# Patient Record
Sex: FEMALE | Race: WHITE | Employment: PART TIME | URBAN - METROPOLITAN AREA
[De-identification: names, ages, dates, MRNs, and addresses within clinical notes are randomized per-mention and may not be internally consistent; named-entity substitution may affect disease eponyms.]

---

## 2017-10-16 ENCOUNTER — OFFICE VISIT (OUTPATIENT)
Dept: INTERNAL MEDICINE CLINIC | Age: 32
End: 2017-10-16

## 2017-10-16 VITALS
DIASTOLIC BLOOD PRESSURE: 67 MMHG | HEIGHT: 67 IN | WEIGHT: 137 LBS | TEMPERATURE: 97.7 F | RESPIRATION RATE: 12 BRPM | SYSTOLIC BLOOD PRESSURE: 108 MMHG | BODY MASS INDEX: 21.5 KG/M2 | HEART RATE: 73 BPM

## 2017-10-16 DIAGNOSIS — B36.0 TINEA VERSICOLOR: ICD-10-CM

## 2017-10-16 DIAGNOSIS — Z00.00 WELL WOMAN EXAM (NO GYNECOLOGICAL EXAM): Primary | ICD-10-CM

## 2017-10-16 DIAGNOSIS — Z13.0 SCREENING, IRON DEFICIENCY ANEMIA: ICD-10-CM

## 2017-10-16 DIAGNOSIS — J30.2 ACUTE SEASONAL ALLERGIC RHINITIS DUE TO OTHER ALLERGEN: ICD-10-CM

## 2017-10-16 DIAGNOSIS — R53.83 FATIGUE, UNSPECIFIED TYPE: ICD-10-CM

## 2017-10-16 DIAGNOSIS — K64.9 HEMORRHOIDS, UNSPECIFIED HEMORRHOID TYPE: ICD-10-CM

## 2017-10-16 RX ORDER — MINERAL OIL
ENEMA (ML) RECTAL
COMMUNITY
End: 2018-02-06

## 2017-10-16 RX ORDER — HYDROCORTISONE ACETATE 25 MG/1
25 SUPPOSITORY RECTAL 2 TIMES DAILY
Qty: 14 SUPPOSITORY | Refills: 2
Start: 2017-10-16 | End: 2017-10-16 | Stop reason: SDUPTHER

## 2017-10-16 RX ORDER — KETOCONAZOLE 20 MG/G
CREAM TOPICAL 2 TIMES DAILY
Qty: 30 G | Refills: 1 | Status: SHIPPED | OUTPATIENT
Start: 2017-10-16 | End: 2017-11-06

## 2017-10-16 NOTE — PROGRESS NOTES
Presents for a PE. She will make an appt with Dr Yamileth Hanna for a pap. She has white spots on her body.

## 2017-10-16 NOTE — MR AVS SNAPSHOT
Visit Information Date & Time Provider Department Dept. Phone Encounter #  
 10/16/2017  2:45 PM Darlene Guo MD [facility] [address] [id number] Upcoming Health Maintenance Date Due  
 PAP AKA CERVICAL CYTOLOGY 3/1/2017 DTaP/Tdap/Td series (3 - Td) 4/16/2025 Allergies as of 10/16/2017  Review Complete On: 10/16/2017 By: Darlene Guo MD  
  
 Severity Noted Reaction Type Reactions Ceftin [Cefuroxime Axetil]  11/30/2010    Hives Gardasil [H Papillomavirus Vac,qval (Pf)]  09/07/2012    Other (comments)  
 syncope Current Immunizations  Reviewed on 10/16/2017 Name Date Hep A Vaccine (Adult) 1/30/2014 Human Papillomavirus 1/1/2006 Influenza Vaccine 8/19/2017, 9/16/2014, 8/24/2013 TDAP Vaccine 9/7/2012 Tdap 4/16/2015 Reviewed by Darlene Guo MD on 10/16/2017 at  3:29 PM  
 Reviewed by Darlene Guo MD on 10/16/2017 at  3:29 PM  
You Were Diagnosed With   
  
 Codes Comments Well woman exam (no gynecological exam)    -  Primary ICD-10-CM: Z00.00 ICD-9-CM: V70.0 Tinea versicolor     ICD-10-CM: B36.0 ICD-9-CM: 111.0 Acute seasonal allergic rhinitis due to other allergen     ICD-10-CM: J30.2 ICD-9-CM: 477.8 Fatigue, unspecified type     ICD-10-CM: R53.83 ICD-9-CM: 780.79 Screening, iron deficiency anemia     ICD-10-CM: Z13.0 ICD-9-CM: V78.0 Hemorrhoids, unspecified hemorrhoid type     ICD-10-CM: K64.9 ICD-9-CM: 337. 6 Vitals BP Pulse Temp Resp Height(growth percentile) Weight(growth percentile) 108/67 (BP 1 Location: Left arm, BP Patient Position: Sitting) 73 97.7 °F (36.5 °C) 12 5' 7\" (1.702 m) 137 lb (62.1 kg) Breastfeeding? BMI OB Status Smoking Status Yes 21.46 kg/m2 Having regular periods Never Smoker Vitals History BMI and BSA Data Body Mass Index Body Surface Area  
 21.46 kg/m 2 1.71 m 2 Preferred Pharmacy Pharmacy Name Phone [facility] [address], [facility] [address]  [facility] [phone] Your Updated Medication List  
  
   
This list is accurate as of: 10/16/17  3:47 PM.  Always use your most recent med list.  
  
  
  
  
 fexofenadine 180 mg tablet Commonly known as:  Mervat Gomezrenny Take  by mouth. hydrocortisone-pramoxine rectal foam  
Commonly known as:  PROCTOFOAM HC Insert 1 Applicator into rectum two (2) times a day.  
  
 ketoconazole 2 % topical cream  
Commonly known as:  NIZORAL Apply  to affected area two (2) times a day for 21 days. TAZORAC 0.05 % topical cream  
Generic drug:  tazorotene Apply  to affected area nightly. use thin film Prescriptions Sent to Pharmacy Refills  
 ketoconazole (NIZORAL) 2 % topical cream 1 Sig: Apply  to affected area two (2) times a day for 21 days. Class: Normal  
 Pharmacy: [facility] [address], [place] - [address] AT [facility] of [address]\"" Ph #: [phone] Route: Topical  
 hydrocortisone-pramoxine (PROCTOFOAM HC) rectal foam 0 Sig: Insert 1 Applicator into rectum two (2) times a day. Class: Normal  
 Pharmacy: [facility] [address], [place] - [address] AT [facility] of [address] (\[facility]\"" Ph #: [phone] Route: Rectal  
  
We Performed the Following CBC W/O DIFF [65391 CPT(R)] FERRITIN [69997 CPT(R)] IRON PROFILE [id number] CPT(R)] LIPID PANEL [58084 CPT(R)] METABOLIC PANEL, COMPREHENSIVE [48902 CPT(R)] TSH 3RD GENERATION [05458 CPT(R)] VITAMIN D, 25 HYDROXY [id number] CPT(R)] Introducing [facility] & HEALTH SERVICES! Dear Kirit Ewing: Thank you for requesting a [employer] account. Our records indicate that you already have an active [employer] account. You can access your account anytime at [email or web address] Did you know that you can access your hospital and ER discharge instructions at any time in ForceManager? You can also review all of your test results from your hospital stay or ER visit. Additional Information If you have questions, please visit the Frequently Asked Questions section of the ForceManager website at [email or web address] Remember, ForceManager is NOT to be used for urgent needs. For medical emergencies, dial 911. Now available from your iPhone and Android! Please provide this summary of care documentation to your next provider. Your primary care clinician is listed as Darlene Guo. If you have any questions after today's visit, please call [phone].

## 2017-10-17 NOTE — PROGRESS NOTES
Tony Emanuel is a 33 y.o. female  Presenting for her annual checkup and follow-up    She is doing well. Working at [employer] on the side. Has 3 yo child    C/o mild hemorrhoid pains at times. Tried otc hydrocortisone prn  Has hypopigmented spots of posterior right leg, mildly of antecubital fossae. Tried clotrimazole w/o relief    Last breast exam: none  Last PAP/pelvic: 2013? Last colonoscopy: none  Last DEXA:   Health Maintenance   Topic Date Due    PAP AKA CERVICAL CYTOLOGY  03/01/2017    DTaP/Tdap/Td series (3 - Td) 04/16/2025    INFLUENZA AGE 9 TO ADULT  Completed       Exercise: moderately active  Diet: generally follows a low fat low cholesterol diet    Vaccinations reviewed  Immunization History   Administered Date(s) Administered    Hep A Vaccine (Adult) 01/30/2014    Human Papillomavirus 01/01/2006    Influenza Vaccine 08/24/2013, 09/16/2014, 08/19/2017    TDAP Vaccine 09/07/2012    Tdap 04/16/2015       Allergies: Ceftin [cefuroxime axetil] and Gardasil [h papillomavirus vac,qval (pf)]  Current Outpatient Prescriptions   Medication Sig    tazorotene (TAZORAC) 0.05 % topical cream Apply  to affected area nightly. use thin film    fexofenadine (ALLEGRA) 180 mg tablet Take  by mouth.  ketoconazole (NIZORAL) 2 % topical cream Apply  to affected area two (2) times a day for 21 days.  hydrocortisone-pramoxine (PROCTOFOAM HC) rectal foam Insert 1 Applicator into rectum two (2) times a day. No current facility-administered medications for this visit. has a past medical history of Acne; Contusion of left knee (11/6/2013); Eczema (11/6/2013); GERD (gastroesophageal reflux disease) (1/27/2015); Migraine (11/6/2013); Right knee meniscal tear; Right wrist fracture (1997, 2000); Scoliosis; and Screening for cervical cancer (2/2010, 3/8/2012). She also has no past medical history of Abuse; Anemia NEC; Arrhythmia; Arthritis; Asthma;  Autoimmune disease ([facility] CAD (coronary artery disease); Calculus of kidney; Chlamydia; Chronic kidney disease; Chronic pain; Complication of anesthesia; Congestive heart failure, unspecified; COPD; Depression; Diabetes ([facility] Genital herpes, unspecified; Gonorrhea; Herpes gestationis; Herpes simplex without mention of complication; Human immunodeficiency virus (HIV) disease; Hypercholesterolemia; Hypertension; Infertility, female; Liver disease; Phlebitis and thrombophlebitis of unspecified site; Pituitary disorder ([facility] Polycystic disease, ovaries; Postpartum depression; Psychotic disorder; PUD (peptic ulcer disease); Rhesus isoimmunization unspecified as to episode of care in pregnancy; Seizures ([facility] Sickle-cell disease, unspecified; Stroke ([facility] Syphilis; Systemic lupus erythematosus ([facility] Thromboembolus ([facility] Thyroid disease; Trauma; Unspecified breast disorder; or Unspecified epilepsy without mention of intractable epilepsy. History reviewed. No pertinent surgical history.    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 [employer]     Social History Main Topics    Smoking status: Never Smoker    Smokeless tobacco: Never Used    Alcohol use 0.0 oz/week     1 - 2 Glasses of wine per week    Drug use: No    Sexual activity: Not Currently     Partners: Male     Birth control/ protection: None     Other Topics Concern    Not on file     Social History Narrative     Family History   Problem Relation Age of Onset    Hypertension Mother     Hypertension Father     High Cholesterol Father     Asthma Sister     Cancer Maternal Grandmother      colon    Diabetes Paternal Grandfather      type 2    Heart Attack Paternal Grandfather      fatal       Review of Systems - History obtained from the patient  General ROS: negative for - night sweats, weight gain or weight loss  Cardiovascular ROS: no chest pain, dyspnea on exertion, edema  GYN ROS: normal menses, no abnormal bleeding, pelvic pain or discharge, no breast pain or new or enlarging lumps on self exam.    Physical exam  Blood pressure 108/67, pulse 73, temperature 97.7 °F (36.5 °C), resp. rate 12, height 5' 7\" (1.702 m), weight 137 lb (62.1 kg), currently breastfeeding. Wt Readings from Last 3 Encounters:   10/16/17 137 lb (62.1 kg)   11/30/16 140 lb 9.6 oz (63.8 kg)   08/11/15 165 lb 6.4 oz (75 kg)     she appears well, alert and oriented x 3, pleasant and cooperative. Vitals as noted. No rashes or significant lesions. Neck supple and free of adenopathy, or masses. No thyromegaly or carotid bruits. Cranial nerves normal. Lungs are clear to auscultation. Heart sounds are normal with no murmurs, clicks, gallops or rubs. Abdomen is soft, non- tender, with no masses or organomegaly. Extremities, peripheral pulses and reflexes are normal.  .   Skin hypopigmented patches (2-3 cm on legs, arms  PELVIC EXAM: examination not indicated      Diagnoses and all orders for this visit:    1. Well woman exam (no gynecological exam)  -     LIPID PANEL  -     METABOLIC PANEL, COMPREHENSIVE  -     CBC W/O DIFF    2. Tinea versicolor  - mild. Try cream.  Contact me if not improving and can consider oral therapy  -     ketoconazole (NIZORAL) 2 % topical cream; Apply  to affected area two (2) times a day for 21 days. 3. Acute seasonal allergic rhinitis due to other allergen - trial of nasal corticosteroid OTC    4. Fatigue, unspecified type - mild. She is working, mother of 3 yo. Check labs  -     TSH 3RD GENERATION  -     IRON PROFILE  -     FERRITIN  -     VITAMIN D, 25 HYDROXY    5. Screening, iron deficiency anemia    6. Hemorrhoids, unspecified hemorrhoid type  -     Try hydrocortisone-pramoxine (PROCTOFOAM HC) rectal foam; Insert 1 Applicator into rectum two (2) times a day.         The patient is asked to make an attempt to improve diet and exercise patterns    Return for yearly wellness visits

## 2018-02-05 ENCOUNTER — TELEPHONE (OUTPATIENT)
Dept: OBGYN CLINIC | Age: 33
End: 2018-02-05

## 2018-02-05 NOTE — TELEPHONE ENCOUNTER
Patient advise of MD recommendations and was placed on the schedule to be seen on 2/13/18 at 8:40am.      Earlier appointment offer which did not suit patient. Patient advised to try unisom and vit b 6  3-4 times a day, sips of fluids, small frequent meals and to try brady. Patient verbalized understanding.

## 2018-02-05 NOTE — TELEPHONE ENCOUNTER
Patient is coming in for first OB visit on 2/21/18 but patient is struggling with nausea from pregnancy. Patient has a history of hyperemesis with her former pregnancy. Please advise. Please forward to [facility] out of office this PM)    [facility].

## 2018-02-06 ENCOUNTER — OFFICE VISIT (OUTPATIENT)
Dept: OBGYN CLINIC | Age: 33
End: 2018-02-06

## 2018-02-06 VITALS — WEIGHT: 134.8 LBS | BODY MASS INDEX: 21.11 KG/M2 | DIASTOLIC BLOOD PRESSURE: 78 MMHG | SYSTOLIC BLOOD PRESSURE: 118 MMHG

## 2018-02-06 DIAGNOSIS — R11.2 NAUSEA AND VOMITING, INTRACTABILITY OF VOMITING NOT SPECIFIED, UNSPECIFIED VOMITING TYPE: Primary | ICD-10-CM

## 2018-02-06 RX ORDER — DOXYLAMINE SUCCINATE AND PYRIDOXINE HYDROCHLORIDE, DELAYED RELEASE TABLETS 10 MG/10 MG 10; 10 MG/1; MG/1
1 TABLET, DELAYED RELEASE ORAL
Qty: 100 TAB | Refills: 0 | Status: SHIPPED | OUTPATIENT
Start: 2018-02-06 | End: 2018-02-21 | Stop reason: SDUPTHER

## 2018-02-06 RX ORDER — DOXYLAMINE SUCCINATE AND PYRIDOXINE HYDROCHLORIDE, DELAYED RELEASE TABLETS 10 MG/10 MG 10; 10 MG/1; MG/1
2 TABLET, DELAYED RELEASE ORAL
Qty: 120 TAB | Refills: 0 | Status: SHIPPED | OUTPATIENT
Start: 2018-02-06 | End: 2018-03-08

## 2018-02-06 RX ORDER — ONDANSETRON 4 MG/1
4 TABLET, ORALLY DISINTEGRATING ORAL
Qty: 30 TAB | Refills: 1 | Status: SHIPPED | OUTPATIENT
Start: 2018-02-06 | End: 2018-03-29

## 2018-02-06 NOTE — MR AVS SNAPSHOT
[address] [facility] [address] [place] [place] [address] 
[phone] Patient: Luis Pat MRN: [id number] DAVIE:[birth year] Visit Information Date & Time Provider Department Dept. Phone Encounter #  
 2/6/2018  1:40 PM MD Brodie Tejada [phone] [id number] Your Appointments 2/13/2018  8:40 AM  
ESTABLISHED PATIENT with MD Brodie Tejada ([address]) Appt Note: problem visit NV  lmp 12/24/17  
 [address] [facility] [address] [place] [address]  
[phone]  
  
   
 [address] [address] [place] [address]  
  
    
 2/21/2018  1:15 PM  
ULTRASOUND with Sagrario Pineda Brodie Lutz ([address]) Appt Note: NOB-lmp 12/24/17 +upt u/s + TP  
 [address] [address]  
[phone]  
  
   
 [address] [address]  
  
    
 2/21/2018  1:50 PM  
ESTABLISHED PATIENT with MD Brodie Tejada ([address]) Appt Note: NOB-lmp 12/24/17 +upt u/s + TP; NOB-lmp 12/24/17 +upt u/s + TP  
 [address] [address]  
[phone] Upcoming Health Maintenance Date Due  
 PAP AKA CERVICAL CYTOLOGY 3/1/2017 Allergies as of 2/6/2018  Review Complete On: 10/16/2017 By: Tiffanie Morrissey MD  
  
 Severity Noted Reaction Type Reactions Ceftin [Cefuroxime Axetil]  11/30/2010    Hives Gardasil [H Papillomavirus Vac,qval (Pf)]  09/07/2012    Other (comments)  
 syncope Current Immunizations  Reviewed on 10/16/2017 Name Date Hep A Vaccine (Adult) 1/30/2014 Human Papillomavirus 1/1/2006 Influenza Vaccine 8/19/2017, 9/16/2014, 8/24/2013 TDAP Vaccine 9/7/2012 Tdap 4/16/2015 Not reviewed this visit Vitals BP Weight(growth percentile) LMP Breastfeeding? BMI OB Status 118/78 134 lb 12.8 oz (61.1 kg) 12/24/2017 (Exact Date) No 21.11 kg/m2 Pregnant Smoking Status Never Smoker BMI and BSA Data Body Mass Index Body Surface Area  
 21.11 kg/m 2 1.7 m 2 Preferred Pharmacy Pharmacy Name Phone [facility] [address]  [facility] [phone] Your Updated Medication List  
  
   
This list is accurate as of: 2/6/18  1:57 PM.  Always use your most recent med list.  
  
  
  
  
 fexofenadine 180 mg tablet Commonly known as:  Kristel Riding Take  by mouth. hydrocortisone-pramoxine rectal foam  
Commonly known as:  PROCTOFOAM HC Insert 1 Applicator into rectum two (2) times a day. TAZORAC 0.05 % topical cream  
Generic drug:  tazorotene Apply  to affected area nightly. use thin film Patient Instructions Nausea and Vomiting: Care Instructions Your Care Instructions When you are nauseated, you may feel weak and sweaty and notice a lot of saliva in your mouth. Nausea often leads to vomiting. Most of the time you do not need to worry about nausea and vomiting, but they can be signs of other illnesses. Two common causes of nausea and vomiting are stomach flu and food poisoning. Nausea and vomiting from viral stomach flu will usually start to improve within 24 hours. Nausea and vomiting from food poisoning may last from 12 to 48 hours. The doctor has checked you carefully, but problems can develop later. If you notice any problems or new symptoms, get medical treatment right away. Follow-up care is a key part of your treatment and safety. Be sure to make and go to all appointments, and call your doctor if you are having problems. It's also a good idea to know your test results and keep a list of the medicines you take. How can you care for yourself at home?  
· To prevent dehydration, drink plenty of fluids, enough so that your urine is light yellow or clear like water. Choose water and other caffeine-free clear liquids until you feel better. If you have kidney, heart, or liver disease and have to limit fluids, talk with your doctor before you increase the amount of fluids you drink. · Rest in bed until you feel better. · When you are able to eat, try clear soups, mild foods, and liquids until all symptoms are gone for 12 to 48 hours. Other good choices include dry toast, crackers, cooked cereal, and gelatin dessert, such as Jell-O. When should you call for help? Call 911 anytime you think you may need emergency care. For example, call if: 
? · You passed out (lost consciousness). ?Call your doctor now or seek immediate medical care if: 
? · You have symptoms of dehydration, such as: ¨ Dry eyes and a dry mouth. ¨ Passing only a little dark urine. ¨ Feeling thirstier than usual.  
? · You have new or worsening belly pain. ? · You have a new or higher fever. ? · You vomit blood or what looks like coffee grounds. ? Watch closely for changes in your health, and be sure to contact your doctor if: 
? · You have ongoing nausea and vomiting. ? · Your vomiting is getting worse. ? · Your vomiting lasts longer than 2 days. ? · You are not getting better as expected. Where can you learn more? Go to http://remi-kannan.info/. Enter 25 [id number] in the search box to learn more about \"Nausea and Vomiting: Care Instructions. \" Current as of: March 20, 2017 Content Version: 11.4 © 5582-4274 [employer]. Care instructions adapted under license by [employer] (which disclaims liability or warranty for this information). If you have questions about a medical condition or this instruction, always ask your healthcare professional. [place] [address] any warranty or liability for your use of this information. Introducing [facility] & HEALTH SERVICES! Dear Yazmin Serrano: Thank you for requesting a [employer] account. Our records indicate that you already have an active [employer] account. You can access your account anytime at [email or web address] Did you know that you can access your hospital and ER discharge instructions at any time in Socii? You can also review all of your test results from your hospital stay or ER visit. Additional Information If you have questions, please visit the Frequently Asked Questions section of the Soci[employer] website at [email or web address] Remember, Socii is NOT to be used for urgent needs. For medical emergencies, dial 911. Now available from your iPhone and Android! Please provide this summary of care documentation to your next provider. Your primary care clinician is listed as Alessio Luevano. If you have any questions after today's visit, please call [phone].

## 2018-02-06 NOTE — PATIENT INSTRUCTIONS
Nausea and Vomiting: Care Instructions  Your Care Instructions    When you are nauseated, you may feel weak and sweaty and notice a lot of saliva in your mouth. Nausea often leads to vomiting. Most of the time you do not need to worry about nausea and vomiting, but they can be signs of other illnesses. Two common causes of nausea and vomiting are stomach flu and food poisoning. Nausea and vomiting from viral stomach flu will usually start to improve within 24 hours. Nausea and vomiting from food poisoning may last from 12 to 48 hours. The doctor has checked you carefully, but problems can develop later. If you notice any problems or new symptoms, get medical treatment right away. Follow-up care is a key part of your treatment and safety. Be sure to make and go to all appointments, and call your doctor if you are having problems. It's also a good idea to know your test results and keep a list of the medicines you take. How can you care for yourself at home? · To prevent dehydration, drink plenty of fluids, enough so that your urine is light yellow or clear like water. Choose water and other caffeine-free clear liquids until you feel better. If you have kidney, heart, or liver disease and have to limit fluids, talk with your doctor before you increase the amount of fluids you drink. · Rest in bed until you feel better. · When you are able to eat, try clear soups, mild foods, and liquids until all symptoms are gone for 12 to 48 hours. Other good choices include dry toast, crackers, cooked cereal, and gelatin dessert, such as Jell-O. When should you call for help? Call 911 anytime you think you may need emergency care. For example, call if:  ? · You passed out (lost consciousness). ?Call your doctor now or seek immediate medical care if:  ? · You have symptoms of dehydration, such as:  ¨ Dry eyes and a dry mouth. ¨ Passing only a little dark urine.   ¨ Feeling thirstier than usual.   ? · You have new or worsening belly pain. ? · You have a new or higher fever. ? · You vomit blood or what looks like coffee grounds. ? Watch closely for changes in your health, and be sure to contact your doctor if:  ? · You have ongoing nausea and vomiting. ? · Your vomiting is getting worse. ? · Your vomiting lasts longer than 2 days. ? · You are not getting better as expected. Where can you learn more? Go to http://remi-kannan.info/. Enter 25 532451 in the search box to learn more about \"Nausea and Vomiting: Care Instructions. \"  Current as of: March 20, 2017  Content Version: 11.4  © 4290-4901 [employer]. Care instructions adapted under license by [employer] (which disclaims liability or warranty for this information). If you have questions about a medical condition or this instruction, always ask your healthcare professional. [address] any warranty or liability for your use of this information.

## 2018-02-06 NOTE — PROGRESS NOTES
[facility]  [email or web address]  [phone]    Salo Lozada MD, FACOG       OB/GYN Problem visit    Chief Complaint:   Chief Complaint   Patient presents with    Irregular Menses       History of Present Illness: This is a new problem being evaluated by this provider. The patient is a 33 y.o.  female who reports having recent positive pregnancy test. Scheduled for EOB 18. C/o nausea and vomiting. She reports the symptoms are is moderately worse. Aggravating factors include foods, fluids. Alleviating factors include none. Tried b6, doxylamine. She does not have other concerns. LMP: Patient's last menstrual period was 2017 (exact date). [address] [place] [facility] [address]:  Past Medical History:   Diagnosis Date    Acne     Contusion of left knee 2013    Eczema 2013    GERD (gastroesophageal reflux disease) 2015    Migraine 2013    Right knee meniscal tear     resolved w physical therapy. BBall injury    Right wrist fracture , 2000    x2    Scoliosis     mild    Screening for cervical cancer 2010, 3/8/2012    normal. Dr. Vidal Aguayo     No past surgical history on file. Family History   Problem Relation Age of Onset    Hypertension Mother     Hypertension Father     High Cholesterol Father     Asthma Sister     Cancer Maternal Grandmother      colon    Diabetes Paternal Grandfather      type 2    Heart Attack Paternal Grandfather      fatal     Social History   Substance Use Topics    Smoking status: Never Smoker    Smokeless tobacco: Never Used    Alcohol use 0.0 oz/week     1 - 2 Glasses of wine per week     Allergies   Allergen Reactions    Ceftin [Cefuroxime Axetil] Hives    Gardasil [H Papillomavirus Vac,Qval (Pf)] Other (comments)     syncope     Current Outpatient Prescriptions   Medication Sig    ondansetron (ZOFRAN ODT) 4 mg disintegrating tablet Take 1 Tab by mouth every eight (8) hours as needed for Nausea.     doxylamine-pyridoxine, vit B6, (DICLEGIS) 10-10 mg TbEC DR tablet Take 1 Tab by mouth nightly. 2 tabs po qhs, add one in am after 3d prn, add one in pm after 6d prn. Max 4/day     No current facility-administered medications for this visit. Review of Systems:  History obtained from the patient  Constitutional: negative for fevers, chills and weight loss  ENT ROS: negative for - hearing change, oral lesions or visual changes  Respiratory: negative for cough, wheezing or dyspnea on exertion  Cardiovascular: negative for chest pain, irregular heart beats, exertional chest pressure/discomfort  Gastrointestinal: negative for dysphagia, nausea and vomiting  Genito-Urinary ROS:  see HPI  Inteument/breast: negative for rash, breast lump and nipple discharge  Musculoskeletal:negative for stiff joints, neck pain and muscle weakness  Endocrine ROS: negative for - breast changes, galactorrhea or temperature intolerance  Hematological and Lymphatic ROS: negative for - blood clots, bruising or swollen lymph nodes    Physical Exam:  Visit Vitals    /78    Wt 134 lb 12.8 oz (61.1 kg)    Breastfeeding No    BMI 21.11 kg/m2       GENERAL: alert, well appearing, and in no distress  HEAD: normocephalic, atraumatic. NEURO: alert, oriented, normal speech    Assessment:  Encounter Diagnoses   Name Primary?  Nausea and vomiting, intractability of vomiting not specified, unspecified vomiting type Yes       Plan:  The patient is advised that she should contact the office if she does not note improvement or if symptoms recur  Recommend follow up with PCP for non-gynecologic complaints and chronic medical problems. She should contact our office with any questions or concerns  She could keep her routine annual exam appointment. N/v h/o  Disc options for n/v  Start with diclegis, add zofran prn  Bowel regimen  If NI in 7d.  Consider adding phenergan, pt will contact office  Small freq meals, po hydration  Dehydration precautions  I spent 12 minutes of face to face time counseling and discussing n/v, txt options, med rba  with the patient. More than 50 % of her visit was spent performing counseling. Orders Placed This Encounter    ondansetron (ZOFRAN ODT) 4 mg disintegrating tablet    doxylamine-pyridoxine, vit B6, (DICLEGIS) 10-10 mg TbEC DR tablet       No results found for this visit on 02/06/18.

## 2018-02-12 ENCOUNTER — PATIENT MESSAGE (OUTPATIENT)
Dept: OBGYN CLINIC | Age: 33
End: 2018-02-12

## 2018-02-12 RX ORDER — AZELAIC ACID 0.15 G/G
GEL TOPICAL 2 TIMES DAILY
Qty: 50 G | Refills: 5 | Status: SHIPPED | OUTPATIENT
Start: 2018-02-12 | End: 2018-03-29

## 2018-02-12 RX ORDER — ONDANSETRON 8 MG/1
8 TABLET, ORALLY DISINTEGRATING ORAL
Qty: 30 TAB | Refills: 1 | Status: SHIPPED | OUTPATIENT
Start: 2018-02-12 | End: 2018-03-29 | Stop reason: SDUPTHER

## 2018-02-12 NOTE — TELEPHONE ENCOUNTER
From: Pretty Koo  To: Silvino Roper MD  Sent: 2/12/2018 7:43 AM EST  Subject: Prescription Question    My insurance limited me to 15 of the ondansetron 4 mg ODTs per 30 days. I am trying my best to space this out however I have been extremely nauseated. Are you able to send in a prescription for 8mg ODTs? Because of the change in dose my insurance will cover it. Thank you!

## 2018-02-21 ENCOUNTER — OFFICE VISIT (OUTPATIENT)
Dept: OBGYN CLINIC | Age: 33
End: 2018-02-21

## 2018-02-21 VITALS — SYSTOLIC BLOOD PRESSURE: 104 MMHG | BODY MASS INDEX: 21.77 KG/M2 | DIASTOLIC BLOOD PRESSURE: 62 MMHG | WEIGHT: 139 LBS

## 2018-02-21 DIAGNOSIS — Z34.80 PRENATAL CARE OF MULTIGRAVIDA, ANTEPARTUM: Primary | ICD-10-CM

## 2018-02-21 DIAGNOSIS — R11.2 NAUSEA AND VOMITING, INTRACTABILITY OF VOMITING NOT SPECIFIED, UNSPECIFIED VOMITING TYPE: ICD-10-CM

## 2018-02-21 LAB
ANTIBODY SCREEN, EXTERNAL: NORMAL
CHLAMYDIA, EXTERNAL: NORMAL
HBSAG, EXTERNAL: NORMAL
HCT, EXTERNAL: 39.6
HGB EVAL, EXTERNAL: NORMAL
HGB, EXTERNAL: 13.2
HIV, EXTERNAL: NORMAL
N. GONORRHEA, EXTERNAL: NORMAL
PAP SMEAR, EXTERNAL: NORMAL
PLATELET CNT,   EXTERNAL: 176
RUBELLA, EXTERNAL: NORMAL
T. PALLIDUM, EXTERNAL: NORMAL
TYPE, ABO & RH, EXTERNAL: NORMAL
URINALYSIS, EXTERNAL: NORMAL

## 2018-02-21 NOTE — PROGRESS NOTES
[facility]  [email or web address]  [phone]    Clara Felder MD, [address] [facility]     Chief complaint:  Irregular cycles  Last cycle; Patient's last menstrual period was 2017 (exact date). This is a new concern and an evaluation is planned. Current pregnancy history:  Rangel Guerra is a , 33 y.o. female [facility]   She presents for the evaluation of irregular menses and a positive pregnancy test.    LMP history:  Patient's last menstrual period was 2017 (exact date). .  The date of the beginning of her last menstrual period is certain. Her menses are regular. Her cycles occur about every 4 weeks. A urine pregnancy test was positive about 4 weeks ago. She was not using contraception at the estimated time of conception. Based on her LMP, her EGA is 8 weeks,3 days with and EDC of 18. Ultrasound data:  She had an ultrasound today which revealed a viable meek pregnancy with a gestational age of 11 weeks and 2 days giving an [address] of 10/1/18. Pregnancy symptoms:  She reports nausea and vomiting (improved). She denies urinary frequency, breast tenderness, pelvic pain, vaginal bleeding. Since she found out she is pregnant, she has gained, 5 lbs. She reports her prepregnancy weight as 134 pounds. Relevant past pregnancy history:  She has the following pregnancy history: postpartum hemorrhage   Previous CF testing negative. She does not have a history of  delivery. She does not have a history of a prior  section. Relevant past medical history:(relevant to this pregnancy):   none     Pap smear history:  Last pap smear: 2010  Results: within normal limits    Occupational history  Her occupation is: full time job, pharmacist.    Substance history:   She does not report current tobacco use. She does not report current alcohol use. She does not report current drug use. Exposure history:    There are not indoor cat(s) in the home. The patient was instructed not to change cat litter boxes during pregnancy. She does report close contact with children on a regular basis. She has chicken pox or the vaccine in the past.   Patient does not report issues with domestic violence. Genetic Screening/Teratology Counseling:   (Includes patient, baby's father, or anyone in either family with:)  3.  Patient's age >/= 28 at EDC?--32      FOB age: 29years old. 2.  Thalassemia ([facility], [facility], [address], or  background): MCV<80?--yes and [facility] FOB  3.  Neural tube defect (meningomyelocele, spina bifida, anencephaly)? --no  4. Congenital heart defect?--no  5. Down syndrome?--no  6. Bradley-Sachs ([address] [place] [address])? --no  7. Canavan's Disease?--no  8. Familial Dysautonomia?--no   9. Sickle cell disease or trait ()? --no   Has she been tested for sickle trait: No  10. Hemophilia or other blood disorders?--no  11. Muscular dystrophy?--no  12. Cystic fibrosis? --no  13. [place]'s Chorea?--no  14. Mental retardation/autism (if yes was person tested for Fragile X)?-no  15. Other inherited genetic or chromosomal disorder?- no  16. Maternal metabolic disorder (DM, PKU, etc)? --no  17. Patient or FOB with a child with a birth defect not listed above?--no  17a. Patient or FOB with a birth defect themselves?--no  25. Recurrent pregnancy loss, or stillbirth?--no  19. Any medications since LMP other than prenatal vitamins (include vitamins, supplements, OTC meds, drugs, alcohol)? -- PNV, diclegis, zofran  20. Any other genetic/environmental exposure to discuss?--no. Infection History:  1. Lives with someone with TB or TB exposed?--no  2. Patient or partner has history of genital herpes?--no  3. Rash or viral illness since LMP?--no  4. History of STD (GC, CT, HPV, syphilis, HIV)? --no  5. Have you received a flu vaccine for the most recent flu season? -- yes  6.   Have you or your sexual partner(s) travelled to a [facility] area in the last 6 months? -- no    Past Medical History:   Diagnosis Date    Acne     Contusion of left knee 2013    Eczema 2013    GERD (gastroesophageal reflux disease) 2015    Migraine 2013    Right knee meniscal tear     resolved w physical therapy. BBall injury    Right wrist fracture , 2000    x2    Scoliosis     mild    Screening for cervical cancer 2010, 3/8/2012    normal. Dr. Les Chavez     No past surgical history on file. 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[facility] [employer]     Social History Main Topics    Smoking status: Never Smoker    Smokeless tobacco: Never Used    Alcohol use 0.0 oz/week     1 - 2 Glasses of wine per week    Drug use: No    Sexual activity: Not Currently     Partners: Male     Birth control/ protection: None     Family History   Problem Relation Age of Onset    Hypertension Mother     Hypertension Father     High Cholesterol Father     Asthma Sister     Cancer Maternal Grandmother      colon    Diabetes Paternal Grandfather      type 2    Heart Attack Paternal Grandfather      fatal     OB History    Para Term  AB Living   2 1 1 0 0 1   SAB TAB Ectopic Molar Multiple Live Births   0 0 0  1 1      # Outcome Date GA Lbr Faraz/2nd Weight Sex Delivery Anes PTL Lv   2 Current            1A Term  39w6d   F   N    1B Term 06/24/15 40w5d  7 lb 10.4 oz (3.47 kg) F VAGINAL DELI CSE N [place]        Allergies   Allergen Reactions    Ceftin [Cefuroxime Axetil] Hives    Gardasil [H Papillomavirus Vac,Qval (Pf)] Other (comments)     syncope     Prior to Admission medications    Medication Sig Start Date End Date Taking? Authorizing Provider   ondansetron (ZOFRAN ODT) 8 mg disintegrating tablet Take 1 Tab by mouth every eight (8) hours as needed for Nausea.  18  Yes Silvino Roper MD   azelaic acid (FINACEA) 15 % topical gel Apply  to affected area two (2) times a day. 2/12/18  Yes Mendy Donohue MD   doxylamine-pyridoxine, vit B6, (DICLEGIS) 10-10 mg TbEC DR tablet Take 2 Tabs by mouth nightly for 30 days. May add one in AM and one in the afternoon if needed. Max of 4 per day. 2/6/18 3/8/18 Yes Keerthi Meza MD   ondansetron (ZOFRAN ODT) 4 mg disintegrating tablet Take 1 Tab by mouth every eight (8) hours as needed for Nausea.  2/6/18   Keerthi Meza MD        Review of Systems - History obtained from the patient  Constitutional: negative for weight loss, fever, night sweats  HEENT: negative for hearing loss, earache, congestion, snoring, sorethroat  CV: negative for chest pain, palpitations, edema  Resp: negative for cough, shortness of breath, wheezing  GI: negative for change in bowel habits, abdominal pain, black or bloody stools  : negative for frequency, dysuria, hematuria, vaginal discharge  MSK: negative for back pain, joint pain, muscle pain  Breast: negative for breast lumps, nipple discharge, galactorrhea  Skin :negative for itching, rash, hives  Neuro: negative for dizziness, headache, confusion, weakness  Psych: negative for anxiety, depression, change in mood  Heme/lymph: negative for bleeding, bruising, pallor    Objective:  Visit Vitals    /62    Wt 139 lb (63 kg)    LMP 12/24/2017 (Exact Date)    BMI 21.77 kg/m2       Physical Exam:   Constitutional  · Appearance: well-nourished, well developed, alert, in no acute distress    HENT  · Head  · Face: appears normal  · Eyes: appear normal  · Ears: normal  · Mouth: normal  · Lips: no lesions    Neck  · Inspection/Palpation: normal appearance, no masses or tenderness  · Lymph Nodes: no lymphadenopathy present  · Thyroid: gland size normal, nontender, no nodules or masses present on palpation    Chest  · Respiratory Effort: breathing unlabored  · Auscultation: normal breath sounds    Cardiovascular  · Heart:  · Auscultation: regular rate and rhythm without murmur    Breasts  · Inspection of Breasts: breasts symmetrical, no skin changes, no discharge present, nipple appearance normal, no skin retraction present  · Palpation of Breasts and Axillae: no masses present on palpation, no breast tenderness  · Axillary Lymph Nodes: no lymphadenopathy present    Gastrointestinal  · Abdominal Examination: abdomen non-tender to palpation, normal bowel sounds, no masses present  · Liver and spleen: no hepatomegaly present, spleen not palpable  · Hernias: no hernias identified    Genitourinary  · External Genitalia: normal appearance for age, no discharge present, no tenderness present, no inflammatory lesions present, no masses present, no atrophy present  · Vagina: normal vaginal vault without central or paravaginal defects, no discharge present, no inflammatory lesions present, no masses present  · Bladder: non-tender to palpation  · Urethra: appears normal  · Cervix: normal appearing with discharge or lesions, os closed  · Uterus: enlarged, normal shape, soft  · Adnexa: no adnexal tenderness present, no adnexal masses present  · Perineum: perineum within normal limits, no evidence of trauma, no rashes or skin lesions present  · Anus: anus within normal limits, no hemorrhoids present  · Inguinal Lymph Nodes: no lymphadenopathy present    Skin  · General Inspection: no rash, no lesions identified    Neurologic/Psychiatric  · Mental Status:  · Orientation: grossly oriented to person, place and time  · Mood and Affect: mood normal, affect appropriate    Assessment:   Irregular cycles  Encounter Diagnoses   Name Primary?     Prenatal care of multigravida, antepartum Yes    Nausea and vomiting, intractability of vomiting not specified, unspecified vomiting type      Due date: LMP    Plan:   We discussed options of genetic screening and diagnostic testing including:  CF testing, CVS, amniocentesis first trimester screening/NT, MSAFP, and NIPT (handout given to patient for review and consent)  She is not interested in prenatal genetic testing of her fetus. Plan: 20 wk US  The course of pregnancy discussed including visit schedule, ultrasounds, lab testing, etc.  Pt advised to avoid alcoholic beverages and illicit/recreational drugs use  Recommend taking prenatal vitamins or folic acid daily with DHA/fish oil. The hospital and practice style discussed with coverage system. We discussed nutrition, toxoplasmosis precautions, sexual activity, exercise, need for influenza vaccine, environmental and work hazards, travel advice, screen for domestic violence, need for seat belts. We discussed seafood, unpasteurized dairy products, deli meat, artificial sweeteners, and caffeine intake. We recommend avoiding chemical and toxin exposures when possible. Information on prenatal and breastfeeding classes given. Information on circumcision given  Patient encouraged not to smoke. Discussed current prescription drug use. Given medication list.  Discussed the use of over the counter medications and chemicals. She is advised to contact her MD with any questions. Pt understands risk of hemorrhage during pregnancy and post delivery and would accept blood products if necessary in life-threatening emergencies  We discussed signs and symptoms of abnormal pregnancies and miscarriage. Handouts given to pt. Bowel regimen prn     Physician review of ultrasound performed by technician  Today's ultrasound report and images were reviewed and discussed with the patient. Please see images and imaging report entered by technician in PACS for more detail and progress note and diagnosis entered by MD.    Rashmi Wiley MD    Orders Placed This Encounter    CULTURE, URINE    HEP B SURFACE AG    HIV SCREEN, [address] [place] [address].  W/REFLEX CONFIRM    CBC W/O DIFF    RUBELLA AB, IGG    T PALLIDUM SCREEN W/REFLEX    SMN1 COPY NUMBER ANALYSIS    HEMOGLOBIN FRACTIONATION    TYPE, ABO & RH    ANTIBODY SCREEN    PAP IG, CT-NG, HPV [id number], [id number])     Follow-up Disposition: Not on File

## 2018-02-21 NOTE — MR AVS SNAPSHOT
900 [facility] [address] [place] [address] 
[phone] Patient: Laure Jenkins MRN: [id number] QLE:[id number] Visit Information Date & Time Provider Department Dept. Phone Encounter #  
 2/21/2018  1:50 PM Jung Bates MD Brodie Lutz [phone] [id number] Upcoming Health Maintenance Date Due  
 PAP AKA CERVICAL CYTOLOGY 3/1/2017 Allergies as of 2/21/2018  Review Complete On: 2/21/2018 By: Elsy Guzman LPN Severity Noted Reaction Type Reactions Ceftin [Cefuroxime Axetil]  11/30/2010    Hives Gardasil [H Papillomavirus Vac,qval (Pf)]  09/07/2012    Other (comments)  
 syncope Current Immunizations  Reviewed on 10/16/2017 Name Date Hep A Vaccine (Adult) 1/30/2014 Human Papillomavirus 1/1/2006 Influenza Vaccine 8/19/2017, 9/16/2014, 8/24/2013 TDAP Vaccine 9/7/2012 Tdap 4/16/2015 Not reviewed this visit You Were Diagnosed With   
  
 Codes Comments Prenatal care of multigravida, antepartum    -  Primary ICD-10-CM: Z34.80 ICD-9-CM: V22.1 Vitals BP Weight(growth percentile) LMP BMI OB Status Smoking Status 104/62 139 lb (63 kg) 12/24/2017 (Exact Date) 21.77 kg/m2 Pregnant Never Smoker BMI and BSA Data Body Mass Index Body Surface Area 21.77 kg/m 2 1.73 m 2 Preferred Pharmacy Pharmacy Name Phone [facility] 1 [address] AT 2 [facility] [phone] Your Updated Medication List  
  
   
This list is accurate as of 2/21/18  1:55 PM.  Always use your most recent med list.  
  
  
  
  
 azelaic acid 15 % topical gel Commonly known as:  Klip Apply  to affected area two (2) times a day. doxylamine-pyridoxine (vit B6) 10-10 mg Tbec DR tablet Commonly known as:  William Belcher Take 2 Tabs by mouth nightly for 30 days.  May add one in AM and one in the afternoon if needed. Max of 4 per day. * ondansetron 4 mg disintegrating tablet Commonly known as:  ZOFRAN ODT Take 1 Tab by mouth every eight (8) hours as needed for Nausea. * ondansetron 8 mg disintegrating tablet Commonly known as:  ZOFRAN ODT Take 1 Tab by mouth every eight (8) hours as needed for Nausea. * Notice: This list has 2 medication(s) that are the same as other medications prescribed for you. Read the directions carefully, and ask your doctor or other care provider to review them with you. We Performed the Following ANTIBODY SCREEN [id number] CPT(R)] BLOOD TYPE, (ABO+RH) [99368 CPT(R)] CBC W/O DIFF [85447 CPT(R)] CULTURE, URINE [id number] CPT(R)] HEP B SURFACE AG D9635372 CPT(R)] HIV 1/2 AG/AB, 4TH GENERATION,W RFLX CONFIRM E109122 CPT(R)] PAP IG, CT-NG, HPV 16&18,45(058076, [id number]) [[id number] Custom] RUBELLA AB, IGG O2745321 CPT(R)] SMN1 COPY NUMBER ANALYSIS [[id number] Custom] T PALLIDUM SCREEN W/REFLEX [[id number] Custom] Patient Instructions Weeks 6 to 10 of Your Pregnancy: Care Instructions Your Care Instructions Congratulations on your pregnancy. This is an exciting and important time for you. During the first 6 to 10 weeks of your pregnancy, your body goes through many changes. Your baby grows very fast, even though you cannot feel it yet. You may start to notice that you feel different, both in your body and your emotions. Because each woman's pregnancy is unique, there is no right way to feel. You may feel the healthiest you have ever been, or you may feel tired or sick to your stomach (\"morning sickness\"). These early weeks are a time to make healthy choices and to eat the best foods for you and your baby. This care sheet will give you some ideas. This is also a good time to think about birth defects testing. These are tests done during pregnancy to look for possible problems with the baby. First trimester tests for birth defects can be done between 8 and 17 weeks of pregnancy, depending on the test. Talk with your doctor about what kinds of tests are available. Follow-up care is a key part of your treatment and safety. Be sure to make and go to all appointments, and call your doctor if you are having problems. It's also a good idea to know your test results and keep a list of the medicines you take. How can you care for yourself at home? Eat well · Eat at least 3 meals and 2 healthy snacks every day. Eat fresh, whole foods, including: ¨ 7 or more servings of bread, tortillas, cereal, rice, pasta, or oatmeal. 
¨ 3 or more servings of vegetables, especially leafy green vegetables. ¨ 2 or more servings of fruits. ¨ 3 or more servings of milk, yogurt, or cheese. ¨ 2 or more servings of meat, turkey, chicken, fish, eggs, or dried beans. · Drink plenty of fluids, especially water. Avoid sodas and other sweetened drinks. · Choose foods that have important vitamins for your baby, such as calcium, iron, and folate. ¨ Dairy products, tofu, canned fish with bones, almonds, broccoli, dark leafy greens, corn tortillas, and fortified orange juice are good sources of calcium. ¨ Beef, poultry, liver, spinach, lentils, dried beans, fortified cereals, and dried fruits are rich in iron. ¨ Dark leafy greens, broccoli, asparagus, liver, fortified cereals, orange juice, peanuts, and almonds are good sources of folate. · Avoid foods that could harm your baby. ¨ Do not eat raw or undercooked meat, chicken, or fish (such as sushi or raw oysters). ¨ Do not eat raw eggs or foods that contain raw eggs, such as Caesar dressing. ¨ Do not eat soft cheeses and unpasteurized dairy foods, such as Brie, feta, or blue cheese. ¨ Do not eat fish that contains a lot of mercury, such as shark, swordfish, tilefish, or charlie mackerel. Do not eat more than 6 ounces of tuna each week. ¨ Do not eat raw sprouts, especially alfalfa sprouts. ¨ Cut down on caffeine, such as coffee, tea, and cola. Protect yourself and your baby · Do not touch francisco litter or cat feces. They can cause an infection that could harm your baby. · High body temperature can be harmful to your baby. So if you want to use a sauna or hot tub, be sure to talk to your doctor about how to use it safely. [place] with morning sickness · Sip small amounts of water, juices, or shakes. Try drinking between meals, not with meals. · Eat 5 or 6 small meals a day. Try dry toast or crackers when you first get up, and eat breakfast a little later. · Avoid spicy, greasy, and fatty foods. · When you feel sick, open your windows or go for a short walk to get fresh air. · Try nausea wristbands. These help some women. · Tell your doctor if you think your prenatal vitamins make you sick. Where can you learn more? Go to http://remi-kannan.info/. Enter G112 in the search box to learn more about \"Weeks 6 to 10 of Your Pregnancy: Care Instructions. \" Current as of: March 16, 2017 Content Version: 11.4 © 5310-5593 [employer]. Care instructions adapted under license by [employer] (which disclaims liability or warranty for this information). If you have questions about a medical condition or this instruction, always ask your healthcare professional. [place] [address] any warranty or liability for your use of this information. Introducing [facility] & HEALTH SERVICES! Dear Gladis Amador: Thank you for requesting a [employer] account. Our records indicate that you already have an active Share[employer] account. You can access your account anytime at [email or web address] Did you know that you can access your hospital and ER discharge instructions at any time in Sharecare? You can also review all of your test results from your hospital stay or ER visit. Additional Information If you have questions, please visit the Frequently Asked Questions section of the I[employer]yshart website at [email or web address] Remember, Go[employer]ared is NOT to be used for urgent needs. For medical emergencies, dial 911. Now available from your iPhone and Android! Please provide this summary of care documentation to your next provider. Your primary care clinician is listed as Mary Alvarez. If you have any questions after today's visit, please call [phone].

## 2018-02-21 NOTE — PATIENT INSTRUCTIONS
Weeks 6 to 10 of Your Pregnancy: Care Instructions  Your Care Instructions    Congratulations on your pregnancy. This is an exciting and important time for you. During the first 6 to 10 weeks of your pregnancy, your body goes through many changes. Your baby grows very fast, even though you cannot feel it yet. You may start to notice that you feel different, both in your body and your emotions. Because each woman's pregnancy is unique, there is no right way to feel. You may feel the healthiest you have ever been, or you may feel tired or sick to your stomach (\"morning sickness\"). These early weeks are a time to make healthy choices and to eat the best foods for you and your baby. This care sheet will give you some ideas. This is also a good time to think about birth defects testing. These are tests done during pregnancy to look for possible problems with the baby. First trimester tests for birth defects can be done between 8 and 17 weeks of pregnancy, depending on the test. Talk with your doctor about what kinds of tests are available. Follow-up care is a key part of your treatment and safety. Be sure to make and go to all appointments, and call your doctor if you are having problems. It's also a good idea to know your test results and keep a list of the medicines you take. How can you care for yourself at home? Eat well  · Eat at least 3 meals and 2 healthy snacks every day. Eat fresh, whole foods, including:  ¨ 7 or more servings of bread, tortillas, cereal, rice, pasta, or oatmeal.  ¨ 3 or more servings of vegetables, especially leafy green vegetables. ¨ 2 or more servings of fruits. ¨ 3 or more servings of milk, yogurt, or cheese. ¨ 2 or more servings of meat, turkey, chicken, fish, eggs, or dried beans. · Drink plenty of fluids, especially water. Avoid sodas and other sweetened drinks. · Choose foods that have important vitamins for your baby, such as calcium, iron, and folate.   ¨ Dairy products, tofu, canned fish with bones, almonds, broccoli, dark leafy greens, corn tortillas, and fortified orange juice are good sources of calcium. ¨ Beef, poultry, liver, spinach, lentils, dried beans, fortified cereals, and dried fruits are rich in iron. ¨ Dark leafy greens, broccoli, asparagus, liver, fortified cereals, orange juice, peanuts, and almonds are good sources of folate. · Avoid foods that could harm your baby. ¨ Do not eat raw or undercooked meat, chicken, or fish (such as sushi or raw oysters). ¨ Do not eat raw eggs or foods that contain raw eggs, such as Caesar dressing. ¨ Do not eat soft cheeses and unpasteurized dairy foods, such as Brie, feta, or blue cheese. ¨ Do not eat fish that contains a lot of mercury, such as shark, swordfish, tilefish, or charlie mackerel. Do not eat more than 6 ounces of tuna each week. ¨ Do not eat raw sprouts, especially alfalfa sprouts. ¨ Cut down on caffeine, such as coffee, tea, and cola. Protect yourself and your baby  · Do not touch francisco litter or cat feces. They can cause an infection that could harm your baby. · High body temperature can be harmful to your baby. So if you want to use a sauna or hot tub, be sure to talk to your doctor about how to use it safely. [place] with morning sickness  · Sip small amounts of water, juices, or shakes. Try drinking between meals, not with meals. · Eat 5 or 6 small meals a day. Try dry toast or crackers when you first get up, and eat breakfast a little later. · Avoid spicy, greasy, and fatty foods. · When you feel sick, open your windows or go for a short walk to get fresh air. · Try nausea wristbands. These help some women. · Tell your doctor if you think your prenatal vitamins make you sick. Where can you learn more? Go to http://rosanna.info/. Enter G112 in the search box to learn more about \"Weeks 6 to 10 of Your Pregnancy: Care Instructions. \"  Current as of: March 16, 2017  Content Version: 11.4  © 3198-0424 [employer]. Care instructions adapted under license by [employer] (which disclaims liability or warranty for this information). If you have questions about a medical condition or this instruction, always ask your healthcare professional. [address] any warranty or liability for your use of this information.

## 2018-02-23 LAB — BACTERIA UR CULT: NO GROWTH

## 2018-02-24 LAB
ABO GROUP BLD: NORMAL
BLD GP AB SCN SERPL QL: NEGATIVE
C TRACH RRNA CVX QL NAA+PROBE: NEGATIVE
CYTOLOGIST CVX/VAG CYTO: NORMAL
CYTOLOGY CVX/VAG DOC THIN PREP: NORMAL
CYTOLOGY HISTORY:: NORMAL
DX ICD CODE: NORMAL
ERYTHROCYTE [DISTWIDTH] IN BLOOD BY AUTOMATED COUNT: 13.3 % (ref 12.3–15.4)
HBV SURFACE AG SERPL QL IA: NEGATIVE
HCT VFR BLD AUTO: 39.6 % (ref 34–46.6)
HGB A MFR BLD: 97.9 % (ref 96.4–98.8)
HGB A2 MFR BLD COLUMN CHROM: 2.1 % (ref 1.8–3.2)
HGB BLD-MCNC: 13.2 G/DL (ref 11.1–15.9)
HGB C MFR BLD: 0 %
HGB F MFR BLD: 0 % (ref 0–2)
HGB FRACT BLD-IMP: NORMAL
HGB OTHER MFR BLD HPLC: 0 %
HGB S BLD QL SOLY: NEGATIVE
HGB S MFR BLD: 0 %
HIV 1+2 AB+HIV1 P24 AG SERPL QL IA: NON REACTIVE
HPV I/H RISK 1 DNA CVX QL PROBE+SIG AMP: NEGATIVE
Lab: NORMAL
MCH RBC QN AUTO: 30.3 PG (ref 26.6–33)
MCHC RBC AUTO-ENTMCNC: 33.3 G/DL (ref 31.5–35.7)
MCV RBC AUTO: 91 FL (ref 79–97)
N GONORRHOEA RRNA CVX QL NAA+PROBE: NEGATIVE
OTHER STN SPEC: NORMAL
PATH REPORT.FINAL DX SPEC: NORMAL
PLATELET # BLD AUTO: 176 X10E3/UL (ref 150–379)
RBC # BLD AUTO: 4.36 X10E6/UL (ref 3.77–5.28)
RH BLD: POSITIVE
RUBV IGG SERPL IA-ACNC: 3.82 INDEX
STAT OF ADQ CVX/VAG CYTO-IMP: NORMAL
T PALLIDUM AB SER QL IA: NEGATIVE
WBC # BLD AUTO: 8.3 X10E3/UL (ref 3.4–10.8)

## 2018-02-24 NOTE — PROGRESS NOTES
Normal results, add to prenatal records. We can review in detail with patient at next visit.   Pap wnl update

## 2018-03-05 ENCOUNTER — PATIENT MESSAGE (OUTPATIENT)
Dept: OBGYN CLINIC | Age: 33
End: 2018-03-05

## 2018-03-05 DIAGNOSIS — Z34.80 ENCOUNTER FOR SUPERVISION OF OTHER NORMAL PREGNANCY, UNSPECIFIED TRIMESTER: ICD-10-CM

## 2018-03-05 RX ORDER — PROMETHAZINE HYDROCHLORIDE 25 MG/1
25 TABLET ORAL
Qty: 24 TAB | Refills: 1 | Status: SHIPPED | OUTPATIENT
Start: 2018-03-05 | End: 2018-03-29

## 2018-03-21 ENCOUNTER — ROUTINE PRENATAL (OUTPATIENT)
Dept: OBGYN CLINIC | Age: 33
End: 2018-03-21

## 2018-03-21 VITALS
BODY MASS INDEX: 23.29 KG/M2 | HEIGHT: 67 IN | DIASTOLIC BLOOD PRESSURE: 66 MMHG | SYSTOLIC BLOOD PRESSURE: 110 MMHG | WEIGHT: 148.4 LBS

## 2018-03-21 DIAGNOSIS — Z34.80 ENCOUNTER FOR SUPERVISION OF OTHER NORMAL PREGNANCY, UNSPECIFIED TRIMESTER: Primary | ICD-10-CM

## 2018-03-21 NOTE — MR AVS SNAPSHOT
900 [facility] [address] [facility] 
[phone] Patient: Marisol Ward MRN: [id number] RIB:[id number] Visit Information Date & Time Provider Department Dept. Phone Encounter #  
 3/21/2018  1:10 PM Gamaliel Clement MD [employer] [id number] Upcoming Health Maintenance Date Due  
 PAP AKA CERVICAL CYTOLOGY 2/21/2021 Allergies as of 3/21/2018  Review Complete On: 3/21/2018 By: Tyler Olivera LPN Severity Noted Reaction Type Reactions Ceftin [Cefuroxime Axetil]  11/30/2010    Hives Gardasil [H Papillomavirus Vac,qval (Pf)]  09/07/2012    Other (comments)  
 syncope Current Immunizations  Reviewed on 10/16/2017 Name Date Hep A Vaccine (Adult) 1/30/2014 Human Papillomavirus 1/1/2006 Influenza Vaccine 8/19/2017, 9/16/2014, 8/24/2013 TDAP Vaccine 9/7/2012 Tdap 4/16/2015 Not reviewed this visit Vitals BP Height(growth percentile) Weight(growth percentile) LMP BMI OB Status 110/66 5' 7\" (1.702 m) 148 lb 6.4 oz (67.3 kg) 12/24/2017 (Exact Date) 23.24 kg/m2 Pregnant Smoking Status Never Smoker BMI and BSA Data Body Mass Index Body Surface Area  
 23.24 kg/m 2 1.78 m 2 Preferred Pharmacy Pharmacy Name Phone [facility] [address] [place] [address] [address]  [facility] [place] [phone] Your Updated Medication List  
  
   
This list is accurate as of 3/21/18  1:13 PM.  Always use your most recent med list.  
  
  
  
  
 azelaic acid 15 % topical gel Commonly known as:  [email or web address] Apply  to affected area two (2) times a day. * ondansetron 4 mg disintegrating tablet Commonly known as:  ZOFRAN ODT Take 1 Tab by mouth every eight (8) hours as needed for Nausea. * ondansetron 8 mg disintegrating tablet Commonly known as:  ZOFRAN ODT  
 Take 1 Tab by mouth every eight (8) hours as needed for Nausea. promethazine 25 mg tablet Commonly known as:  PHENERGAN Take 1 Tab by mouth every six (6) hours as needed for Nausea. * Notice: This list has 2 medication(s) that are the same as other medications prescribed for you. Read the directions carefully, and ask your doctor or other care provider to review them with you. Patient Instructions Weeks 10 to 14 of Your Pregnancy: Care Instructions Your Care Instructions By weeks 10 to 15 of your pregnancy, the placenta has formed inside your uterus. It is possible to hear your baby's heartbeat with a special ultrasound device. Your baby's eyes can and do move. The arms and legs can bend. This is a good time to think about testing for birth defects. There are two types of tests: screening and diagnostic. Screening tests show the chance that a baby has a certain birth defect. They can't tell you for sure that your baby has a problem. Diagnostic tests show if a baby has a certain birth defect. It's your choice whether to have these tests. You and your partner can talk to your doctor or midwife about birth defects tests. Follow-up care is a key part of your treatment and safety. Be sure to make and go to all appointments, and call your doctor if you are having problems. It's also a good idea to know your test results and keep a list of the medicines you take. How can you care for yourself at home? Decide about tests · You can have screening tests and diagnostic tests to check for birth defects. The decision to have a test for birth defects is personal. Think about your age, your chance of passing on a family disease, your need to know about any problems, and what you might do after you have the test results. ¨ Triple or quadruple (quad) blood tests. These screening tests can be done between 15 and 20 weeks of pregnancy.  They check the amounts of three or four substances in your blood. The doctor looks at these test results, along with your age and other factors, to find out the chance that your baby may have certain problems. ¨ Amniocentesis. This diagnostic test is used to look for chromosomal problems in the baby's cells. It can be done between 15 and 20 weeks of pregnancy, usually around week 16. 
¨ Nuchal translucency test. This test uses ultrasound to measure the thickness of the area at the back of the baby's neck. An increase in the thickness can be an early sign of Down syndrome. ¨ Chorionic villus sampling (CVS). This is a test that looks for certain genetic problems with your baby. The same genes that are in your baby are in the placenta. A small piece of the placenta is taken out and tested. This test is done when you are 10 to 13 weeks pregnant. Ease discomfort · Slow down and take naps when you feel tired. · If your emotions swing, talk to someone. Crying, anxiety, and concentration problems are common. · If your gums bleed, try a softer toothbrush. If your gums are puffy and bleed a lot, see your dentist. 
· If you feel dizzy: ¨ Get up slowly after sitting or lying down. ¨ Drink plenty of fluids. ¨ Eat small snacks to keep your blood sugar stable. ¨ Put your head between your legs as though you were tying your shoelaces. ¨ Lie down with your legs higher than your head. Use pillows to prop up your feet. · If you have a headache: 
¨ Lie down. ¨ Ask your partner or a good friend for a neck massage. ¨ Try cool cloths over your forehead or across the back of your neck. ¨ Use acetaminophen (Tylenol) for pain relief. Do not use nonsteroidal anti-inflammatory drugs (NSAIDs), such as ibuprofen (Advil, Motrin) or naproxen (Aleve), unless your doctor says it is okay. · If you have a nosebleed, pinch your nose gently, and hold it for a short while.  To prevent nosebleeds, try massaging a small dab of petroleum jelly, such as Vaseline, in your nostrils. · If your nose is stuffed up, try saline (saltwater) nose sprays. Do not use decongestant sprays. Care for your breasts · Wear a bra that gives you good support. · Know that changes in your breasts are normal. 
¨ Your breasts may get larger and more tender. Tenderness usually gets better by 12 weeks. ¨ Your nipples may get darker and larger, and small bumps around your nipples may show more. ¨ The veins in your chest and breasts may show more. · Don't worry about \"toughening'\" your nipples. Breastfeeding will naturally do this. Where can you learn more? Go to http://remi-kannan.info/. Enter G231 in the search box to learn more about \"Weeks 10 to 14 of Your Pregnancy: Care Instructions. \" Current as of: March 16, 2017 Content Version: 11.4 © 5878-0593 [employer]. Care instructions adapted under license by [employer] (which disclaims liability or warranty for this information). If you have questions about a medical condition or this instruction, always ask your healthcare professional. [place] [address] any warranty or liability for your use of this information. Introducing [facility] & HEALTH SERVICES! Dear Grey Henderson: Thank you for requesting a [employer] account. Our records indicate that you already have an active [employer] account. You can access your account anytime at [email or web address] Did you know that you can access your hospital and ER discharge instructions at any time in [employer]? You can also review all of your test results from your hospital stay or ER visit. Additional Information If you have questions, please visit the Frequently Asked Questions section of the [employer] website at [email or web address] Remember, [employer] is NOT to be used for urgent needs. For medical emergencies, dial 911. Now available from your iPhone and Android! Please provide this summary of care documentation to your next provider. Your primary care clinician is listed as Pam Ferrell. If you have any questions after today's visit, please call [phone].

## 2018-03-21 NOTE — PATIENT INSTRUCTIONS

## 2018-03-21 NOTE — PROGRESS NOTES
[facility]  [email or web address]/  [phone]    Gala Ely MD, FACOG     Follow-up OB visit    Chief Complaint   Patient presents with    Routine Prenatal Visit       Vitals:    18 1311   BP: 110/66   Weight: 148 lb 6.4 oz (67.3 kg)   Height: 5' 7\" (1.702 m)       Patient Active Problem List    Diagnosis Date Noted    Encounter for supervision of other normal pregnancy, unspecified trimester 2018    Scoliosis     Normal labor and delivery 2015    GERD (gastroesophageal reflux disease) 2015    Migraine 2013    Eczema 2013    Contusion of left knee 2013    Acne        The patient reports the following concerns: none  Nausea and vomiting greatly improved. See PN flowsheet for exam    33 y.o.  12w3d   Encounter Diagnosis   Name Primary?  Encounter for supervision of other normal pregnancy, unspecified trimester Yes     rf zofran prn  Bowel regimen   [] SAB/bleeding precautions reviewed   [] PTL/PPROM precautions reviewed   [] Labor precautions reviewed   [] Fetal kick counts discussed   [] Labs reviewed with patient   [] [place] precautions reviewed   [] Consent reviewed   [] Handouts given to pt   [] Glucola handout    [] GBS/labor/Magic Hour handout   []    []    []    []    Follow-up Disposition: Not on File    No orders of the defined types were placed in this encounter.       Gala Ely MD

## 2018-03-24 ENCOUNTER — HOSPITAL ENCOUNTER (EMERGENCY)
Age: 33
Discharge: HOME | End: 2018-03-24
Attending: EMERGENCY MEDICINE
Payer: COMMERCIAL

## 2018-03-24 VITALS
TEMPERATURE: 98.3 F | WEIGHT: 142 LBS | HEART RATE: 85 BPM | HEIGHT: 67 IN | BODY MASS INDEX: 22.29 KG/M2 | DIASTOLIC BLOOD PRESSURE: 65 MMHG | SYSTOLIC BLOOD PRESSURE: 118 MMHG | RESPIRATION RATE: 16 BRPM | OXYGEN SATURATION: 100 %

## 2018-03-24 DIAGNOSIS — Z3A.12 12 WEEKS GESTATION OF PREGNANCY: ICD-10-CM

## 2018-03-24 DIAGNOSIS — R11.2 NAUSEA AND VOMITING, INTRACTABILITY OF VOMITING NOT SPECIFIED, UNSPECIFIED VOMITING TYPE: Primary | ICD-10-CM

## 2018-03-24 LAB
ALBUMIN SERPL-MCNC: 3.2 G/DL (ref 3.5–5)
ALBUMIN/GLOB SERPL: 0.8 {RATIO} (ref 1.1–2.2)
ALP SERPL-CCNC: 51 U/L (ref 45–117)
ALT SERPL-CCNC: 21 U/L (ref 12–78)
ANION GAP SERPL CALC-SCNC: 12 MMOL/L (ref 5–15)
APPEARANCE UR: CLEAR
AST SERPL-CCNC: 21 U/L (ref 15–37)
BACTERIA URNS QL MICRO: NEGATIVE /HPF
BASOPHILS # BLD: 0 K/UL (ref 0–0.1)
BASOPHILS NFR BLD: 0 % (ref 0–1)
BILIRUB SERPL-MCNC: 0.2 MG/DL (ref 0.2–1)
BILIRUB UR QL: NEGATIVE
BUN SERPL-MCNC: 6 MG/DL (ref 6–20)
BUN/CREAT SERPL: 10 (ref 12–20)
CALCIUM SERPL-MCNC: 9.3 MG/DL (ref 8.5–10.1)
CHLORIDE SERPL-SCNC: 102 MMOL/L (ref 97–108)
CO2 SERPL-SCNC: 23 MMOL/L (ref 21–32)
COLOR UR: NORMAL
CREAT SERPL-MCNC: 0.6 MG/DL (ref 0.55–1.02)
DIFFERENTIAL METHOD BLD: ABNORMAL
EOSINOPHIL # BLD: 0 K/UL (ref 0–0.4)
EOSINOPHIL NFR BLD: 0 % (ref 0–7)
EPITH CASTS URNS QL MICRO: NORMAL /LPF
ERYTHROCYTE [DISTWIDTH] IN BLOOD BY AUTOMATED COUNT: 13 % (ref 11.5–14.5)
GLOBULIN SER CALC-MCNC: 4.1 G/DL (ref 2–4)
GLUCOSE SERPL-MCNC: 100 MG/DL (ref 65–100)
GLUCOSE UR STRIP.AUTO-MCNC: NEGATIVE MG/DL
HCT VFR BLD AUTO: 38.1 % (ref 35–47)
HGB BLD-MCNC: 13.2 G/DL (ref 11.5–16)
HGB UR QL STRIP: NEGATIVE
HYALINE CASTS URNS QL MICRO: NORMAL /LPF (ref 0–5)
IMM GRANULOCYTES # BLD: 0 K/UL (ref 0–0.04)
IMM GRANULOCYTES NFR BLD AUTO: 0 % (ref 0–0.5)
KETONES UR QL STRIP.AUTO: NEGATIVE MG/DL
LEUKOCYTE ESTERASE UR QL STRIP.AUTO: NEGATIVE
LYMPHOCYTES # BLD: 0.8 K/UL (ref 0.8–3.5)
LYMPHOCYTES NFR BLD: 11 % (ref 12–49)
MCH RBC QN AUTO: 30.8 PG (ref 26–34)
MCHC RBC AUTO-ENTMCNC: 34.6 G/DL (ref 30–36.5)
MCV RBC AUTO: 89 FL (ref 80–99)
MONOCYTES # BLD: 0.5 K/UL (ref 0–1)
MONOCYTES NFR BLD: 7 % (ref 5–13)
NEUTS SEG # BLD: 6.2 K/UL (ref 1.8–8)
NEUTS SEG NFR BLD: 82 % (ref 32–75)
NITRITE UR QL STRIP.AUTO: NEGATIVE
NRBC # BLD: 0 K/UL (ref 0–0.01)
NRBC BLD-RTO: 0 PER 100 WBC
PH UR STRIP: 7.5 [PH] (ref 5–8)
PLATELET # BLD AUTO: 168 K/UL (ref 150–400)
PMV BLD AUTO: 10.2 FL (ref 8.9–12.9)
POTASSIUM SERPL-SCNC: 3.4 MMOL/L (ref 3.5–5.1)
PROT SERPL-MCNC: 7.3 G/DL (ref 6.4–8.2)
PROT UR STRIP-MCNC: NEGATIVE MG/DL
RBC # BLD AUTO: 4.28 M/UL (ref 3.8–5.2)
RBC #/AREA URNS HPF: NORMAL /HPF (ref 0–5)
SODIUM SERPL-SCNC: 137 MMOL/L (ref 136–145)
SP GR UR REFRACTOMETRY: 1.01 (ref 1–1.03)
UA: UC IF INDICATED,UAUC: NORMAL
UROBILINOGEN UR QL STRIP.AUTO: 0.2 EU/DL (ref 0.2–1)
WBC # BLD AUTO: 7.6 K/UL (ref 3.6–11)
WBC URNS QL MICRO: NORMAL /HPF (ref 0–4)

## 2018-03-24 PROCEDURE — 81001 URINALYSIS AUTO W/SCOPE: CPT | Performed by: EMERGENCY MEDICINE

## 2018-03-24 PROCEDURE — 85025 COMPLETE CBC W/AUTO DIFF WBC: CPT | Performed by: EMERGENCY MEDICINE

## 2018-03-24 PROCEDURE — 36415 COLL VENOUS BLD VENIPUNCTURE: CPT | Performed by: EMERGENCY MEDICINE

## 2018-03-24 PROCEDURE — 96374 THER/PROPH/DIAG INJ IV PUSH: CPT

## 2018-03-24 PROCEDURE — 96361 HYDRATE IV INFUSION ADD-ON: CPT

## 2018-03-24 PROCEDURE — 74011250636 HC RX REV CODE- 250/636: Performed by: EMERGENCY MEDICINE

## 2018-03-24 PROCEDURE — 99282 EMERGENCY DEPT VISIT SF MDM: CPT

## 2018-03-24 PROCEDURE — 99283 EMERGENCY DEPT VISIT LOW MDM: CPT

## 2018-03-24 PROCEDURE — 80053 COMPREHEN METABOLIC PANEL: CPT | Performed by: EMERGENCY MEDICINE

## 2018-03-24 RX ORDER — ONDANSETRON 2 MG/ML
4 INJECTION INTRAMUSCULAR; INTRAVENOUS
Status: COMPLETED | OUTPATIENT
Start: 2018-03-24 | End: 2018-03-24

## 2018-03-24 RX ADMIN — ONDANSETRON 4 MG: 2 INJECTION INTRAMUSCULAR; INTRAVENOUS at 20:28

## 2018-03-24 RX ADMIN — SODIUM CHLORIDE 1000 ML: 900 INJECTION, SOLUTION INTRAVENOUS at 20:28

## 2018-03-24 NOTE — ED TRIAGE NOTES
Pt.  is 12 weeks pregnant and has thrown up 9x today, feels weak and noted fever earlier today. Pt. States took zofran 8 mg at 1000 and 1630. Took tylenol at 1000 this morning as well.

## 2018-03-25 NOTE — DISCHARGE INSTRUCTIONS
Nausea and Vomiting: Care Instructions  Your Care Instructions    When you are nauseated, you may feel weak and sweaty and notice a lot of saliva in your mouth. Nausea often leads to vomiting. Most of the time you do not need to worry about nausea and vomiting, but they can be signs of other illnesses. Two common causes of nausea and vomiting are stomach flu and food poisoning. Nausea and vomiting from viral stomach flu will usually start to improve within 24 hours. Nausea and vomiting from food poisoning may last from 12 to 48 hours. The doctor has checked you carefully, but problems can develop later. If you notice any problems or new symptoms, get medical treatment right away. Follow-up care is a key part of your treatment and safety. Be sure to make and go to all appointments, and call your doctor if you are having problems. It's also a good idea to know your test results and keep a list of the medicines you take. How can you care for yourself at home? · To prevent dehydration, drink plenty of fluids, enough so that your urine is light yellow or clear like water. Choose water and other caffeine-free clear liquids until you feel better. If you have kidney, heart, or liver disease and have to limit fluids, talk with your doctor before you increase the amount of fluids you drink. · Rest in bed until you feel better. · When you are able to eat, try clear soups, mild foods, and liquids until all symptoms are gone for 12 to 48 hours. Other good choices include dry toast, crackers, cooked cereal, and gelatin dessert, such as Jell-O. When should you call for help? Call 911 anytime you think you may need emergency care. For example, call if:  ? · You passed out (lost consciousness). ?Call your doctor now or seek immediate medical care if:  ? · You have symptoms of dehydration, such as:  ¨ Dry eyes and a dry mouth. ¨ Passing only a little dark urine.   ¨ Feeling thirstier than usual.   ? · You have new or worsening belly pain. ? · You have a new or higher fever. ? · You vomit blood or what looks like coffee grounds. ? Watch closely for changes in your health, and be sure to contact your doctor if:  ? · You have ongoing nausea and vomiting. ? · Your vomiting is getting worse. ? · Your vomiting lasts longer than 2 days. ? · You are not getting better as expected. Where can you learn more? Go to http://remi-kannan.info/. Enter 25 [id number] in the search box to learn more about \"Nausea and Vomiting: Care Instructions. \"  Current as of: March 20, 2017  Content Version: 11.4  © 0724-2496 [employer]. Care instructions adapted under license by [employer] (which disclaims liability or warranty for this information). If you have questions about a medical condition or this instruction, always ask your healthcare professional. [place] [address] any warranty or liability for your use of this information. Weeks 10 to 14 of Your Pregnancy: Care Instructions  Your Care Instructions    By weeks 10 to 14 of your pregnancy, the placenta has formed inside your uterus. It is possible to hear your baby's heartbeat with a special ultrasound device. Your baby's eyes can and do move. The arms and legs can bend. This is a good time to think about testing for birth defects. There are two types of tests: screening and diagnostic. Screening tests show the chance that a baby has a certain birth defect. They can't tell you for sure that your baby has a problem. Diagnostic tests show if a baby has a certain birth defect. It's your choice whether to have these tests. You and your partner can talk to your doctor or midwife about birth defects tests. Follow-up care is a key part of your treatment and safety. Be sure to make and go to all appointments, and call your doctor if you are having problems.  It's also a good idea to know your test results and keep a list of the medicines you take. How can you care for yourself at home? Decide about tests  · You can have screening tests and diagnostic tests to check for birth defects. The decision to have a test for birth defects is personal. Think about your age, your chance of passing on a family disease, your need to know about any problems, and what you might do after you have the test results. ¨ Triple or quadruple (quad) blood tests. These screening tests can be done between 15 and 20 weeks of pregnancy. They check the amounts of three or four substances in your blood. The doctor looks at these test results, along with your age and other factors, to find out the chance that your baby may have certain problems. ¨ Amniocentesis. This diagnostic test is used to look for chromosomal problems in the baby's cells. It can be done between 15 and 20 weeks of pregnancy, usually around week 16.  ¨ Nuchal translucency test. This test uses ultrasound to measure the thickness of the area at the back of the baby's neck. An increase in the thickness can be an early sign of Down syndrome. ¨ Chorionic villus sampling (CVS). This is a test that looks for certain genetic problems with your baby. The same genes that are in your baby are in the placenta. A small piece of the placenta is taken out and tested. This test is done when you are 10 to 13 weeks pregnant. Ease discomfort  · Slow down and take naps when you feel tired. · If your emotions swing, talk to someone. Crying, anxiety, and concentration problems are common. · If your gums bleed, try a softer toothbrush. If your gums are puffy and bleed a lot, see your dentist.  · If you feel dizzy:  ¨ Get up slowly after sitting or lying down. ¨ Drink plenty of fluids. ¨ Eat small snacks to keep your blood sugar stable. ¨ Put your head between your legs as though you were tying your shoelaces. ¨ Lie down with your legs higher than your head. Use pillows to prop up your feet.   · If you have a headache:  ¨ Lie down. ¨ Ask your partner or a good friend for a neck massage. ¨ Try cool cloths over your forehead or across the back of your neck. ¨ Use acetaminophen (Tylenol) for pain relief. Do not use nonsteroidal anti-inflammatory drugs (NSAIDs), such as ibuprofen (Advil, Motrin) or naproxen (Aleve), unless your doctor says it is okay. · If you have a nosebleed, pinch your nose gently, and hold it for a short while. To prevent nosebleeds, try massaging a small dab of petroleum jelly, such as Vaseline, in your nostrils. · If your nose is stuffed up, try saline (saltwater) nose sprays. Do not use decongestant sprays. Care for your breasts  · Wear a bra that gives you good support. · Know that changes in your breasts are normal.  ¨ Your breasts may get larger and more tender. Tenderness usually gets better by 12 weeks. ¨ Your nipples may get darker and larger, and small bumps around your nipples may show more. ¨ The veins in your chest and breasts may show more. · Don't worry about \"toughening'\" your nipples. Breastfeeding will naturally do this. Where can you learn more? Go to http://remi-kannan.info/. Enter O580 in the search box to learn more about \"Weeks 10 to 14 of Your Pregnancy: Care Instructions. \"  Current as of: March 16, 2017  Content Version: 11.4  © 6968-3866 Healthwise, Incorporated. Care instructions adapted under license by [employer] (which disclaims liability or warranty for this information). If you have questions about a medical condition or this instruction, always ask your healthcare professional. [address] any warranty or liability for your use of this information. We hope that we have addressed all of your medical concerns. The examination and treatment you received in the Emergency Department were for an emergent problem and were not intended as complete care.  It is important that you follow up with your healthcare provider(s) for ongoing care. If your symptoms worsen or do not improve as expected, and you are unable to reach your usual health care provider(s), you should return to the Emergency Department. Today's healthcare is undergoing tremendous change, and patient satisfaction surveys are one of the many tools to assess the quality of medical care. You may receive a survey from the [employer] regarding your experience in the Emergency Department. I hope that your experience has been completely positive, particularly the medical care that I provided. As such, please participate in the survey; anything less than excellent does not meet my expectations or intentions. [facility] and [facility] participate in nationally recognized quality of care measures. If your blood pressure is greater than 120/80, as reported below, we urge that you seek medical care to address the potential of high blood pressure, commonly known as hypertension. Hypertension can be hereditary or can be caused by certain medical conditions, pain, stress, or \"white coat syndrome. \"       Please make an appointment with your health care provider(s) for follow up of your Emergency Department visit. VITALS:   Patient Vitals for the past 8 hrs:   Temp Pulse Resp BP SpO2   03/24/18 1930 98.3 °F (36.8 °C) 100 19 124/71 100 %          Thank you for allowing us to provide you with medical care today. We realize that you have many choices for your emergency care needs. Please choose us in the future for any continued health care needs. [facility], [address] [place] [address] 20.   Office: [phone]            Recent Results (from the past 24 hour(s))   CBC WITH AUTOMATED DIFF    Collection Time: 03/24/18  8:27 PM   Result Value Ref Range    WBC 7.6 3.6 - 11.0 K/uL    RBC 4.28 3.80 - 5.20 M/uL    HGB 13.2 11.5 - 16.0 g/dL    HCT 38.1 35.0 - 47.0 %    MCV 89.0 80.0 - 99.0 FL    MCH 30.8 26.0 - 34.0 PG    MCHC 34.6 30.0 - 36.5 g/dL    RDW 13.0 11.5 - 14.5 %    PLATELET 433 171 - 780 K/uL    MPV 10.2 8.9 - 12.9 FL    NRBC 0.0 0  WBC    ABSOLUTE NRBC 0.00 0.00 - 0.01 K/uL    NEUTROPHILS 82 (H) 32 - 75 %    LYMPHOCYTES 11 (L) 12 - 49 %    MONOCYTES 7 5 - 13 %    EOSINOPHILS 0 0 - 7 %    BASOPHILS 0 0 - 1 %    IMMATURE GRANULOCYTES 0 0.0 - 0.5 %    ABS. NEUTROPHILS 6.2 1.8 - 8.0 K/UL    ABS. LYMPHOCYTES 0.8 0.8 - 3.5 K/UL    ABS. MONOCYTES 0.5 0.0 - 1.0 K/UL    ABS. EOSINOPHILS 0.0 0.0 - 0.4 K/UL    ABS. BASOPHILS 0.0 0.0 - 0.1 K/UL    ABS. IMM. GRANS. 0.0 0.00 - 0.04 K/UL    DF AUTOMATED     METABOLIC PANEL, COMPREHENSIVE    Collection Time: 03/24/18  8:27 PM   Result Value Ref Range    Sodium 137 136 - 145 mmol/L    Potassium 3.4 (L) 3.5 - 5.1 mmol/L    Chloride 102 97 - 108 mmol/L    CO2 23 21 - 32 mmol/L    Anion gap 12 5 - 15 mmol/L    Glucose 100 65 - 100 mg/dL    BUN 6 6 - 20 MG/DL    Creatinine 0.60 0.55 - 1.02 MG/DL    BUN/Creatinine ratio 10 (L) 12 - 20      GFR est AA >60 >60 ml/min/1.73m2    GFR est non-AA >60 >60 ml/min/1.73m2    Calcium 9.3 8.5 - 10.1 MG/DL    Bilirubin, total 0.2 0.2 - 1.0 MG/DL    ALT (SGPT) 21 12 - 78 U/L    AST (SGOT) 21 15 - 37 U/L    Alk.  phosphatase 51 45 - 117 U/L    Protein, total 7.3 6.4 - 8.2 g/dL    Albumin 3.2 (L) 3.5 - 5.0 g/dL    Globulin 4.1 (H) 2.0 - 4.0 g/dL    A-G Ratio 0.8 (L) 1.1 - 2.2     URINALYSIS W/ REFLEX CULTURE    Collection Time: 03/24/18  8:27 PM   Result Value Ref Range    Color YELLOW/STRAW      Appearance CLEAR CLEAR      Specific gravity 1.007 1.003 - 1.030      pH (UA) 7.5 5.0 - 8.0      Protein NEGATIVE  NEG mg/dL    Glucose NEGATIVE  NEG mg/dL    Ketone NEGATIVE  NEG mg/dL    Bilirubin NEGATIVE  NEG      Blood NEGATIVE  NEG      Urobilinogen 0.2 0.2 - 1.0 EU/dL    Nitrites NEGATIVE  NEG      Leukocyte Esterase NEGATIVE  NEG      WBC 0-4 0 - 4 /hpf    RBC 0-5 0 - 5 /hpf Epithelial cells FEW FEW /lpf    Bacteria NEGATIVE  NEG /hpf    UA:UC IF INDICATED CULTURE NOT INDICATED BY UA RESULT CNI      Hyaline cast 0-2 0 - 5 /lpf       No results found.

## 2018-03-26 ENCOUNTER — TELEPHONE (OUTPATIENT)
Dept: OBGYN CLINIC | Age: 33
End: 2018-03-26

## 2018-03-26 NOTE — TELEPHONE ENCOUNTER
Patient left VM on NAHOMI Triage line stating that she was seen in the [facility] ER over the weekend for nausea and vomiting in pregnancy. Patient states that she was advised by the ER to have contact her obstetrician and let them know she was seen in the ER and if she needed a follow up visit in our office. Patient's next FOB appt is scheduled for 04/18/18    Please advise.

## 2018-03-29 ENCOUNTER — ROUTINE PRENATAL (OUTPATIENT)
Dept: OBGYN CLINIC | Age: 33
End: 2018-03-29

## 2018-03-29 VITALS
WEIGHT: 145.2 LBS | DIASTOLIC BLOOD PRESSURE: 70 MMHG | HEIGHT: 67 IN | SYSTOLIC BLOOD PRESSURE: 110 MMHG | BODY MASS INDEX: 22.79 KG/M2

## 2018-03-29 DIAGNOSIS — Z34.80 ENCOUNTER FOR SUPERVISION OF OTHER NORMAL PREGNANCY, UNSPECIFIED TRIMESTER: Primary | ICD-10-CM

## 2018-03-29 DIAGNOSIS — O21.9 NAUSEA AND VOMITING IN PREGNANCY: ICD-10-CM

## 2018-03-29 RX ORDER — ONDANSETRON 8 MG/1
8 TABLET, ORALLY DISINTEGRATING ORAL
Qty: 30 TAB | Refills: 2 | Status: SHIPPED | OUTPATIENT
Start: 2018-03-29 | End: 2018-06-27 | Stop reason: SDUPTHER

## 2018-03-29 NOTE — MR AVS SNAPSHOT
[facility] [address] [place] [address] 
[phone] Patient: Simon Mcelroy MRN: [id number] VKP:8/96/2069 Visit Information Date & Time Provider Department Dept. Phone Encounter #  
 3/29/2018 10:30 AM MD Brodie Barcenas [phone] [id number] Your Appointments 5/23/2018  1:00 PM  
ULTRASOUND with ULTRASOUND1GERARDO ([address]) Appt Note: 4wk fob with 20wk u/s   TP; .  
 [address] [place] [address]  
[phone]  
  
   
 [address] [address]  
  
    
  
 4/18/2018  1:20 PM  
OB VISIT with MD Brodie Barcenas ([facility]) Appt Note: 4wk fob   16wk   TP  
 [address] [place] [address]  
[phone]  
  
   
 [address] [address] [facility]  
  
    
 5/23/2018  1:30 PM  
OB VISIT with MD Brodie Barcenas ([facility]) Appt Note: . [address] [place] [address]  
[phone] 6/27/2018  1:00 PM  
OB VISIT with MD Brodie Barcenas ([address]) Appt Note: 4wk fob 24wk   TP  
 [address] [place] [address]  
[phone] Upcoming Health Maintenance Date Due  
 PAP AKA CERVICAL CYTOLOGY 2/21/2021 Allergies as of 3/29/2018  Review Complete On: 3/29/2018 By: Reji Mcallister LPN Severity Noted Reaction Type Reactions Bactrim [Sulfamethoprim]  03/24/2018    Hives Ceftin [Cefuroxime Axetil]  11/30/2010    Hives Gardasil [H Papillomavirus Vac,qval (Pf)]  09/07/2012    Other (comments)  
 syncope Current Immunizations  Reviewed on 10/16/2017 Name Date Hep A Vaccine (Adult) 1/30/2014 Human Papillomavirus 1/1/2006 Influenza Vaccine 8/19/2017, 9/16/2014, 8/24/2013 TDAP Vaccine 9/7/2012 Tdap 4/16/2015 Not reviewed this visit Vitals BP Height(growth percentile) Weight(growth percentile) LMP BMI OB Status 110/70 5' 7\" (1.702 m) 145 lb 3.2 oz (65.9 kg) 12/24/2017 (Exact Date) 22.74 kg/m2 Pregnant Smoking Status Never Smoker Vitals History BMI and BSA Data Body Mass Index Body Surface Area 22.74 kg/m 2 1.77 m 2 Preferred Pharmacy Pharmacy Name Phone [facility] [address]  [facility] [phone] Your Updated Medication List  
  
   
This list is accurate as of 3/29/18 10:49 AM.  Always use your most recent med list.  
  
  
  
  
 azelaic acid 15 % topical gel Commonly known as:  Vinja Apply  to affected area two (2) times a day. * ondansetron 4 mg disintegrating tablet Commonly known as:  ZOFRAN ODT Take 1 Tab by mouth every eight (8) hours as needed for Nausea. * ondansetron 8 mg disintegrating tablet Commonly known as:  ZOFRAN ODT Take 1 Tab by mouth every eight (8) hours as needed for Nausea. promethazine 25 mg tablet Commonly known as:  PHENERGAN Take 1 Tab by mouth every six (6) hours as needed for Nausea. * Notice: This list has 2 medication(s) that are the same as other medications prescribed for you. Read the directions carefully, and ask your doctor or other care provider to review them with you. Patient Instructions Learning About When to Call Your Doctor During Pregnancy (Up to 20 Weeks) Your Care Instructions It's common to have concerns about what might be a problem during pregnancy. Although most pregnant women don't have any serious problems, it's important to know when to call your doctor if you have certain symptoms. These are general suggestions.  Your doctor may give you some more information about when to call. When to call your doctor (up to 20 weeks) Call 911 anytime you think you may need emergency care. For example, call if: 
· You passed out (lost consciousness). Call your doctor now or seek immediate medical care if: 
· You have a fever. · You have vaginal bleeding. · You are dizzy or lightheaded, or you feel like you may faint. · You have symptoms of a urinary tract infection. These may include: 
¨ Pain or burning when you urinate. ¨ A frequent need to urinate without being able to pass much urine. ¨ Pain in the flank, which is just below the rib cage and above the waist on either side of the back. ¨ Blood in your urine. · You have belly pain. · You think you are having contractions. · You have a sudden release of fluid from your vagina. Watch closely for changes in your health, and be sure to contact your doctor if: 
· You have vaginal discharge that smells bad. · You have other concerns about your pregnancy. Follow-up care is a key part of your treatment and safety. Be sure to make and go to all appointments, and call your doctor if you are having problems. It's also a good idea to know your test results and keep a list of the medicines you take. Where can you learn more? Go to http://remi-kannan.info/. Enter J328 in the search box to learn more about \"Learning About When to Call Your Doctor During Pregnancy (Up to 20 Weeks). \" 
Current as of: March 16, 2017 Content Version: 11.4 © 1607-8131 Healthwise, Incorporated. Care instructions adapted under license by [employer] (which disclaims liability or warranty for this information). If you have questions about a medical condition or this instruction, always ask your healthcare professional. [place] [address] any warranty or liability for your use of this information. Introducing [facility]! Dear Karina Shields: Thank you for requesting a [employer] account. Our records indicate that you already have an active [employer] account. You can access your account anytime at [email or web address] Did you know that you can access your hospital and ER discharge instructions at any time in [employer]? You can also review all of your test results from your hospital stay or ER visit. Additional Information If you have questions, please visit the Frequently Asked Questions section of the [employer] website at [email or web address] Remember, [employer] is NOT to be used for urgent needs. For medical emergencies, dial 911. Now available from your iPhone and Android! Please provide this summary of care documentation to your next provider. Your primary care clinician is listed as Sadi Magaña. If you have any questions after today's visit, please call [phone].

## 2018-03-29 NOTE — PROGRESS NOTES
[facility]  [email or web address]  [phone]    Rafael Molina MD, FACOG       OB/GYN: OB Problem visit    Chief Complaint:   Chief Complaint   Patient presents with    Pregnancy Problem       Patient Active Problem List    Diagnosis    Encounter for supervision of other normal pregnancy, unspecified trimester     Declined genetic testing      Scoliosis     mild      Normal labor and delivery    GERD (gastroesophageal reflux disease)    Migraine    Eczema    Contusion of left knee    Acne       History of Present Illness: The patient is a 33 y.o.  female who reports having nausea and vomiting. Seen in ED 3/24/18. Believes may have been virus. Temp at ED was 98.3 Symptoms resolved  evening. Using 1/2 zofran BID to control symptoms. This is a new problem. This is not a routinely scheduled OB appointment. She reports the symptoms are has moderately improved. Aggravating factors include none. Alleviating factors include none. She does not have other concerns. [address] [facility]:  Past Medical History:   Diagnosis Date    Acne     Contusion of left knee 2013    Eczema 2013    GERD (gastroesophageal reflux disease) 2015    Migraine 2013    Right knee meniscal tear     resolved w physical therapy. BBall injury    Right wrist fracture , 2000    x2    Scoliosis     mild    Screening for cervical cancer 2010, 3/8/2012    normal. Dr. Aishwarya Mcintosh     No past surgical history on file.   Family History   Problem Relation Age of Onset    Hypertension Mother     Hypertension Father     High Cholesterol Father     Asthma Sister     Cancer Maternal Grandmother      colon    Diabetes Paternal Grandfather      type 2    Heart Attack Paternal Grandfather      fatal     Social History   Substance Use Topics    Smoking status: Never Smoker    Smokeless tobacco: Never Used    Alcohol use 0.0 oz/week     1 - 2 Glasses of wine per week Allergies   Allergen Reactions    Bactrim [Sulfamethoprim] Hives    Ceftin [Cefuroxime Axetil] Hives    Gardasil [H Papillomavirus Vac,Qval (Pf)] Other (comments)     syncope     Current Outpatient Prescriptions   Medication Sig    ondansetron (ZOFRAN ODT) 8 mg disintegrating tablet Take 1 Tab by mouth every eight (8) hours as needed for Nausea. No current facility-administered medications for this visit. Review of Systems:  History obtained from the patient and written ROS questionnaire  Constitutional: negative for fevers, chills and weight loss  ENT ROS: negative for - hearing change, oral lesions or visual changes  Respiratory: negative for cough, wheezing or dyspnea on exertion  Cardiovascular: negative for chest pain, irregular heart beats, exertional chest pressure/discomfort  Gastrointestinal: negative for dysphagia, nausea and vomiting  Genito-Urinary ROS: no dysuria, trouble voiding, or hematuria, see HPI  Inteument/breast: negative for rash, breast lump and nipple discharge  Musculoskeletal:negative for stiff joints, neck pain and muscle weakness  Endocrine ROS: negative for - breast changes, galactorrhea or temperature intolerance  Hematological and Lymphatic ROS: negative for - blood clots, bruising or swollen lymph nodes    Physical Exam:  Visit Vitals    /70    Ht 5' 7\" (1.702 m)    Wt 145 lb 3.2 oz (65.9 kg)    BMI 22.74 kg/m2       GENERAL: alert, well appearing, and in no distress  HEAD; normocephalic, atraumatic    ABDOMEN: soft, nontender, nondistended, no masses or organomegaly   BACK: normal range of motion, no tenderness, no CVAT   NEURO: alert, oriented, normal speech    See PN flowsheet for additional notes and exam    Assessment:  33 y.o.  13w4d   Encounter Diagnoses   Name Primary?     Encounter for supervision of other normal pregnancy, unspecified trimester Yes    Nausea and vomiting in pregnancy        Plan:  An evaluation of this patient's concern is planned. The patient is advised that she should contact the office if she does not note improvement or if symptoms recur  She should contact our office with any questions or concerns  She could keep her routine OB appointment. RF zofran  N/v prec  Disc option of AFP: pt considering  Orders Placed This Encounter    ondansetron (ZOFRAN ODT) 8 mg disintegrating tablet       No results found for this visit on 03/29/18.     Ten Easley MD

## 2018-03-29 NOTE — PATIENT INSTRUCTIONS
Learning About When to Call Your Doctor During Pregnancy (Up to 20 Weeks)  Your Care Instructions    It's common to have concerns about what might be a problem during pregnancy. Although most pregnant women don't have any serious problems, it's important to know when to call your doctor if you have certain symptoms. These are general suggestions. Your doctor may give you some more information about when to call. When to call your doctor (up to 20 weeks)  Call 911 anytime you think you may need emergency care. For example, call if:  · You passed out (lost consciousness). Call your doctor now or seek immediate medical care if:  · You have a fever. · You have vaginal bleeding. · You are dizzy or lightheaded, or you feel like you may faint. · You have symptoms of a urinary tract infection. These may include:  ¨ Pain or burning when you urinate. ¨ A frequent need to urinate without being able to pass much urine. ¨ Pain in the flank, which is just below the rib cage and above the waist on either side of the back. ¨ Blood in your urine. · You have belly pain. · You think you are having contractions. · You have a sudden release of fluid from your vagina. Watch closely for changes in your health, and be sure to contact your doctor if:  · You have vaginal discharge that smells bad. · You have other concerns about your pregnancy. Follow-up care is a key part of your treatment and safety. Be sure to make and go to all appointments, and call your doctor if you are having problems. It's also a good idea to know your test results and keep a list of the medicines you take. Where can you learn more? Go to http://remi-kannan.info/. Enter H772 in the search box to learn more about \"Learning About When to Call Your Doctor During Pregnancy (Up to 20 Weeks). \"  Current as of: March 16, 2017  Content Version: 11.4  © 2874-2799 Healthwise, Incorporated.  Care instructions adapted under license by [employer] (which disclaims liability or warranty for this information). If you have questions about a medical condition or this instruction, always ask your healthcare professional. [address] any warranty or liability for your use of this information.

## 2018-04-18 ENCOUNTER — ROUTINE PRENATAL (OUTPATIENT)
Dept: OBGYN CLINIC | Age: 33
End: 2018-04-18

## 2018-04-18 VITALS
HEIGHT: 67 IN | DIASTOLIC BLOOD PRESSURE: 72 MMHG | WEIGHT: 149 LBS | BODY MASS INDEX: 23.39 KG/M2 | SYSTOLIC BLOOD PRESSURE: 120 MMHG

## 2018-04-18 DIAGNOSIS — Z34.80 ENCOUNTER FOR SUPERVISION OF OTHER NORMAL PREGNANCY, UNSPECIFIED TRIMESTER: Primary | ICD-10-CM

## 2018-04-18 RX ORDER — LORATADINE 10 MG/1
10 TABLET ORAL
COMMUNITY
End: 2018-07-25

## 2018-04-18 NOTE — PROGRESS NOTES
[facility]  [email or web address]  [phone]    Cecy Gomez MD, FACOG     Follow-up OB visit    Chief Complaint   Patient presents with    Routine Prenatal Visit       Vitals:    18 1555   BP: 120/72   Weight: 149 lb (67.6 kg)   Height: 5' 7\" (1.702 m)       Patient Active Problem List    Diagnosis Date Noted    Encounter for supervision of other normal pregnancy, unspecified trimester 2018    Scoliosis     Normal labor and delivery 2015    GERD (gastroesophageal reflux disease) 2015    Migraine 2013    Eczema 2013    Contusion of left knee 2013    Acne        The patient reports the following concerns: pt states her nausea has improved. See PN flowsheet for exam    33 y.o.  16w3d   Encounter Diagnosis   Name Primary?  Encounter for supervision of other normal pregnancy, unspecified trimester Yes     Disc safer meds in pregnancy: skin, gerd, nausea, allergies  decl TS/.genetic screening     [] SAB/bleeding precautions reviewed   [] PTL/PPROM precautions reviewed   [] Labor precautions reviewed   [] Fetal kick counts discussed   [] Labs reviewed with patient   [] [place] precautions reviewed   [] Consent reviewed   [] Handouts given to pt   [] Glucola handout    [] GBS/labor/Magic Hour handout   []    []    []    []    Follow-up Disposition:  Return in about 4 weeks (around 2018) for Follow up OB visit. No orders of the defined types were placed in this encounter.       eCcy Gomez MD

## 2018-04-18 NOTE — MR AVS SNAPSHOT
[address] [facility] [address] [place] [address] 
[phone] Patient: Phillip Washington MRN: [id number] Visit Information Date & Time Provider Department Dept. Phone Encounter #  
 4/18/2018  3:50 PM MD Brodie Howell [phone] [id number] Your Appointments 5/23/2018  1:00 PM  
ULTRASOUND with ULTRASOUND1GERARDO ([facility]) Appt Note: 4wk fob with 20wk u/s   TP; .  
 [address] [facility] [address] [place] [address]  
[phone]  
  
   
 [address] [address]  
  
    
  
 5/23/2018  1:30 PM  
OB VISIT with MD Brodie Howell ([facility]) Appt Note: . [address] [facility] [address] [place] [address]  
[phone]  
  
   
 [id number] [place] [address]  
  
    
 6/27/2018  1:00 PM  
OB VISIT with MD Brodie Howell ([facility]) Appt Note: 4wk fob 24wk   TP  
 [address] [facility] [address] [place] [address]  
[phone] Upcoming Health Maintenance Date Due  
 PAP AKA CERVICAL CYTOLOGY 2/21/2021 Allergies as of 4/18/2018  Review Complete On: 4/18/2018 By: Florencio Townsend LPN Severity Noted Reaction Type Reactions Bactrim [Sulfamethoprim]  03/24/2018    Hives Ceftin [Cefuroxime Axetil]  11/30/2010    Hives Gardasil [H Papillomavirus Vac,qval (Pf)]  09/07/2012    Other (comments)  
 syncope Current Immunizations  Reviewed on 10/16/2017 Name Date Hep A Vaccine (Adult) 1/30/2014 Human Papillomavirus 1/1/2006 Influenza Vaccine 8/19/2017, 9/16/2014, 8/24/2013 TDAP Vaccine 9/7/2012 Tdap 4/16/2015 Not reviewed this visit Vitals BP Height(growth percentile) Weight(growth percentile) LMP BMI OB Status 120/72 5' 7\" (1.702 m) 149 lb (67.6 kg) 12/24/2017 (Exact Date) 23.34 kg/m2 Pregnant Smoking Status Never Smoker BMI and BSA Data Body Mass Index Body Surface Area  
 23.34 kg/m 2 1.79 m 2 Preferred Pharmacy Pharmacy Name Phone [facility] [address]  [facility] [phone] Your Updated Medication List  
  
   
This list is accurate as of 4/18/18  3:59 PM.  Always use your most recent med list.  
  
  
  
  
 CLARITIN 10 mg tablet Generic drug:  loratadine Take 10 mg by mouth. ondansetron 8 mg disintegrating tablet Commonly known as:  ZOFRAN ODT Take 1 Tab by mouth every eight (8) hours as needed for Nausea. Patient Instructions Weeks 14 to 18 of Your Pregnancy: Care Instructions Your Care Instructions During this time, you may start to \"show,\" so that you look pregnant to people around you. You may also notice some changes in your skin, such as itchy spots on your palms or acne on your face. Your baby is now able to pass urine, and your baby's first stool (meconium) is starting to collect in his or her intestines. Hair is also beginning to grow on your baby's head. At your next visit, between weeks 18 and 20, your doctor may do an ultrasound test. The test allows your doctor to check for certain problems. Your doctor can also tell the sex of your baby. This is a good time to think about whether you want to know whether your baby is a boy or a girl. Talk to your doctor about getting a flu shot to help keep you healthy during your pregnancy. As your pregnancy moves along, it is common to worry or feel anxious. Your body is changing a lot. And you are thinking about giving birth, the health of your baby, and becoming a parent. You can learn to cope with any anxiety and stress you feel. Follow-up care is a key part of your treatment and safety. Be sure to make and go to all appointments, and call your doctor if you are having problems. It's also a good idea to know your test results and keep a list of the medicines you take. How can you care for yourself at home? ?Reduce stress ? · Ask for help with cooking and housekeeping. ? · Figure out who or what causes your stress. Avoid these people or situations as much as possible. ? · Relax every day. Taking 10- to 15-minute breaks can make a big difference. Take a walk, listen to music, or take a warm bath. ? · Learn relaxation techniques at prenatal or yoga class. Or buy a relaxation tape. ? · List your fears about having a baby and becoming a parent. Share the list with someone you trust. Decide which worries are really small, and try to let them go. Exercise ? · If you did not exercise much before pregnancy, start slowly. Walking is best. Lory Capes yourself, and do a little more every day. ? · Brisk walking, easy jogging, low-impact aerobics, water aerobics, and yoga are good choices. Some sports, such as scuba diving, horseback riding, downhill skiing, gymnastics, and water skiing, are not a good idea. ? · Try to do at least 2½ hours a week of moderate exercise, such as a fast walk. One way to do this is to be active 30 minutes a day, at least 5 days a week. It's fine to be active in blocks of 10 minutes or more throughout your day and week. ? · Wear loose clothing. And wear shoes and a bra that provide good support. ? · Warm up and cool down to start and finish your exercise. ? · If you want to use weights, be sure to use light weights. They reduce stress on your joints. ?Stay at the best weight for you ? · Experts recommend that you gain about 1 pound a month during the first 3 months of your pregnancy.   
? · Experts recommend that you gain about 1 pound a week during your last 6 months of pregnancy, for a total weight gain of 25 to 35 pounds. ? · If you are underweight, you will need to gain more weight (about 28 to 40 pounds). ? · If you are overweight, you may not need to gain as much weight (about 15 to 25 pounds). ? · If you are gaining weight too fast, use common sense. Exercise every day, and limit sweets, fast foods, and fats. Choose lean meats, fruits, and vegetables. ? · If you are having twins or more, your doctor may refer you to a dietitian. Where can you learn more? Go to http://remi-kannan.info/. Enter U097 in the search box to learn more about \"Weeks 14 to 18 of Your Pregnancy: Care Instructions. \" Current as of: March 16, 2017 Content Version: 11.4 © 0030-1678 ezCater. Care instructions adapted under license by OpenRoute (which disclaims liability or warranty for this information). If you have questions about a medical condition or this instruction, always ask your healthcare professional. [address] any warranty or liability for your use of this information. Introducing [facility] & HEALTH SERVICES! Dear Elizabet Altamirano: Thank you for requesting a Figgu account. Our records indicate that you already have an active Figgu account. You can access your account anytime at [email or web address] Did you know that you can access your hospital and ER discharge instructions at any time in Figgu? You can also review all of your test results from your hospital stay or ER visit. Additional Information If you have questions, please visit the Frequently Asked Questions section of the Figgu website at [email or web address] Remember, Figgu is NOT to be used for urgent needs. For medical emergencies, dial 911. Now available from your iPhone and Android! Please provide this summary of care documentation to your next provider. Your primary care clinician is listed as Torres Escalera. If you have any questions after today's visit, please call [phone].

## 2018-04-18 NOTE — PATIENT INSTRUCTIONS
Weeks 14 to 18 of Your Pregnancy: Care Instructions  Your Care Instructions    During this time, you may start to \"show,\" so that you look pregnant to people around you. You may also notice some changes in your skin, such as itchy spots on your palms or acne on your face. Your baby is now able to pass urine, and your baby's first stool (meconium) is starting to collect in his or her intestines. Hair is also beginning to grow on your baby's head. At your next visit, between weeks 18 and 20, your doctor may do an ultrasound test. The test allows your doctor to check for certain problems. Your doctor can also tell the sex of your baby. This is a good time to think about whether you want to know whether your baby is a boy or a girl. Talk to your doctor about getting a flu shot to help keep you healthy during your pregnancy. As your pregnancy moves along, it is common to worry or feel anxious. Your body is changing a lot. And you are thinking about giving birth, the health of your baby, and becoming a parent. You can learn to cope with any anxiety and stress you feel. Follow-up care is a key part of your treatment and safety. Be sure to make and go to all appointments, and call your doctor if you are having problems. It's also a good idea to know your test results and keep a list of the medicines you take. How can you care for yourself at home? ?Reduce stress  ? · Ask for help with cooking and housekeeping. ? · Figure out who or what causes your stress. Avoid these people or situations as much as possible. ? · Relax every day. Taking 10- to 15-minute breaks can make a big difference. Take a walk, listen to music, or take a warm bath. ? · Learn relaxation techniques at prenatal or yoga class. Or buy a relaxation tape. ? · List your fears about having a baby and becoming a parent. Share the list with someone you trust. Decide which worries are really small, and try to let them go. Exercise  ?  · If you did not exercise much before pregnancy, start slowly. Walking is best. Tomeka Calico yourself, and do a little more every day. ? · Brisk walking, easy jogging, low-impact aerobics, water aerobics, and yoga are good choices. Some sports, such as scuba diving, horseback riding, downhill skiing, gymnastics, and water skiing, are not a good idea. ? · Try to do at least 2½ hours a week of moderate exercise, such as a fast walk. One way to do this is to be active 30 minutes a day, at least 5 days a week. It's fine to be active in blocks of 10 minutes or more throughout your day and week. ? · Wear loose clothing. And wear shoes and a bra that provide good support. ? · Warm up and cool down to start and finish your exercise. ? · If you want to use weights, be sure to use light weights. They reduce stress on your joints. ?Stay at the best weight for you  ? · Experts recommend that you gain about 1 pound a month during the first 3 months of your pregnancy. ? · Experts recommend that you gain about 1 pound a week during your last 6 months of pregnancy, for a total weight gain of 25 to 35 pounds. ? · If you are underweight, you will need to gain more weight (about 28 to 40 pounds). ? · If you are overweight, you may not need to gain as much weight (about 15 to 25 pounds). ? · If you are gaining weight too fast, use common sense. Exercise every day, and limit sweets, fast foods, and fats. Choose lean meats, fruits, and vegetables. ? · If you are having twins or more, your doctor may refer you to a dietitian. Where can you learn more? Go to http://remi-kannan.info/. Enter S973 in the search box to learn more about \"Weeks 14 to 18 of Your Pregnancy: Care Instructions. \"  Current as of: March 16, 2017  Content Version: 11.4  © 1184-9991 SoZo Global. Care instructions adapted under license by [employer] (which disclaims liability or warranty for this information).  If you have questions about a medical condition or this instruction, always ask your healthcare professional. [place] [address] any warranty or liability for your use of this information.

## 2018-04-25 ENCOUNTER — TELEPHONE (OUTPATIENT)
Dept: OBGYN CLINIC | Age: 33
End: 2018-04-25

## 2018-04-25 NOTE — TELEPHONE ENCOUNTER
[facility] calling stating [facility]. She was given Zithromax and flonase. 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 Patient wants to know if she can use rhinocort. Please advise.

## 2018-04-25 NOTE — TELEPHONE ENCOUNTER
Patient calling stating that she believes she has a sinus infection. She has dark green colored nasal drainage and coughing. She denies fevers, but has been having problems with sinuses for over a month. Patient denies bleeding, denies ROM, denies contractions. Patient encouraged to see her PCP since her sx could be consistent with a sinus infection. Patient verbalized understanding.

## 2018-04-25 NOTE — TELEPHONE ENCOUNTER
Message left at 8:40am      33year old patient  17w3d pregnant patient last in the office on 18. Patient left message regarding having problems with nausea and vomiting. Patient states she thinks it may be related to sinus infection . She is coughing up dark mucous.         This nurse attempted to reach the patient, to get more details and had to leave a message to call the office back

## 2018-05-23 ENCOUNTER — TELEPHONE (OUTPATIENT)
Dept: OBGYN CLINIC | Age: 33
End: 2018-05-23

## 2018-05-23 ENCOUNTER — ROUTINE PRENATAL (OUTPATIENT)
Dept: OBGYN CLINIC | Age: 33
End: 2018-05-23

## 2018-05-23 VITALS — SYSTOLIC BLOOD PRESSURE: 118 MMHG | WEIGHT: 158.2 LBS | DIASTOLIC BLOOD PRESSURE: 70 MMHG | BODY MASS INDEX: 24.78 KG/M2

## 2018-05-23 DIAGNOSIS — O44.02 PLACENTA PREVIA ANTEPARTUM IN SECOND TRIMESTER: ICD-10-CM

## 2018-05-23 DIAGNOSIS — O44.02 PLACENTA PREVIA IN SECOND TRIMESTER: ICD-10-CM

## 2018-05-23 DIAGNOSIS — Z34.80 ENCOUNTER FOR SUPERVISION OF OTHER NORMAL PREGNANCY, UNSPECIFIED TRIMESTER: Primary | ICD-10-CM

## 2018-05-23 RX ORDER — RANITIDINE HCL 75 MG
75 TABLET ORAL 2 TIMES DAILY
COMMUNITY
End: 2018-09-19

## 2018-05-23 NOTE — MR AVS SNAPSHOT
[address] [place] [address] 
[phone] Patient: Suman Garnett MRN: [id number] Visit Information Date & Time Provider Department Dept. Phone Encounter #  
 5/23/2018  1:30 PM MD Brodie Vizcaino [id number] [id number] Your Appointments 7/25/2018  3:00 PM  
ESTABLISHED PATIENT with MD Brodie Vizcaino ([facility]) Appt Note: 28 wk w/ glucose [address] [place] [address]  
[phone]  
  
   
 [address] [address]  
  
    
  
 6/27/2018  1:00 PM  
OB VISIT with MD Brodie Vizcaino ([facility]) Appt Note: 4wk fob 24wk   TP  
 [address] [facility] [address]  
[facility] 31 [address] Upcoming Health Maintenance Date Due Influenza Age 5 to Adult 8/1/2018 PAP AKA CERVICAL CYTOLOGY 2/21/2021 Allergies as of 5/23/2018  Review Complete On: 4/18/2018 By: Lacy Mcdowell MD  
  
 Severity Noted Reaction Type Reactions Bactrim [Sulfamethoprim]  03/24/2018    Hives Ceftin [Cefuroxime Axetil]  11/30/2010    Hives Gardasil [H Papillomavirus Vac,qval (Pf)]  09/07/2012    Other (comments)  
 syncope Current Immunizations  Reviewed on 10/16/2017 Name Date Hep A Vaccine (Adult) 1/30/2014 Human Papillomavirus 1/1/2006 Influenza Vaccine 8/19/2017, 9/16/2014, 8/24/2013 TDAP Vaccine 9/7/2012 Tdap 4/16/2015 Not reviewed this visit Vitals Weight(growth percentile) LMP BMI OB Status Smoking Status 158 lb 3.2 oz (71.8 kg) 12/24/2017 (Exact Date) 24.78 kg/m2 Pregnant Never Smoker BMI and BSA Data Body Mass Index Body Surface Area 24.78 kg/m 2 1.84 m 2 Preferred Pharmacy Pharmacy Name Phone [facility] [address]  [facility] [phone] Your Updated Medication List  
  
   
This list is accurate as of 5/23/18  1:54 PM.  Always use your most recent med list.  
  
  
  
  
 CLARITIN 10 mg tablet Generic drug:  loratadine Take 10 mg by mouth. ondansetron 8 mg disintegrating tablet Commonly known as:  ZOFRAN ODT Take 1 Tab by mouth every eight (8) hours as needed for Nausea. Patient Instructions Weeks 22 to 26 of Your Pregnancy: Care Instructions Your Care Instructions As you enter your 7th month of pregnancy at week 26, your baby's lungs are growing stronger and getting ready to breathe. You may notice that your baby responds to the sound of your or your partner's voice. You may also notice that your baby does less turning and twisting and more squirming or jerking. Jerking often means that your baby has the hiccups. Hiccups are perfectly normal and are only temporary. You may want to think about attending a childbirth preparation class. This is also a good time to start thinking about whether you want to have pain medicine during labor. Most pregnant women are tested for gestational diabetes between weeks 25 and 28. Gestational diabetes occurs when your blood sugar level gets too high when you're pregnant. The test is important, because you can have gestational diabetes and not know it. But the condition can cause problems for your baby. Follow-up care is a key part of your treatment and safety. Be sure to make and go to all appointments, and call your doctor if you are having problems. It's also a good idea to know your test results and keep a list of the medicines you take. How can you care for yourself at home? Ease discomfort from your baby's kicking · Change your position. Sometimes this will cause your baby to change position too. · Take a deep breath while you raise your arm over your head. Then breathe out while you drop your arm. Do Kegel exercises to prevent urine from leaking · You can do Kegel exercises while you stand or sit. ¨ Squeeze the same muscles you would use to stop your urine. Your belly and thighs should not move. ¨ Hold the squeeze for 3 seconds, and then relax for 3 seconds. ¨ Start with 3 seconds. Then add 1 second each week until you are able to squeeze for 10 seconds. ¨ Repeat the exercise 10 to 15 times for each session. Do three or more sessions each day. Ease or reduce swelling in your feet, ankles, hands, and fingers · If your fingers are puffy, take off your rings. · Do not eat high-salt foods, such as potato chips. · Prop up your feet on a stool or couch as much as possible. Sleep with pillows under your feet. · Do not stand for long periods of time or wear tight shoes. · Wear support stockings. Where can you learn more? Go to http://remi-kannan.info/. Enter G264 in the search box to learn more about \"Weeks 22 to 26 of Your Pregnancy: Care Instructions. \" Current as of: March 16, 2017 Content Version: 11.4 © 9618-6051 WyzeTalk. Care instructions adapted under license by High-Tech Bridge (which disclaims liability or warranty for this information). If you have questions about a medical condition or this instruction, always ask your healthcare professional. [place] [address] any warranty or liability for your use of this information. Introducing [facility] & HEALTH SERVICES! Dear Massimo Tiwari: Thank you for requesting a Memoright account. Our records indicate that you already have an active Memoright account. You can access your account anytime at [email or web address] Did you know that you can access your hospital and ER discharge instructions at any time in Memoright?   You can also review all of your test results from your hospital stay or ER visit. Additional Information If you have questions, please visit the Frequently Asked Questions section of the The Invisible Armor website at [email or web address] Remember, The Invisible Armor is NOT to be used for urgent needs. For medical emergencies, dial 911. Now available from your iPhone and Android! Please provide this summary of care documentation to your next provider. Your primary care clinician is listed as Brandon Arthur. If you have any questions after today's visit, please call [phone].

## 2018-05-23 NOTE — PROGRESS NOTES
[facility]  [email or web address]  [phone]    Sybil Rose MD, FACOG       [facility]   FOLLOW UP OB NOTE WITH ULTRASOUND    Chief Complaint   Patient presents with    Routine Prenatal Visit     Vitals:    18 1353   BP: 118/70   Weight: 158 lb 3.2 oz (71.8 kg)       The patient reports the following concerns: none  See PN flowsheet for exam    33 y.o.  21w3d   Encounter Diagnoses   Name Primary?  Encounter for supervision of other normal pregnancy, unspecified trimester Yes    Placenta previa in second trimester     Placenta previa antepartum in second trimester      Bleeding prec  Disc risks of previa in preg including bleeding triggers  Travel prec, at your own risk in 4-5 weeks, may look at [facility] instead of [address] [place] [address] in case of complication  MFM for fu imaging     I discussed with the patient the limitations of ultrasound and that imaging can not rule out all birth defects, chromosomal problems, or other problems with the baby. [] SAB/bleeding precautions reviewed   [] PTL/PPROM precautions reviewed   [] Labor precautions reviewed   [] Fetal kick counts discussed   [] Labs reviewed with patient   []     I discussed with the patient the limitations of ultrasound and that imaging can not rule out all birth defects, chromosomal problems, or other problems with the baby. Follow-up Disposition: Not on File    No orders of the defined types were placed in this encounter. Sybil Rose MD        Physician review of ultrasound performed by technician    Today's ultrasound report and images were reviewed and discussed with the patient. Please see images and imaging report entered by technician in PACS for more detail and progress note and diagnosis entered by MD.    Demetris Mansfield MD      FETAL SURVEY  A SINGLE VIABLE IUP AT 21W3D GA BY LMP. FETAL CARDIAC MOTION OBSERVED.   FETAL ANATOMY WELL VISUALIZED AND APPEARS WITHIN NORMAL LIMITS. NO ABNORMALITIES IDENTIFIED ON TODAYS EXAM.  APPROPRIATE GROWTH MEASURED; SIZE = DATES. YOLY AND CERVIX APPEAR WITHIN NORMAL LIMITS. THERE APPEARS TO BE POSTERIOR PLACENTA PREVIA. GENDER: XX, PATIENT DOES NOT KNOW.

## 2018-05-23 NOTE — PATIENT INSTRUCTIONS
Weeks 22 to 26 of Your Pregnancy: Care Instructions  Your Care Instructions    As you enter your 7th month of pregnancy at week 26, your baby's lungs are growing stronger and getting ready to breathe. You may notice that your baby responds to the sound of your or your partner's voice. You may also notice that your baby does less turning and twisting and more squirming or jerking. Jerking often means that your baby has the hiccups. Hiccups are perfectly normal and are only temporary. You may want to think about attending a childbirth preparation class. This is also a good time to start thinking about whether you want to have pain medicine during labor. Most pregnant women are tested for gestational diabetes between weeks 25 and 28. Gestational diabetes occurs when your blood sugar level gets too high when you're pregnant. The test is important, because you can have gestational diabetes and not know it. But the condition can cause problems for your baby. Follow-up care is a key part of your treatment and safety. Be sure to make and go to all appointments, and call your doctor if you are having problems. It's also a good idea to know your test results and keep a list of the medicines you take. How can you care for yourself at home? Ease discomfort from your baby's kicking  · Change your position. Sometimes this will cause your baby to change position too. · Take a deep breath while you raise your arm over your head. Then breathe out while you drop your arm. Do Kegel exercises to prevent urine from leaking  · You can do Kegel exercises while you stand or sit. ¨ Squeeze the same muscles you would use to stop your urine. Your belly and thighs should not move. ¨ Hold the squeeze for 3 seconds, and then relax for 3 seconds. ¨ Start with 3 seconds. Then add 1 second each week until you are able to squeeze for 10 seconds. ¨ Repeat the exercise 10 to 15 times for each session.  Do three or more sessions each day.  Ease or reduce swelling in your feet, ankles, hands, and fingers  · If your fingers are puffy, take off your rings. · Do not eat high-salt foods, such as potato chips. · Prop up your feet on a stool or couch as much as possible. Sleep with pillows under your feet. · Do not stand for long periods of time or wear tight shoes. · Wear support stockings. Where can you learn more? Go to http://remi-kannan.info/. Enter G264 in the search box to learn more about \"Weeks 22 to 26 of Your Pregnancy: Care Instructions. \"  Current as of: March 16, 2017  Content Version: 11.4  © 5096-6279 Healthwise, [employer]. Care instructions adapted under license by [employer] (which disclaims liability or warranty for this information). If you have questions about a medical condition or this instruction, always ask your healthcare professional. [place] [address] any warranty or liability for your use of this information.

## 2018-06-26 ENCOUNTER — TELEPHONE (OUTPATIENT)
Dept: INTERNAL MEDICINE CLINIC | Age: 33
End: 2018-06-26

## 2018-06-26 NOTE — TELEPHONE ENCOUNTER
----- Message from Jane Pelaez sent at 2018  3:39 PM EDT -----  Regarding: /Telephone  Pt will need a referral to see  Specialist     Chapin Navarrete (u) [phone] (P) [phone]    Pt has an appointment on 18 at 9:30 am    Best contact for the pt is [phone]

## 2018-06-27 ENCOUNTER — ROUTINE PRENATAL (OUTPATIENT)
Dept: OBGYN CLINIC | Age: 33
End: 2018-06-27

## 2018-06-27 VITALS
HEIGHT: 67 IN | DIASTOLIC BLOOD PRESSURE: 82 MMHG | BODY MASS INDEX: 26.06 KG/M2 | WEIGHT: 166 LBS | SYSTOLIC BLOOD PRESSURE: 120 MMHG

## 2018-06-27 DIAGNOSIS — G43.809 OTHER MIGRAINE WITHOUT STATUS MIGRAINOSUS, NOT INTRACTABLE: ICD-10-CM

## 2018-06-27 DIAGNOSIS — Z34.80 ENCOUNTER FOR SUPERVISION OF OTHER NORMAL PREGNANCY, UNSPECIFIED TRIMESTER: Primary | ICD-10-CM

## 2018-06-27 RX ORDER — BUTALBITAL, ACETAMINOPHEN AND CAFFEINE 50; 325; 40 MG/1; MG/1; MG/1
1 TABLET ORAL
Qty: 30 TAB | Refills: 0 | Status: SHIPPED | OUTPATIENT
Start: 2018-06-27 | End: 2020-01-13

## 2018-06-27 RX ORDER — BENZOCAINE .13; .15; .5; 2 G/100G; G/100G; G/100G; G/100G
GEL ORAL
COMMUNITY
End: 2018-07-25

## 2018-06-27 RX ORDER — ONDANSETRON 8 MG/1
8 TABLET, ORALLY DISINTEGRATING ORAL
Qty: 30 TAB | Refills: 2 | Status: SHIPPED | OUTPATIENT
Start: 2018-06-27 | End: 2018-07-25

## 2018-06-27 NOTE — PATIENT INSTRUCTIONS
Weeks 26 to 30 of Your Pregnancy: Care Instructions  Your Care Instructions    You are now in your last trimester of pregnancy. Your baby is growing rapidly. And you'll probably feel your baby moving around more often. Your doctor may ask you to count your baby's kicks. Your back may ache as your body gets used to your baby's size and length. If you haven't already had the Tdap shot during this pregnancy, talk to your doctor about getting it. It will help protect your  against pertussis infection. During this time, it's important to take care of yourself and pay attention to what your body needs. If you feel sexual, explore ways to be close with your partner that match your comfort and desire. Use the tips provided in this care sheet to find ways to be sexual in your own way. Follow-up care is a key part of your treatment and safety. Be sure to make and go to all appointments, and call your doctor if you are having problems. It's also a good idea to know your test results and keep a list of the medicines you take. How can you care for yourself at home? Take it easy at work  · Take frequent breaks. If possible, stop working when you are tired, and rest during your lunch hour. · Take bathroom breaks every 2 hours. · Change positions often. If you sit for long periods, stand up and walk around. · When you stand for a long time, keep one foot on a low stool with your knee bent. After standing a lot, sit with your feet up. · Avoid fumes, chemicals, and tobacco smoke. Be sexual in your own way  · Having sex during pregnancy is okay, unless your doctor tells you not to. · You may be very interested in sex, or you may have no interest at all. · Your growing belly can make it hard to find a good position during intercourse. [place] and explore. · You may get cramps in your uterus when your partner touches your breasts.   · A back rub may relieve the backache or cramps that sometimes follow orgasm. Learn about  labor  · Watch for signs of  labor. You may be going into labor if:  ¨ You have menstrual-like cramps, with or without nausea. ¨ You have about 4 or more contractions in 20 minutes, or about 8 or more within 1 hour, even after you have had a glass of water and are resting. ¨ You have a low, dull backache that does not go away when you change your position. ¨ You have pain or pressure in your pelvis that comes and goes in a pattern. ¨ You have intestinal cramping or flu-like symptoms, with or without diarrhea. ¨ You notice an increase or change in your vaginal discharge. Discharge may be heavy, mucus-like, watery, or streaked with blood. ¨ Your water breaks. · If you think you have  labor:  ¨ Drink 2 or 3 glasses of water or juice. Not drinking enough fluids can cause contractions. ¨ Stop what you are doing, and empty your bladder. Then lie down on your left side for at least 1 hour. ¨ While lying on your side, find your breast bone. Put your fingers in the soft spot just below it. Move your fingers down toward your belly button to find the top of your uterus. Check to see if it is tight. ¨ Contractions can be weak or strong. Record your contractions for an hour. Time a contraction from the start of one contraction to the start of the next one. ¨ Single or several strong contractions without a pattern are called Elpidio-Rios contractions. They are practice contractions but not the start of labor. They often stop if you change what you are doing. ¨ Call your doctor if you have regular contractions. Where can you learn more? Go to http://remi-kannan.info/. Enter P876 in the search box to learn more about \"Weeks 26 to 30 of Your Pregnancy: Care Instructions. \"  Current as of: 2017  Content Version: 11.4  © 2789-0599 [employer].  Care instructions adapted under license by [employer] (which disclaims liability or warranty for this information). If you have questions about a medical condition or this instruction, always ask your healthcare professional. [place] [address] any warranty or liability for your use of this information.

## 2018-06-27 NOTE — MR AVS SNAPSHOT
[address] [facility] [address] [place] [address] 
[phone] Patient: Daryl Mariscal MRN: [id number] WYQ:[birth year] Visit Information Date & Time Provider Department Dept. Phone Encounter #  
 6/27/2018  1:00 PM Joselyn Kussmaul, MD [place] [phone] [id number]  
  
 7/25/2018  3:00 PM  
OB VISIT with Joselyn Kussmaul, MD [place] [facility] Appt Note: 28wk w/ glucola, TP  
 [id number] [facility] [address] [place] [address] [phone]  
[facility] [address] Upcoming Health Maintenance Date Due Influenza Age 5 to Adult 8/1/2018 PAP AKA CERVICAL CYTOLOGY 2/21/2021 Allergies as of 6/27/2018  Review Complete On: 6/27/2018 By: Terrance Kenny LPN Severity Noted Reaction Type Reactions Bactrim [Sulfamethoprim]  03/24/2018    Hives Ceftin [Cefuroxime Axetil]  11/30/2010    Hives Gardasil [H Papillomavirus Vac,qval (Pf)]  09/07/2012    Other (comments)  
 syncope Current Immunizations  Reviewed on 10/16/2017 Name Date Hep A Vaccine (Adult) 1/30/2014 Human Papillomavirus 1/1/2006 Influenza Vaccine 8/19/2017, 9/16/2014, 8/24/2013 TDAP Vaccine 9/7/2012 Tdap 4/16/2015 Not reviewed this visit Vitals BP Height(growth percentile) Weight(growth percentile) LMP BMI OB Status 120/82 5' 7\" (1.702 m) 166 lb (75.3 kg) 12/24/2017 (Exact Date) 26 kg/m2 Pregnant Smoking Status Never Smoker BMI and BSA Data Body Mass Index Body Surface Area  
 26 kg/m 2 1.89 m 2 Preferred Pharmacy Pharmacy Name Phone [facility] [address]  [facility] [phone] Your Updated Medication List  
  
   
This list is accurate as of 6/27/18  1:02 PM.  Always use your most recent med list.  
  
  
  
  
 CLARITIN 10 mg tablet Generic drug:  loratadine Take 10 mg by mouth. ondansetron 8 mg disintegrating tablet Commonly known as:  ZOFRAN ODT Take 1 Tab by mouth every eight (8) hours as needed for Nausea. RHINOCORT ALLERGY 32 mcg/actuation nasal spray Generic drug:  budesonide ZANTAC 75 75 mg tablet Generic drug:  raNITIdine Take 75 mg by mouth two (2) times a day. To-Do List   
 2018 9:30 AM  
  Appointment with ULTRASOUND 3 [facility] at [facility] ([phone]) Patient Instructions Weeks 26 to 30 of Your Pregnancy: Care Instructions Your Care Instructions You are now in your last trimester of pregnancy. Your baby is growing rapidly. And you'll probably feel your baby moving around more often. Your doctor may ask you to count your baby's kicks. Your back may ache as your body gets used to your baby's size and length. If you haven't already had the Tdap shot during this pregnancy, talk to your doctor about getting it. It will help protect your  against pertussis infection. During this time, it's important to take care of yourself and pay attention to what your body needs. If you feel sexual, explore ways to be close with your partner that match your comfort and desire. Use the tips provided in this care sheet to find ways to be sexual in your own way. Follow-up care is a key part of your treatment and safety. Be sure to make and go to all appointments, and call your doctor if you are having problems. It's also a good idea to know your test results and keep a list of the medicines you take. How can you care for yourself at home? Take it easy at work · Take frequent breaks. If possible, stop working when you are tired, and rest during your lunch hour. · Take bathroom breaks every 2 hours. · Change positions often. If you sit for long periods, stand up and walk around. · When you stand for a long time, keep one foot on a low stool with your knee bent. After standing a lot, sit with your feet up. · Avoid fumes, chemicals, and tobacco smoke. Be sexual in your own way · Having sex during pregnancy is okay, unless your doctor tells you not to. · You may be very interested in sex, or you may have no interest at all. · Your growing belly can make it hard to find a good position during intercourse. [place] and explore. · You may get cramps in your uterus when your partner touches your breasts. · A back rub may relieve the backache or cramps that sometimes follow orgasm. Learn about  labor · Watch for signs of  labor. You may be going into labor if: 
¨ You have menstrual-like cramps, with or without nausea. ¨ You have about 4 or more contractions in 20 minutes, or about 8 or more within 1 hour, even after you have had a glass of water and are resting. ¨ You have a low, dull backache that does not go away when you change your position. ¨ You have pain or pressure in your pelvis that comes and goes in a pattern. ¨ You have intestinal cramping or flu-like symptoms, with or without diarrhea. ¨ You notice an increase or change in your vaginal discharge. Discharge may be heavy, mucus-like, watery, or streaked with blood. ¨ Your water breaks. · If you think you have  labor: ¨ Drink 2 or 3 glasses of water or juice. Not drinking enough fluids can cause contractions. ¨ Stop what you are doing, and empty your bladder. Then lie down on your left side for at least 1 hour. ¨ While lying on your side, find your breast bone. Put your fingers in the soft spot just below it. Move your fingers down toward your belly button to find the top of your uterus. Check to see if it is tight. ¨ Contractions can be weak or strong. Record your contractions for an hour. Time a contraction from the start of one contraction to the start of the next one. ¨ Single or several strong contractions without a pattern are called Elpidio-Rios contractions. They are practice contractions but not the start of labor. They often stop if you change what you are doing. ¨ Call your doctor if you have regular contractions. Where can you learn more? Go to http://remi-kannan.info/. Enter Y051 in the search box to learn more about \"Weeks 26 to 30 of Your Pregnancy: Care Instructions. \" Current as of: March 16, 2017 Content Version: 11.4 © 4220-2342 [employer]. Care instructions adapted under license by [employer] (which disclaims liability or warranty for this information). If you have questions about a medical condition or this instruction, always ask your healthcare professional. [address] any warranty or liability for your use of this information. Introducing \[facility] [place] [facility]\"" & HEALTH SERVICES! Dear Loraine Cedeno: Thank you for requesting a [employer] account. Our records indicate that you already have an active [employer] account. You can access your account anytime at [email or web address] Did you know that you can access your hospital and ER discharge instructions at any time in [employer]? You can also review all of your test results from your hospital stay or ER visit. Additional Information If you have questions, please visit the Frequently Asked Questions section of the [employer] website at [email or web address] Remember, [employer] is NOT to be used for urgent needs. For medical emergencies, dial 911. Now available from your iPhone and Android! Please provide this summary of care documentation to your next provider. Your primary care clinician is listed as Torres Escalera. If you have any questions after today's visit, please call [phone].

## 2018-06-27 NOTE — PROGRESS NOTES
[facility]  [email or web address]  [phone]    Damir Win MD, FACOG     Follow-up OB visit    Chief Complaint   Patient presents with    Routine Prenatal Visit       Vitals:    18 1300   BP: 120/82   Weight: 166 lb (75.3 kg)   Height: 5' 7\" (1.702 m)       Patient Active Problem List    Diagnosis Date Noted    Encounter for supervision of other normal pregnancy, unspecified trimester 2018    Scoliosis     Normal labor and delivery 2015    GERD (gastroesophageal reflux disease) 2015    Migraine 2013    Eczema 2013    Contusion of left knee 2013    Acne        The patient reports the following concerns: Migrains on and off X 2 weeks. Pt states Zofran is the only things that helps. See PN flowsheet for exam    29 y.o.  26w3d   Encounter Diagnoses   Name Primary?     Encounter for supervision of other normal pregnancy, unspecified trimester Yes    Other migraine without status migrainosus, not intractable      MFM fu to check previa  Bleeding prec  Fioricet/zofran prn: notify MD if NI in HA   [] SAB/bleeding precautions reviewed   [] PTL/PPROM precautions reviewed   [] Labor precautions reviewed   [] Fetal kick counts discussed   [] Labs reviewed with patient   [] Dareen Croak precautions reviewed   [] Consent reviewed   [] Handouts given to pt   [] Glucola handout    [] GBS/labor/Magic Hour handout   []    []    []    []    Follow-up Disposition: Not on File    Orders Placed This Encounter    ondansetron (ZOFRAN ODT) 8 mg disintegrating tablet    butalbital-acetaminophen-caffeine (FIORICET, ESGIC) -40 mg per tablet       Damir Win MD

## 2018-07-02 NOTE — TELEPHONE ENCOUNTER
Referral has been obtained and faxed to Dr TOLEDO [facility] office at [phone]. Auth # [id number]  6 visits  7/2/18-1/2/19.

## 2018-07-02 NOTE — TELEPHONE ENCOUNTER
Z34.80 (ICD-10-CM) - Encounter for supervision of other normal pregnancy, unspecified trimester    O44.02 (ICD-10-CM) - Placenta previa in second trimester    O44.02 (ICD-10-CM) - Placenta previa antepartum in second trimester  REFERRAL TO PERINATOLOGY [[id number]]  Please evaluate patient for placenta previa.

## 2018-07-06 LAB
GTT 120 MIN, EXTERNAL: 124
GTT 180 MIN, EXTERNAL: 78
GTT 60 MIN, EXTERNAL: 131
GTT, 1 HR, GLUCOLA, EXTERNAL: 136
GTT, FASTING, EXTERNAL: NORMAL

## 2018-07-09 ENCOUNTER — HOSPITAL ENCOUNTER (OUTPATIENT)
Dept: PERINATAL CARE | Age: 33
Discharge: HOME | End: 2018-07-09
Attending: OBSTETRICS & GYNECOLOGY
Payer: COMMERCIAL

## 2018-07-09 ENCOUNTER — LAB ONLY (OUTPATIENT)
Dept: OBGYN CLINIC | Age: 33
End: 2018-07-09

## 2018-07-09 DIAGNOSIS — Z34.80 ENCOUNTER FOR SUPERVISION OF OTHER NORMAL PREGNANCY, UNSPECIFIED TRIMESTER: Primary | ICD-10-CM

## 2018-07-09 PROCEDURE — 76817 TRANSVAGINAL US OBSTETRIC: CPT | Performed by: OBSTETRICS & GYNECOLOGY

## 2018-07-09 PROCEDURE — 76805 OB US >/= 14 WKS SNGL FETUS: CPT | Performed by: OBSTETRICS & GYNECOLOGY

## 2018-07-10 LAB — GLUCOSE 1H P 50 G GLC PO SERPL-MCNC: 136 MG/DL (ref 65–139)

## 2018-07-11 ENCOUNTER — TELEPHONE (OUTPATIENT)
Dept: OBGYN CLINIC | Age: 33
End: 2018-07-11

## 2018-07-11 DIAGNOSIS — Z34.80 ENCOUNTER FOR SUPERVISION OF OTHER NORMAL PREGNANCY, UNSPECIFIED TRIMESTER: ICD-10-CM

## 2018-07-11 NOTE — PROGRESS NOTES
LLP < 2cm  rec fu and us check growth and PLACENTA at 34 wks, can be done at [facility], schedule prn, if has no MFM fu.

## 2018-07-11 NOTE — TELEPHONE ENCOUNTER
Patient called, left ABBE on [facility] Triage line stating that she received her 1 hr gtt results but has questions about her results. 10:42 am - attempted to contact patient back, ABBE full, could not leave message.

## 2018-07-11 NOTE — PROGRESS NOTES
PNL/PL updated. Pt notified of results and MD recommendations. Pt verbalized understanding. Pt placed on the schedule for Friday 7/13/18 at 8:30am for 3 hr gtt.

## 2018-07-13 ENCOUNTER — LAB ONLY (OUTPATIENT)
Dept: OBGYN CLINIC | Age: 33
End: 2018-07-13

## 2018-07-13 DIAGNOSIS — O99.810 ABNORMAL GLUCOSE TOLERANCE TEST (GTT) DURING PREGNANCY, ANTEPARTUM: ICD-10-CM

## 2018-07-13 DIAGNOSIS — Z34.80 ENCOUNTER FOR SUPERVISION OF OTHER NORMAL PREGNANCY, UNSPECIFIED TRIMESTER: Primary | ICD-10-CM

## 2018-07-13 LAB
HCT, EXTERNAL: 32.5
HGB, EXTERNAL: 10.5
PLATELET CNT,   EXTERNAL: 167

## 2018-07-14 LAB
ERYTHROCYTE [DISTWIDTH] IN BLOOD BY AUTOMATED COUNT: 13.1 % (ref 12.3–15.4)
GLUCOSE 1H P 100 G GLC PO SERPL-MCNC: 131 MG/DL (ref 65–179)
GLUCOSE 2H P 100 G GLC PO SERPL-MCNC: 124 MG/DL (ref 65–154)
GLUCOSE 3H P 100 G GLC PO SERPL-MCNC: 78 MG/DL (ref 65–139)
GLUCOSE P FAST SERPL-MCNC: 80 MG/DL (ref 65–94)
HCT VFR BLD AUTO: 32.5 % (ref 34–46.6)
HGB BLD-MCNC: 10.5 G/DL (ref 11.1–15.9)
MCH RBC QN AUTO: 28.3 PG (ref 26.6–33)
MCHC RBC AUTO-ENTMCNC: 32.3 G/DL (ref 31.5–35.7)
MCV RBC AUTO: 88 FL (ref 79–97)
NOTE:, 102047: NORMAL
PLATELET # BLD AUTO: 167 X10E3/UL (ref 150–379)
RBC # BLD AUTO: 3.71 X10E6/UL (ref 3.77–5.28)
WBC # BLD AUTO: 7 X10E3/UL (ref 3.4–10.8)

## 2018-07-16 NOTE — PROGRESS NOTES
Mild anemia rec increase  iron rich diet or add iron supplement qd.   3hr ok rec healthy balanced diet. Update PL and PNL.

## 2018-07-17 DIAGNOSIS — Z34.80 ENCOUNTER FOR SUPERVISION OF OTHER NORMAL PREGNANCY, UNSPECIFIED TRIMESTER: ICD-10-CM

## 2018-07-17 NOTE — PROGRESS NOTES
Recorded 3 hr gtt and h/h results, added to prob list and sent pt a [employer] message with results.

## 2018-07-25 ENCOUNTER — ROUTINE PRENATAL (OUTPATIENT)
Dept: OBGYN CLINIC | Age: 33
End: 2018-07-25

## 2018-07-25 VITALS
BODY MASS INDEX: 26.84 KG/M2 | HEIGHT: 67 IN | SYSTOLIC BLOOD PRESSURE: 112 MMHG | DIASTOLIC BLOOD PRESSURE: 72 MMHG | WEIGHT: 171 LBS

## 2018-07-25 DIAGNOSIS — Z34.80 ENCOUNTER FOR SUPERVISION OF OTHER NORMAL PREGNANCY, UNSPECIFIED TRIMESTER: Primary | ICD-10-CM

## 2018-07-25 DIAGNOSIS — Z23 ENCOUNTER FOR IMMUNIZATION: ICD-10-CM

## 2018-07-25 DIAGNOSIS — O44.43 LOW LYING PLACENTA NOS OR WITHOUT HEMORRHAGE, THIRD TRIMESTER: ICD-10-CM

## 2018-07-25 RX ORDER — HYDROCORTISONE ACETATE 25 MG/1
25 SUPPOSITORY RECTAL EVERY 12 HOURS
Qty: 28 SUPPOSITORY | Refills: 0 | Status: SHIPPED | OUTPATIENT
Start: 2018-07-25 | End: 2018-08-08

## 2018-07-25 NOTE — MR AVS SNAPSHOT
[address] [facility] [address] [place] [address] 
[phone] Patient: Jazmin Gonzalez MRN: [id number]:[id number] Visit Information Date & Time Provider Department Dept. Phone Encounter #  
 7/25/2018  3:00 PM Andrzej Rodriguez MD Brodie Lutz [phone] [id number] Upcoming Health Maintenance Date Due  
 OB 3RD TRIMESTER TDAP 7/1/2018 Influenza Age 5 to Adult 8/1/2018 PAP AKA CERVICAL CYTOLOGY 2/21/2021 Allergies as of 7/25/2018  Review Complete On: 7/25/2018 By: Yamilet Henao LPN Severity Noted Reaction Type Reactions Bactrim [Sulfamethoprim]  03/24/2018    Hives Ceftin [Cefuroxime Axetil]  11/30/2010    Hives Gardasil [H Papillomavirus Vac,qval (Pf)]  09/07/2012    Other (comments)  
 syncope Current Immunizations  Reviewed on 10/16/2017 Name Date Hep A Vaccine (Adult) 1/30/2014 Human Papillomavirus 1/1/2006 Influenza Vaccine 8/19/2017, 9/16/2014, 8/24/2013 TDAP Vaccine 9/7/2012 Tdap 4/16/2015 Not reviewed this visit Vitals BP Height(growth percentile) Weight(growth percentile) LMP BMI OB Status 112/72 5' 7\" (1.702 m) 171 lb (77.6 kg) 12/24/2017 (Exact Date) 26.78 kg/m2 Pregnant Smoking Status Never Smoker BMI and BSA Data Body Mass Index Body Surface Area  
 26.78 kg/m 2 1.92 m 2 Preferred Pharmacy Pharmacy Name Phone [facility] [address] AT [address] [facility] [phone] Your Updated Medication List  
  
   
This list is accurate as of 7/25/18  3:19 PM.  Always use your most recent med list.  
  
  
  
  
 butalbital-acetaminophen-caffeine -40 mg per tablet Commonly known as:  Francisca Pimentel Take 1 Tab by mouth every four (4) hours as needed for Pain. CLARITIN 10 mg tablet Generic drug:  loratadine Take 10 mg by mouth. ondansetron 8 mg disintegrating tablet Commonly known as:  ZOFRAN ODT Take 1 Tab by mouth every eight (8) hours as needed for Nausea. RHINOCORT ALLERGY 32 mcg/actuation nasal spray Generic drug:  budesonide SLOW RELEASE IRON PO Take  by mouth. ZANTAC 75 75 mg tablet Generic drug:  raNITIdine Take 75 mg by mouth two (2) times a day. Patient Instructions Weeks 30 to 32 of Your Pregnancy: Care Instructions Your Care Instructions You have made it to the final months of your pregnancy. By now, your baby is really starting to look like a baby, with hair and plump skin. As you enter the final weeks of pregnancy, the reality of having a baby may start to set in. This is the time to settle on a name, get your household in order, set up a safe nursery, and find quality  if needed. Doing these things in advance will allow you to focus on caring for and enjoying your new baby. You may also want to have a tour of your hospital's labor and delivery unit to get a better idea of what to expect while you are in the hospital. 
During these last months, it is very important to take good care of yourself and pay attention to what your body needs. If your doctor says it is okay for you to work, don't push yourself too hard. Use the tips provided in this care sheet to ease heartburn and care for varicose veins. If you haven't already had the Tdap shot during this pregnancy, talk to your doctor about getting it. It will help protect your  against pertussis infection. Follow-up care is a key part of your treatment and safety. Be sure to make and go to all appointments, and call your doctor if you are having problems. It's also a good idea to know your test results and keep a list of the medicines you take. How can you care for yourself at home? Pay attention to your baby's movements · You should feel your baby move several times every day. · Your baby now turns less, and kicks and jabs more. · Your baby sleeps 20 to 45 minutes at a time and is more active at certain times of day. · If your doctor wants you to count your baby's kicks: 
¨ Empty your bladder, and lie on your side or relax in a comfortable chair. ¨ Write down your start time. ¨ Pay attention only to your baby's movements. Count any movement except hiccups. ¨ After you have counted 10 movements, write down your stop time. ¨ Write down how many minutes it took for your baby to move 10 times. ¨ If an hour goes by and you have not recorded 10 movements, have something to eat or drink and then count for another hour. If you do not record 10 movements in either hour, call your doctor. Ease heartburn · Eat small, frequent meals. · Do not eat chocolate, peppermint, or very spicy foods. Avoid drinks with caffeine, such as coffee, tea, and sodas. · Avoid bending over or lying down after meals. · Talk a short walk after you eat. · If heartburn is a problem at night, do not eat for 2 hours before bedtime. · Take antacids like Mylanta, Maalox, Rolaids, or Tums. Do not take antacids that have sodium bicarbonate. Care for varicose veins · Varicose veins are blood vessels that stretch out with the extra blood during pregnancy. Your legs may ache or throb. Most varicose veins will go away after the birth. · Avoid standing for long periods of time. Sit with your legs crossed at the ankles, not the knees. · Sit with your feet propped up. · Avoid tight clothing or stockings. Wear support hose. · Exercise regularly. Try walking for at least 30 minutes a day. Where can you learn more? Go to http://remi-kannan.info/. Enter A946 in the search box to learn more about \"Weeks 30 to 32 of Your Pregnancy: Care Instructions. \" Current as of: November 21, 2017 Content Version: 11.7 © 6191-5727 Healthwise, Incorporated. Care instructions adapted under license by [employer] (which disclaims liability or warranty for this information). If you have questions about a medical condition or this instruction, always ask your healthcare professional. [address] any warranty or liability for your use of this information. Introducing \[facility]\"" & HEALTH SERVICES! Dear Carlos Alberto Reyes: Thank you for requesting a [employer] account. Our records indicate that you already have an active [employer] account. You can access your account anytime at [email or web address] Did you know that you can access your hospital and ER discharge instructions at any time in [employer]? You can also review all of your test results from your hospital stay or ER visit. Additional Information If you have questions, please visit the Frequently Asked Questions section of the [employer] website at [email or web address] Remember, [employer] is NOT to be used for urgent needs. For medical emergencies, dial 911. Now available from your iPhone and Android! Please provide this summary of care documentation to your next provider. Your primary care clinician is listed as Tati Garay. If you have any questions after today's visit, please call [phone].

## 2018-07-25 NOTE — PROGRESS NOTES
_ [address] [facility]  [email or web address]  [phone]    Rosalio Aguilar MD, FACOG     Follow-up OB visit    Chief Complaint   Patient presents with    Routine Prenatal Visit       Vitals:    18 1516   BP: 112/72   Weight: 171 lb (77.6 kg)   Height: 5' 7\" (1.702 m)       Patient Active Problem List    Diagnosis Date Noted    Low lying placenta nos or without hemorrhage, third trimester 2018    Encounter for supervision of other normal pregnancy, unspecified trimester 2018    Scoliosis     Normal labor and delivery 2015    GERD (gastroesophageal reflux disease) 2015    Migraine 2013    Eczema 2013    Contusion of left knee 2013    Acne         The patient reports the following concerns: heartburn without relief from Zantac 75mg BID. Pt would like to discuss the option of nexium. See PN flowsheet for exam    29 y.o.  30w3d   Encounter Diagnoses   Name Primary?     Encounter for supervision of other normal pregnancy, unspecified trimester Yes    Encounter for immunization     Low lying placenta nos or without hemorrhage, third trimester    reviewed normal 3 hr and enc healthy balanced diet  Fu us, check plac   Bowel regimen   Disc meds for hemorrhoid if NI     [] SAB/bleeding precautions reviewed   [x] PTL/PPROM precautions reviewed   [] Labor precautions reviewed   [] Fetal kick counts discussed   [] Labs reviewed with patient   [] [place] precautions reviewed   [] Consent reviewed   [] Handouts given to pt   [] Glucola handout    [] GBS/labor/Magic Hour handout   []    [] We reviewed CDC recommendations for Tdap for patient and close contacts and RBA of receiving in pregnancy, advised obtaining in third trimester   [] Reviewed healthy nutrition in pregnancy and good exercise practices   [] We disc safer medications in pregnancy and referred patient to [facility] resources   [] We reviewed CDC recommendations for flu vaccine and RBA of receiving in pregnancy   []    []    []         Follow-up Disposition: Not on File    Orders Placed This Encounter    TETANUS, DIPHTHERIA TOXOIDS AND ACELLULAR PERTUSSIS VACCINE (TDAP), IN INDIVIDS. >=7, IM    CT IMMUNIZ ADMIN,1 SINGLE/COMB VAC/TOXOID    hydrocortisone (ANUCORT-HC) 25 mg Morenita Tinoco MD

## 2018-07-25 NOTE — PATIENT INSTRUCTIONS
Weeks 30 to 32 of Your Pregnancy: Care Instructions  Your Care Instructions    You have made it to the final months of your pregnancy. By now, your baby is really starting to look like a baby, with hair and plump skin. As you enter the final weeks of pregnancy, the reality of having a baby may start to set in. This is the time to settle on a name, get your household in order, set up a safe nursery, and find quality  if needed. Doing these things in advance will allow you to focus on caring for and enjoying your new baby. You may also want to have a tour of your hospital's labor and delivery unit to get a better idea of what to expect while you are in the hospital.  During these last months, it is very important to take good care of yourself and pay attention to what your body needs. If your doctor says it is okay for you to work, don't push yourself too hard. Use the tips provided in this care sheet to ease heartburn and care for varicose veins. If you haven't already had the Tdap shot during this pregnancy, talk to your doctor about getting it. It will help protect your  against pertussis infection. Follow-up care is a key part of your treatment and safety. Be sure to make and go to all appointments, and call your doctor if you are having problems. It's also a good idea to know your test results and keep a list of the medicines you take. How can you care for yourself at home? Pay attention to your baby's movements  · You should feel your baby move several times every day. · Your baby now turns less, and kicks and jabs more. · Your baby sleeps 20 to 45 minutes at a time and is more active at certain times of day. · If your doctor wants you to count your baby's kicks:  ¨ Empty your bladder, and lie on your side or relax in a comfortable chair. ¨ Write down your start time. ¨ Pay attention only to your baby's movements. Count any movement except hiccups.   ¨ After you have counted 10 movements, write down your stop time. ¨ Write down how many minutes it took for your baby to move 10 times. ¨ If an hour goes by and you have not recorded 10 movements, have something to eat or drink and then count for another hour. If you do not record 10 movements in either hour, call your doctor. Ease heartburn  · Eat small, frequent meals. · Do not eat chocolate, peppermint, or very spicy foods. Avoid drinks with caffeine, such as coffee, tea, and sodas. · Avoid bending over or lying down after meals. · Talk a short walk after you eat. · If heartburn is a problem at night, do not eat for 2 hours before bedtime. · Take antacids like Mylanta, Maalox, Rolaids, or Tums. Do not take antacids that have sodium bicarbonate. Care for varicose veins  · Varicose veins are blood vessels that stretch out with the extra blood during pregnancy. Your legs may ache or throb. Most varicose veins will go away after the birth. · Avoid standing for long periods of time. Sit with your legs crossed at the ankles, not the knees. · Sit with your feet propped up. · Avoid tight clothing or stockings. Wear support hose. · Exercise regularly. Try walking for at least 30 minutes a day. Where can you learn more? Go to http://remi-kannan.info/. Enter L571 in the search box to learn more about \"Weeks 30 to 32 of Your Pregnancy: Care Instructions. \"  Current as of: November 21, 2017  Content Version: 11.7  © 8988-6083 [employer]. Care instructions adapted under license by [employer] (which disclaims liability or warranty for this information). If you have questions about a medical condition or this instruction, always ask your healthcare professional. [place] [address] any warranty or liability for your use of this information.

## 2018-07-25 NOTE — PROGRESS NOTES
Patient received the Tdap vaccine in the right deltoid without complications, per MD order. Consent signed.

## 2018-07-26 ENCOUNTER — TELEPHONE (OUTPATIENT)
Dept: OBGYN CLINIC | Age: 33
End: 2018-07-26

## 2018-07-26 ENCOUNTER — HOSPITAL ENCOUNTER (OUTPATIENT)
Age: 33
Setting detail: OBSERVATION
Discharge: HOME | End: 2018-07-26
Attending: OBSTETRICS & GYNECOLOGY | Admitting: OBSTETRICS & GYNECOLOGY
Payer: COMMERCIAL

## 2018-07-26 VITALS
HEART RATE: 92 BPM | WEIGHT: 171 LBS | BODY MASS INDEX: 26.84 KG/M2 | HEIGHT: 67 IN | DIASTOLIC BLOOD PRESSURE: 74 MMHG | SYSTOLIC BLOOD PRESSURE: 136 MMHG | OXYGEN SATURATION: 85 %

## 2018-07-26 PROBLEM — Z34.90 PREGNANCY: Status: ACTIVE | Noted: 2018-07-26

## 2018-07-26 LAB
ABO + RH BLD: NORMAL
FETAL BLOOD VOL PATIENT KLEIH BETKE: NORMAL ML

## 2018-07-26 PROCEDURE — 74011250637 HC RX REV CODE- 250/637: Performed by: OBSTETRICS & GYNECOLOGY

## 2018-07-26 PROCEDURE — 85460 HEMOGLOBIN FETAL: CPT | Performed by: OBSTETRICS & GYNECOLOGY

## 2018-07-26 PROCEDURE — 99218 HC RM OBSERVATION: CPT

## 2018-07-26 PROCEDURE — 86901 BLOOD TYPING SEROLOGIC RH(D): CPT | Performed by: OBSTETRICS & GYNECOLOGY

## 2018-07-26 PROCEDURE — 36415 COLL VENOUS BLD VENIPUNCTURE: CPT | Performed by: OBSTETRICS & GYNECOLOGY

## 2018-07-26 PROCEDURE — 12032 INTMD RPR S/A/T/EXT 2.6-7.5: CPT

## 2018-07-26 PROCEDURE — 99285 EMERGENCY DEPT VISIT HI MDM: CPT

## 2018-07-26 PROCEDURE — 59025 FETAL NON-STRESS TEST: CPT

## 2018-07-26 PROCEDURE — 86900 BLOOD TYPING SEROLOGIC ABO: CPT | Performed by: OBSTETRICS & GYNECOLOGY

## 2018-07-26 RX ORDER — DIPHENHYDRAMINE HCL 25 MG
50 CAPSULE ORAL
Status: DISCONTINUED | OUTPATIENT
Start: 2018-07-26 | End: 2018-07-26 | Stop reason: HOSPADM

## 2018-07-26 RX ORDER — ACETAMINOPHEN 325 MG/1
325 TABLET ORAL
Status: DISCONTINUED | OUTPATIENT
Start: 2018-07-26 | End: 2018-07-26

## 2018-07-26 RX ORDER — ACETAMINOPHEN 325 MG/1
650 TABLET ORAL
Status: DISCONTINUED | OUTPATIENT
Start: 2018-07-26 | End: 2018-07-26 | Stop reason: HOSPADM

## 2018-07-26 RX ORDER — CALCIUM CARBONATE 200(500)MG
200 TABLET,CHEWABLE ORAL
Status: DISCONTINUED | OUTPATIENT
Start: 2018-07-26 | End: 2018-07-26 | Stop reason: HOSPADM

## 2018-07-26 RX ADMIN — ACETAMINOPHEN 325 MG: 325 TABLET ORAL at 13:02

## 2018-07-26 NOTE — TELEPHONE ENCOUNTER
Call received at 9:04am      29year old  30w4d pregnant patient last seen in the office yesterday. Patient calling to say that she fell down the steps this am. Patient states she does not think she fell on her abdomen. Patient states she fell on her knee and ankle and has cut on her knee. Patient denies vaginal bleeding, ROM, contractions and reports positive fetal movement. Patient advised of need to go to labor and delivery for 4 hours of monitoring as per  Dr. Gamez Given. Patient verbalized understanding and this nurse called report to Labor and delivery.

## 2018-07-26 NOTE — IP AVS SNAPSHOT
[address] 
[phone] Patient: Daryle Co MRN: [id number] LCE:5[birth year]98[birth year]8 About your hospitalization You were admitted on:  N/A You last received care in the:  [facility] 2 LABOR & DELIVERY You were discharged on:  July 26, 2018 Why you were hospitalized Your primary diagnosis was:  Not on File Follow-up Information None Your Scheduled Appointments Wednesday August 08, 2018 11:00 AM EDT  
OB VISIT with Frances Kang MD  
[employer] [facility] ([address] [place] [address]) [address] [place] [address]  
[phone] Tuesday August 21, 2018  9:45 AM EDT ULTRASOUND with Evans Dotson [employer] ([address] [place] [address]) [address] [place] [address]  
[phone] Tuesday August 21, 2018 10:30 AM EDT  
OB VISIT with Frances Kang MD  
[employer] ([address] [place] [address]) [address] [place] [address]  
[phone] Tuesday September 04, 2018  9:50 AM EDT  
OB VISIT with Frances Kang MD  
[employer] ([address] [place] [address]) [address] [place] [address]  
[phone] Discharge Orders None A check heidy indicates which time of day the medication should be taken. My Medications ASK your doctor about these medications Instructions Each Dose to Equal  
 Morning Noon Evening Bedtime  
 butalbital-acetaminophen-caffeine -40 mg per tablet Commonly known as:  Earlariane Mariscalos Your last dose was: Your next dose is: Take 1 Tab by mouth every four (4) hours as needed for Pain. 1 Tab  
    
   
   
   
  
 hydrocortisone 25 mg Supp Commonly known as:  ANUCORT-HC Your last dose was: Your next dose is: Insert 1 Suppository into rectum every twelve (12) hours for 14 days. 25 mg  
    
   
   
   
  
 SLOW RELEASE IRON PO Your last dose was: Your next dose is: Take  by mouth. ZANTAC 75 75 mg tablet Generic drug:  raNITIdine Your last dose was: Your next dose is: Take 75 mg by mouth two (2) times a day. 75 mg Discharge Instructions *Follow up with Dr Schmidt Both as per normal routine, call for guidance/consultation sooner if you experience bleeding, leakage of fluids, severe abdominal pain. *Use handrails when going up and down the steps for added stability. Belly Pain in Pregnancy: Care Instructions Your Care Instructions When you're pregnant, any belly pain can be a worry. You may not want to call your doctor about every pain you have. But you don't want to miss something that is dangerous for you or your baby. Even if it feels familiar, belly pain can mean something new when you're pregnant. It's important to know when to call your doctor. It will also help to know how to care for yourself at home when your pain is not caused by anything harmful. · When belly pain is more severe or constant, see a doctor right away. · If you're sure your belly pain is a sign of labor, call your doctor. · When belly pain is brief, it's usually a normal part of pregnancy. It might be related to changes in the growing uterus. Or it could be the stretching of ligaments called round ligaments. These ligaments help support the uterus. Round ligament pain can be on either side of your belly. It can also be felt in your hips or groin. Follow-up care is a key part of your treatment and safety. Be sure to make and go to all appointments, and call your doctor if you are having problems. It's also a good idea to know your test results and keep a list of the medicines you take. How can you tell if belly pain is a sign of labor? When belly pain is caused by labor, it can feel like mild or menstrual-like cramps in your lower belly. These cramps are probably contractions. They can happen in your second or third trimester. You may also have: · A steady, dull ache in your lower back, pelvis, or thighs. · A feeling of pressure in your pelvis or lower belly. · Changes in your vaginal discharge or a sudden release of fluid from the vagina. If you think you are in labor, call your doctor. How can you care for yourself at home? When belly pain is mild and is not a symptom of labor: · Rest until you feel better. · Take a warm bath. · Think about what you drink and eat: ¨ Drink plenty of fluids. Choose water and other caffeine-free clear liquids until you feel better. ¨ Try eating small, frequent meals. If your stomach is upset, try bland, low-fat foods like plain rice, broiled chicken, toast, and yogurt. · Think about how you move if you are having brief pains from stretching of the round ligaments. ¨ Try gentle stretching. ¨ Move a little more slowly when turning in bed or getting up from a chair, so those ligaments don't stretch quickly. ¨ Lean forward a bit if you think you are going to cough or sneeze. When should you call for help? Call 911 anytime you think you may need emergency care. For example, call if: 
  · You have sudden, severe pain in your belly.  
  · You have severe vaginal bleeding.  
 Call your doctor now or seek immediate medical care if: 
  · You have new or worse belly pain or cramping.  
  · You have any vaginal bleeding.  
  · You have a fever.  
  · You have symptoms of preeclampsia, such as: 
¨ Sudden swelling of your face, hands, or feet. ¨ New vision problems (such as dimness or blurring). ¨ A severe headache.  
  · You think that you may be in labor.  This means that you've had at least 8 contractions within 1 hour or at least 4 contractions within 20 minutes, even after you change your position and drink fluids.  
  · You have symptoms of a urinary tract infection. These may include: 
¨ Pain or burning when you urinate. ¨ A frequent need to urinate without being able to pass much urine. ¨ Pain in the flank, which is just below the rib cage and above the waist on either side of the back. ¨ Blood in your urine.  
 Watch closely for changes in your health, and be sure to contact your doctor if you are worried about your or your baby's health. Where can you learn more? Go to http://remi-kannan.info/. Enter 316 305 [id number] in the search box to learn more about \"Belly Pain in Pregnancy: Care Instructions. \" Current as of: November 21, 2017 Content Version: 11.7 © 3174-1162 [employer]. Care instructions adapted under license by [employer] (which disclaims liability or warranty for this information). If you have questions about a medical condition or this instruction, always ask your healthcare professional. [place] [address] any warranty or liability for your use of this information. Weeks 30 to 32 of Your Pregnancy: Care Instructions Your Care Instructions You have made it to the final months of your pregnancy. By now, your baby is really starting to look like a baby, with hair and plump skin. As you enter the final weeks of pregnancy, the reality of having a baby may start to set in. This is the time to settle on a name, get your household in order, set up a safe nursery, and find quality  if needed. Doing these things in advance will allow you to focus on caring for and enjoying your new baby. You may also want to have a tour of your hospital's labor and delivery unit to get a better idea of what to expect while you are in the hospital. 
During these last months, it is very important to take good care of yourself and pay attention to what your body needs.  If your doctor says it is okay for you to work, don't push yourself too hard. Use the tips provided in this care sheet to ease heartburn and care for varicose veins. If you haven't already had the Tdap shot during this pregnancy, talk to your doctor about getting it. It will help protect your  against pertussis infection. Follow-up care is a key part of your treatment and safety. Be sure to make and go to all appointments, and call your doctor if you are having problems. It's also a good idea to know your test results and keep a list of the medicines you take. How can you care for yourself at home? Pay attention to your baby's movements · You should feel your baby move several times every day. · Your baby now turns less, and kicks and jabs more. · Your baby sleeps 20 to 45 minutes at a time and is more active at certain times of day. · If your doctor wants you to count your baby's kicks: 
¨ Empty your bladder, and lie on your side or relax in a comfortable chair. ¨ Write down your start time. ¨ Pay attention only to your baby's movements. Count any movement except hiccups. ¨ After you have counted 10 movements, write down your stop time. ¨ Write down how many minutes it took for your baby to move 10 times. ¨ If an hour goes by and you have not recorded 10 movements, have something to eat or drink and then count for another hour. If you do not record 10 movements in either hour, call your doctor. Ease heartburn · Eat small, frequent meals. · Do not eat chocolate, peppermint, or very spicy foods. Avoid drinks with caffeine, such as coffee, tea, and sodas. · Avoid bending over or lying down after meals. · Talk a short walk after you eat. · If heartburn is a problem at night, do not eat for 2 hours before bedtime. · Take antacids like Mylanta, Maalox, Rolaids, or Tums. Do not take antacids that have sodium bicarbonate. Care for varicose veins · Varicose veins are blood vessels that stretch out with the extra blood during pregnancy. Your legs may ache or throb. Most varicose veins will go away after the birth. · Avoid standing for long periods of time. Sit with your legs crossed at the ankles, not the knees. · Sit with your feet propped up. · Avoid tight clothing or stockings. Wear support hose. · Exercise regularly. Try walking for at least 30 minutes a day. Where can you learn more? Go to http://remi-kannan.info/. Enter X343 in the search box to learn more about \"Weeks 30 to 32 of Your Pregnancy: Care Instructions. \" Current as of: November 21, 2017 Content Version: 11.7 © 0474-4587 WorldDesk. Care instructions adapted under license by [employer] (which disclaims liability or warranty for this information). If you have questions about a medical condition or this instruction, always ask your healthcare professional. [place] [address] any warranty or liability for your use of this information. Counting Your Baby's Kicks: Care Instructions Your Care Instructions Counting your baby's kicks is one way your doctor can tell that your baby is healthy. Most women-especially in a first pregnancy-feel their baby move for the first time between 16 and 22 weeks. The movement may feel like flutters rather than kicks. Your baby may move more at certain times of the day. When you are active, you may notice less kicking than when you are resting. At your prenatal visits, your doctor will ask whether the baby is active. In your last trimester, your doctor may ask you to count the number of times you feel your baby move. Follow-up care is a key part of your treatment and safety. Be sure to make and go to all appointments, and call your doctor if you are having problems. It's also a good idea to know your test results and keep a list of the medicines you take. How do you count fetal kicks? · A common method of checking your baby's movement is to count the number of kicks or moves you feel in 1 hour. Ten movements (such as kicks, flutters, or rolls) in 1 hour are normal. Some doctors suggest that you count in the morning until you get to 10 movements. Then you can quit for that day and start again the next day. · Pick your baby's most active time of day to count. This may be any time from morning to evening. · If you do not feel 10 movements in an hour, your baby may be sleeping. Wait for the next hour and count again. When should you call for help? Call your doctor now or seek immediate medical care if: 
  · You noticed that your baby has stopped moving or is moving much less than normal.  
 Watch closely for changes in your health, and be sure to contact your doctor if you have any problems. Where can you learn more? Go to http://remi-kannan.info/. Enter B375 in the search box to learn more about \"Counting Your Baby's Kicks: Care Instructions. \" Current as of: 2017 Content Version: 11.7 © 9849-6379 La Ruche qui dit Oui. Care instructions adapted under license by [employer] (which disclaims liability or warranty for this information). If you have questions about a medical condition or this instruction, always ask your healthcare professional. [address] any warranty or liability for your use of this information. Pregnancy Precautions: Care Instructions Your Care Instructions There is no sure way to prevent labor before your due date ( labor) or to prevent most other pregnancy problems. But there are things you can do to increase your chances of a healthy pregnancy. Go to your appointments, follow your doctor's advice, and take good care of yourself. Eat well, and exercise (if your doctor agrees). And make sure to drink plenty of water. Follow-up care is a key part of your treatment and safety. Be sure to make and go to all appointments, and call your doctor if you are having problems. It's also a good idea to know your test results and keep a list of the medicines you take. How can you care for yourself at home? · Make sure you go to your prenatal appointments. At each visit, your doctor will check your blood pressure. Your doctor will also check to see if you have protein in your urine. High blood pressure and protein in urine are signs of preeclampsia. This condition can be dangerous for you and your baby. · Drink plenty of fluids, enough so that your urine is light yellow or clear like water. Dehydration can cause contractions. If you have kidney, heart, or liver disease and have to limit fluids, talk with your doctor before you increase the amount of fluids you drink. · Tell your doctor right away if you notice any symptoms of an infection, such as: ¨ Burning when you urinate. ¨ A foul-smelling discharge from your vagina. ¨ Vaginal itching. ¨ Unexplained fever. ¨ Unusual pain or soreness in your uterus or lower belly. · Eat a balanced diet. Include plenty of foods that are high in calcium and iron. ¨ Foods high in calcium include milk, cheese, yogurt, almonds, and broccoli. ¨ Foods high in iron include red meat, shellfish, poultry, eggs, beans, raisins, whole-grain bread, and leafy green vegetables. · Do not smoke. If you need help quitting, talk to your doctor about stop-smoking programs and medicines. These can increase your chances of quitting for good. · Do not drink alcohol or use illegal drugs. · Follow your doctor's directions about activity. Your doctor will let you know how much, if any, exercise you can do. · Ask your doctor if you can have sex. If you are at risk for early labor, your doctor may ask you to not have sex. · Take care to prevent falls.  During pregnancy, your joints are loose, and your balance is off. Sports such as bicycling, skiing, or in-line skating can increase your risk of falling. And don't ride horses or motorcycles, dive, water ski, scuba dive, or parachute jump while you are pregnant. · Avoid getting very hot. Do not use saunas or hot tubs. Avoid staying out in the sun in hot weather for long periods. Take acetaminophen (Tylenol) to lower a high fever. · Do not take any over-the-counter or herbal medicines or supplements without talking to your doctor or pharmacist first. 
When should you call for help? Call 911 anytime you think you may need emergency care. For example, call if: 
  · You passed out (lost consciousness).  
  · You have severe vaginal bleeding.  
  · You have severe pain in your belly or pelvis.  
  · You have had fluid gushing or leaking from your vagina and you know or think the umbilical cord is bulging into your vagina. If this happens, immediately get down on your knees so your rear end (buttocks) is higher than your head. This will decrease the pressure on the cord until help arrives.  
[facility] your doctor now or seek immediate medical care if: 
  · You have signs of preeclampsia, such as: 
¨ Sudden swelling of your face, hands, or feet. ¨ New vision problems (such as dimness or blurring). ¨ A severe headache.  
  · You have any vaginal bleeding.  
  · You have belly pain or cramping.  
  · You have a fever.  
  · You have had regular contractions (with or without pain) for an hour. This means that you have 8 or more within 1 hour or 4 or more in 20 minutes after you change your position and drink fluids.  
  · You have a sudden release of fluid from your vagina.  
  · You have low back pain or pelvic pressure that does not go away.  
  · You notice that your baby has stopped moving or is moving much less than normal.  
 Watch closely for changes in your health, and be sure to contact your doctor if you have any problems. Where can you learn more? Go to http://remi-kannan.info/. Enter [id number] in the search box to learn more about \"Pregnancy Precautions: Care Instructions. \" Current as of: November 21, 2017 Content Version: 11.7 © 5258-2401 [employer]. Care instructions adapted under license by [employer] (which disclaims liability or warranty for this information). If you have questions about a medical condition or this instruction, always ask your healthcare professional. [address] any warranty or liability for your use of this information. Introducing [facility] HE[facility]! Dear Angle Anaya: Thank you for requesting a  account. Our records indicate that you already have an active  account. You can access your account anytime at [email or web address] Did you know that you can access your hospital and ER discharge instructions at any time in ? You can also review all of your test results from your hospital stay or ER visit. Additional Information If you have questions, please visit the Frequently Asked Questions section of the  website at [email or web address] Remember,  is NOT to be used for urgent needs. For medical emergencies, dial 911. Now available from your iPhone and Android! Introducing Crescencio Mane As a Ari Single patient, I wanted to make you aware of our electronic visit tool called Crescencio Slaughternikodina. Ari Nur 24/7 allows you to connect within minutes with a medical provider 24 hours a day, seven days a week via a mobile device or tablet or logging into a secure website from your computer. You can access Crescencio Mane from anywhere in the [place].  
 
A virtual visit might be right for you when you have a simple condition and feel like you just dont want to get out of bed, or cant get away from work for an appointment, when your regular [facility] provider is not available (evenings, weekends or holidays), or when youre out of town and need minor care. Electronic visits cost only $49 and if the [facility] 24/7 provider determines a prescription is needed to treat your condition, one can be electronically transmitted to a nearby pharmacy*. Please take a moment to enroll today if you have not already done so. The enrollment process is free and takes just a few minutes. To enroll, please download the WiiiWaaa 24/7 clarke to your tablet or phone, or visit [email or web address] to enroll on your computer. And, as an [address] [place] [address] patient with a InviteDEV account, the results of your visits will be scanned into your electronic medical record and your primary care provider will be able to view the scanned results. We urge you to continue to see your regular [facility] provider for your ongoing medical care. And while your primary care provider may not be the one available when you seek a Cutetownnikofin virtual visit, the peace of mind you get from getting a real diagnosis real time can be priceless. For more information on kooaba, view our Frequently Asked Questions (FAQs) at [email or web address]. Sincerely, 
 
Brady Longo MD 
Chief Medical Officer [facility] Josey Gordillo *:  certain medications cannot be prescribed via kooaba Providers Seen During Your Hospitalization Provider Specialty Primary office phone Swapnil Ling MD Obstetrics & Gynecology [phone] Your Primary Care Physician (PCP) Primary Care Physician Office Phone Office Fax Dhara Stanford [phone] You are allergic to the following Allergen Reactions Bactrim (Sulfamethoprim) Hives Ceftin (Cefuroxime Axetil) Hives Gardasil (H Papillomavirus Vac,Qval (Pf)) Other (comments)  
 syncope Recent Documentation Height Weight BMI OB Status Smoking Status 1.702 m 77.6 kg 26.78 kg/m2 Pregnant Never Smoker Emergency Contacts Name Discharge Info Relation Home Work Mobile Katya Collins5 CAREGIVER [3] Spouse [3] [phone] Patient Belongings The following personal items are in your possession at time of discharge: 
                             
 
  
  
 Please provide this summary of care documentation to your next provider. Signatures-by signing, you are acknowledging that this After Visit Summary has been reviewed with you and you have received a copy. Patient Signature:  ____________________________________________________________ Date:  ____________________________________________________________  
  
[facility] Provider Signature:  ____________________________________________________________ Date:  ____________________________________________________________

## 2018-07-26 NOTE — DISCHARGE INSTRUCTIONS
*Follow up with Dr Bao Wright as per normal routine, call for guidance/consultation sooner if you experience bleeding, leakage of fluids, severe abdominal pain. *Use handrails when going up and down the steps for added stability. Belly Pain in Pregnancy: Care Instructions  Your Care Instructions    When you're pregnant, any belly pain can be a worry. You may not want to call your doctor about every pain you have. But you don't want to miss something that is dangerous for you or your baby. Even if it feels familiar, belly pain can mean something new when you're pregnant. It's important to know when to call your doctor. It will also help to know how to care for yourself at home when your pain is not caused by anything harmful. · When belly pain is more severe or constant, see a doctor right away. · If you're sure your belly pain is a sign of labor, call your doctor. · When belly pain is brief, it's usually a normal part of pregnancy. It might be related to changes in the growing uterus. Or it could be the stretching of ligaments called round ligaments. These ligaments help support the uterus. Round ligament pain can be on either side of your belly. It can also be felt in your hips or groin. Follow-up care is a key part of your treatment and safety. Be sure to make and go to all appointments, and call your doctor if you are having problems. It's also a good idea to know your test results and keep a list of the medicines you take. How can you tell if belly pain is a sign of labor? When belly pain is caused by labor, it can feel like mild or menstrual-like cramps in your lower belly. These cramps are probably contractions. They can happen in your second or third trimester. You may also have:  · A steady, dull ache in your lower back, pelvis, or thighs. · A feeling of pressure in your pelvis or lower belly. · Changes in your vaginal discharge or a sudden release of fluid from the vagina.   If you think you are in labor, call your doctor. How can you care for yourself at home? When belly pain is mild and is not a symptom of labor:  · Rest until you feel better. · Take a warm bath. · Think about what you drink and eat:  ¨ Drink plenty of fluids. Choose water and other caffeine-free clear liquids until you feel better. ¨ Try eating small, frequent meals. If your stomach is upset, try bland, low-fat foods like plain rice, broiled chicken, toast, and yogurt. · Think about how you move if you are having brief pains from stretching of the round ligaments. ¨ Try gentle stretching. ¨ Move a little more slowly when turning in bed or getting up from a chair, so those ligaments don't stretch quickly. ¨ Lean forward a bit if you think you are going to cough or sneeze. When should you call for help? Call 911 anytime you think you may need emergency care. For example, call if:    · You have sudden, severe pain in your belly.     · You have severe vaginal bleeding.    Call your doctor now or seek immediate medical care if:    · You have new or worse belly pain or cramping.     · You have any vaginal bleeding.     · You have a fever.     · You have symptoms of preeclampsia, such as:  ¨ Sudden swelling of your face, hands, or feet. ¨ New vision problems (such as dimness or blurring). ¨ A severe headache.     · You think that you may be in labor. This means that you've had at least 8 contractions within 1 hour or at least 4 contractions within 20 minutes, even after you change your position and drink fluids.     · You have symptoms of a urinary tract infection. These may include:  ¨ Pain or burning when you urinate. ¨ A frequent need to urinate without being able to pass much urine. ¨ Pain in the flank, which is just below the rib cage and above the waist on either side of the back.   ¨ Blood in your urine.    Watch closely for changes in your health, and be sure to contact your doctor if you are worried about your or your baby's health. Where can you learn more? Go to http://remi-kannan.info/. Enter 515 215 [id number] in the search box to learn more about \"Belly Pain in Pregnancy: Care Instructions. \"  Current as of: 2017  Content Version: 11.7  © 8241-1794 [employer]. Care instructions adapted under license by [employer] (which disclaims liability or warranty for this information). If you have questions about a medical condition or this instruction, always ask your healthcare professional. [address] any warranty or liability for your use of this information. Weeks 30 to 32 of Your Pregnancy: Care Instructions  Your Care Instructions    You have made it to the final months of your pregnancy. By now, your baby is really starting to look like a baby, with hair and plump skin. As you enter the final weeks of pregnancy, the reality of having a baby may start to set in. This is the time to settle on a name, get your household in order, set up a safe nursery, and find quality  if needed. Doing these things in advance will allow you to focus on caring for and enjoying your new baby. You may also want to have a tour of your hospital's labor and delivery unit to get a better idea of what to expect while you are in the hospital.  During these last months, it is very important to take good care of yourself and pay attention to what your body needs. If your doctor says it is okay for you to work, don't push yourself too hard. Use the tips provided in this care sheet to ease heartburn and care for varicose veins. If you haven't already had the Tdap shot during this pregnancy, talk to your doctor about getting it. It will help protect your  against pertussis infection. Follow-up care is a key part of your treatment and safety. Be sure to make and go to all appointments, and call your doctor if you are having problems.  It's also a good idea to know your test results and keep a list of the medicines you take. How can you care for yourself at home? Pay attention to your baby's movements  · You should feel your baby move several times every day. · Your baby now turns less, and kicks and jabs more. · Your baby sleeps 20 to 45 minutes at a time and is more active at certain times of day. · If your doctor wants you to count your baby's kicks:  ¨ Empty your bladder, and lie on your side or relax in a comfortable chair. ¨ Write down your start time. ¨ Pay attention only to your baby's movements. Count any movement except hiccups. ¨ After you have counted 10 movements, write down your stop time. ¨ Write down how many minutes it took for your baby to move 10 times. ¨ If an hour goes by and you have not recorded 10 movements, have something to eat or drink and then count for another hour. If you do not record 10 movements in either hour, call your doctor. Ease heartburn  · Eat small, frequent meals. · Do not eat chocolate, peppermint, or very spicy foods. Avoid drinks with caffeine, such as coffee, tea, and sodas. · Avoid bending over or lying down after meals. · Talk a short walk after you eat. · If heartburn is a problem at night, do not eat for 2 hours before bedtime. · Take antacids like Mylanta, Maalox, Rolaids, or Tums. Do not take antacids that have sodium bicarbonate. Care for varicose veins  · Varicose veins are blood vessels that stretch out with the extra blood during pregnancy. Your legs may ache or throb. Most varicose veins will go away after the birth. · Avoid standing for long periods of time. Sit with your legs crossed at the ankles, not the knees. · Sit with your feet propped up. · Avoid tight clothing or stockings. Wear support hose. · Exercise regularly. Try walking for at least 30 minutes a day. Where can you learn more? Go to http://remi-kannan.info/.   Enter F980 in the search box to learn more about \"Weeks 30 to 32 of Your Pregnancy: Care Instructions. \"  Current as of: November 21, 2017  Content Version: 11.7  © 4875-5486 [employer]. Care instructions adapted under license by [employer] (which disclaims liability or warranty for this information). If you have questions about a medical condition or this instruction, always ask your healthcare professional. [address] any warranty or liability for your use of this information. Counting Your Baby's Kicks: Care Instructions  Your Care Instructions    Counting your baby's kicks is one way your doctor can tell that your baby is healthy. Most women-especially in a first pregnancy-feel their baby move for the first time between 16 and 22 weeks. The movement may feel like flutters rather than kicks. Your baby may move more at certain times of the day. When you are active, you may notice less kicking than when you are resting. At your prenatal visits, your doctor will ask whether the baby is active. In your last trimester, your doctor may ask you to count the number of times you feel your baby move. Follow-up care is a key part of your treatment and safety. Be sure to make and go to all appointments, and call your doctor if you are having problems. It's also a good idea to know your test results and keep a list of the medicines you take. How do you count fetal kicks? · A common method of checking your baby's movement is to count the number of kicks or moves you feel in 1 hour. Ten movements (such as kicks, flutters, or rolls) in 1 hour are normal. Some doctors suggest that you count in the morning until you get to 10 movements. Then you can quit for that day and start again the next day. · Pick your baby's most active time of day to count. This may be any time from morning to evening. · If you do not feel 10 movements in an hour, your baby may be sleeping. Wait for the next hour and count again.   When should you call for help? Call your doctor now or seek immediate medical care if:    · You noticed that your baby has stopped moving or is moving much less than normal.    Watch closely for changes in your health, and be sure to contact your doctor if you have any problems. Where can you learn more? Go to http://remi-kannan.info/. Enter H504 in the search box to learn more about \"Counting Your Baby's Kicks: Care Instructions. \"  Current as of: 2017  Content Version: 11.7  © 6787-9289 [employer]. Care instructions adapted under license by [employer] (which disclaims liability or warranty for this information). If you have questions about a medical condition or this instruction, always ask your healthcare professional. [address] any warranty or liability for your use of this information. Pregnancy Precautions: Care Instructions  Your Care Instructions    There is no sure way to prevent labor before your due date ( labor) or to prevent most other pregnancy problems. But there are things you can do to increase your chances of a healthy pregnancy. Go to your appointments, follow your doctor's advice, and take good care of yourself. Eat well, and exercise (if your doctor agrees). And make sure to drink plenty of water. Follow-up care is a key part of your treatment and safety. Be sure to make and go to all appointments, and call your doctor if you are having problems. It's also a good idea to know your test results and keep a list of the medicines you take. How can you care for yourself at home? · Make sure you go to your prenatal appointments. At each visit, your doctor will check your blood pressure. Your doctor will also check to see if you have protein in your urine. High blood pressure and protein in urine are signs of preeclampsia. This condition can be dangerous for you and your baby.   · Drink plenty of fluids, enough so that your urine is light yellow or clear like water. Dehydration can cause contractions. If you have kidney, heart, or liver disease and have to limit fluids, talk with your doctor before you increase the amount of fluids you drink. · Tell your doctor right away if you notice any symptoms of an infection, such as:  ¨ Burning when you urinate. ¨ A foul-smelling discharge from your vagina. ¨ Vaginal itching. ¨ Unexplained fever. ¨ Unusual pain or soreness in your uterus or lower belly. · Eat a balanced diet. Include plenty of foods that are high in calcium and iron. ¨ Foods high in calcium include milk, cheese, yogurt, almonds, and broccoli. ¨ Foods high in iron include red meat, shellfish, poultry, eggs, beans, raisins, whole-grain bread, and leafy green vegetables. · Do not smoke. If you need help quitting, talk to your doctor about stop-smoking programs and medicines. These can increase your chances of quitting for good. · Do not drink alcohol or use illegal drugs. · Follow your doctor's directions about activity. Your doctor will let you know how much, if any, exercise you can do. · Ask your doctor if you can have sex. If you are at risk for early labor, your doctor may ask you to not have sex. · Take care to prevent falls. During pregnancy, your joints are loose, and your balance is off. Sports such as bicycling, skiing, or in-line skating can increase your risk of falling. And don't ride horses or motorcycles, dive, water ski, scuba dive, or parachute jump while you are pregnant. · Avoid getting very hot. Do not use saunas or hot tubs. Avoid staying out in the sun in hot weather for long periods. Take acetaminophen (Tylenol) to lower a high fever. · Do not take any over-the-counter or herbal medicines or supplements without talking to your doctor or pharmacist first.  When should you call for help? Call 911 anytime you think you may need emergency care.  For example, call if:    · You passed out (lost consciousness).     · You have severe vaginal bleeding.     · You have severe pain in your belly or pelvis.     · You have had fluid gushing or leaking from your vagina and you know or think the umbilical cord is bulging into your vagina. If this happens, immediately get down on your knees so your rear end (buttocks) is higher than your head. This will decrease the pressure on the cord until help arrives.   [facility] your doctor now or seek immediate medical care if:    · You have signs of preeclampsia, such as:  ¨ Sudden swelling of your face, hands, or feet. ¨ New vision problems (such as dimness or blurring). ¨ A severe headache.     · You have any vaginal bleeding.     · You have belly pain or cramping.     · You have a fever.     · You have had regular contractions (with or without pain) for an hour. This means that you have 8 or more within 1 hour or 4 or more in 20 minutes after you change your position and drink fluids.     · You have a sudden release of fluid from your vagina.     · You have low back pain or pelvic pressure that does not go away.     · You notice that your baby has stopped moving or is moving much less than normal.    Watch closely for changes in your health, and be sure to contact your doctor if you have any problems. Where can you learn more? Go to http://remi-kannan.info/. Enter [id number] in the search box to learn more about \"Pregnancy Precautions: Care Instructions. \"  Current as of: November 21, 2017  Content Version: 11.7  © 8188-7558 [employer], [employer]. Care instructions adapted under license by [employer] (which disclaims liability or warranty for this information). If you have questions about a medical condition or this instruction, always ask your healthcare professional. [address] any warranty or liability for your use of this information.

## 2018-07-26 NOTE — H&P
History & Physical    Name: Nicole Chance MRN: [id number]  SSN: [id number]    YOB: 1985  Age: 29 y.o. Sex: female        Subjective:     Estimated Date of Delivery: 18  OB History      Para Term  AB Living    2 1 1 0 0 1    SAB TAB Ectopic Molar Multiple Live Births    0 0 0  1 1        Obstetric Comments    PPH             Ms. Conrado Iqbal is admitted with pregnancy at 30w4d for s/p fall. Missed step and fell on side/hip at 8am.  Good FM, no vb/vd/lof. Called office and advised to go to L and D for monitoring. No abd pain, no contractions, no direct abd trauma. Prenatal course was normal. Please see prenatal records for details. Past Medical History:   Diagnosis Date    Acne     Contusion of left knee 2013    Eczema 2013    GERD (gastroesophageal reflux disease) 2015    Migraine 2013    Right knee meniscal tear     resolved w physical therapy. BBall injury    Right wrist fracture , 2000    x2    Scoliosis     mild    Screening for cervical cancer 2010, 3/8/2012    normal. Dr. Nick Basilio     No past surgical history on file.   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     Social History Main Topics    Smoking status: Never Smoker    Smokeless tobacco: Never Used    Alcohol use 0.0 oz/week     1 - 2 Glasses of wine per week    Drug use: No    Sexual activity: Yes     Partners: Male     Birth control/ protection: None     Family History   Problem Relation Age of Onset    Hypertension Mother     Hypertension Father     High Cholesterol Father     Asthma Sister     Cancer Maternal Grandmother      colon    Diabetes Paternal Grandfather      type 2    Heart Attack Paternal Grandfather      fatal       Allergies   Allergen Reactions    Bactrim [Sulfamethoprim] Hives    Ceftin [Cefuroxime Axetil] Hives    Gardasil [H Papillomavirus Vac,Qval (Pf)] Other (comments) syncope     Prior to Admission medications    Medication Sig Start Date End Date Taking? Authorizing Provider   ferrous sulfate (SLOW RELEASE IRON PO) Take  by mouth. Yes Historical Provider   butalbital-acetaminophen-caffeine (FIORICET, ESGIC) -40 mg per tablet Take 1 Tab by mouth every four (4) hours as needed for Pain. 6/27/18  Yes Mishel Seo MD   raNITIdine (ZANTAC 75) 75 mg tablet Take 75 mg by mouth two (2) times a day. Yes Historical Provider   hydrocortisone (ANUCORT-HC) 25 mg supp Insert 1 Suppository into rectum every twelve (12) hours for 14 days. 7/25/18 8/8/18  Mishel Seo MD        There are no active hospital problems to display for this patient. Review of Systems: A comprehensive review of systems was negative except for that written in the HPI. Constitutional: negative for fevers, chills and weight loss  ENT ROS: negative for - hearing change, oral lesions or visual changes  Respiratory: negative for cough, wheezing or dyspnea on exertion  Cardiovascular: negative for chest pain, irregular heart beats, exertional chest pressure/discomfort  Gastrointestinal: negative for dysphagia, nausea and vomiting  Genito-Urinary ROS: see HPI  Inteument/breast: negative for rash, breast lump and nipple discharge  Musculoskeletal: s/p fall, lac on left leg  Endocrine ROS: negative for - breast changes, galactorrhea or temperature intolerance  Hematological and Lymphatic ROS: negative for - bruising or swollen lymph nodes          Objective:     Vitals:  Vitals:    07/26/18 1112 07/26/18 1127   BP:  131/68   Pulse:  90   Weight: 171 lb (77.6 kg)    Height: 5' 7\" (1.702 m)           Physical Exam:  Patient without distress.   Heart: Regular rate and rhythm or S1S2 present  Lung: clear to auscultation throughout lung fields, no wheezes, no rales, no rhonchi and normal respiratory effort  Abdomen: soft, nontender  Fundus: soft and non tender  Cervical Exam: not checked   Lower Extremities: no c/t/   RLE: left knee bandages, superficial abrasion left ankle  Membranes:  Intact       Prenatal Labs:   Lab Results   Component Value Date/Time    Rubella, External immune 2018    GrBStrep, External Negative 2015    HBsAg, External neg 2018    HIV, External neg 2018    Gonorrhea, External neg 2018    Chlamydia, External neg 2018        Assessment/Plan:   29 y.o.  30w4d  S/p fall  GBS unknown  Reassuring fetal surveillance  Rh pos    Plan: Admit for s/p fall, prolonged monitoring. CEFM/TOCO  K-B  ? Check on options to repair lac    PCP: does not suture  ER: can't see pt  General surgeon: will see pt: consult entered    Signed By:  Brit Bautista MD     2018         NST Inpatient procedure note    Heath Wu presents for fetal non-stress test.  LMP was on   Indication is reactive. She is 30w4d. She has been monitored for >60 minutes. The FHR was reactive. NST Interpretation:    FHR baseline 120 bpm, variability moderate. Accelerations present. Decelerations Absent. Uterine contractions were present, then irritability and resolved with po hydration    Assessment    NST is reactive. NST is reassuring. Patient does need admission/observation for further monitoring. Opal Strange was informed of the NST results and her questions were answered.     Plan:    observe for monitoring for s/p fall          Addendum:  Pt seen by gen surgeon: knee lac repaired      Will wait for K-B another hour  If neg: disch home  If pos, monitor overnight until 8am CEFM

## 2018-07-26 NOTE — CONSULTS
Consult    Subjective:     Ayaan Baca is a 29 y.o.  female who is being seen for a laceration on the left knee. Patient fell down 3 to 5 steps striking her knee on carpeted floor. She states she did not hit her head or abdomen in the fall. She describes a large amount of blood loss from the wound. She is currently in L&D for fetal monitoring. She states pain is controlled    Past Medical History:   Diagnosis Date    Acne     Contusion of left knee 11/6/2013    Eczema 11/6/2013    GERD (gastroesophageal reflux disease) 1/27/2015    Migraine 11/6/2013    Right knee meniscal tear     resolved w physical therapy. BBall injury    Right wrist fracture 1997, 2000    x2    Scoliosis     mild    Screening for cervical cancer 2/2010, 3/8/2012    normal. Dr. Christiane Callejas      No past surgical history on file. Family History   Problem Relation Age of Onset    Hypertension Mother     Hypertension Father     High Cholesterol Father     Asthma Sister     Cancer Maternal Grandmother      colon    Diabetes Paternal Grandfather      type 2    Heart Attack Paternal Grandfather      fatal      Social History   Substance Use Topics    Smoking status: Never Smoker    Smokeless tobacco: Never Used    Alcohol use 0.0 oz/week     1 - 2 Glasses of wine per week       Current Facility-Administered Medications   Medication Dose Route Frequency Provider Last Rate Last Dose    acetaminophen (TYLENOL) tablet 650 mg  650 mg Oral Q4H PRN Gail Fierro MD            Allergies   Allergen Reactions    Bactrim [Sulfamethoprim] Hives    Ceftin [Cefuroxime Axetil] Hives    Gardasil [H Papillomavirus Vac,Qval (Pf)] Other (comments)     syncope        Review of Systems:  A comprehensive review of systems was negative except for that written in the History of Present Illness.      Objective:     Physical Exam:   Visit Vitals    /68    Pulse 90    Ht 5' 7\" (1.702 m)    Wt 171 lb (77.6 kg)    LMP 12/24/2017 (Exact Date)    BMI 26.78 kg/m2     General appearance: alert, cooperative, no distress, appears stated age  Head: Normocephalic, without obvious abnormality, atraumatic  Eyes: conjunctivae/corneas clear. , EOM's intact. Extremities: 4 cm transverse laceration above her knee with exposed fat. Bleeding controlled. Abrasions over the knee and at the lateral ankle      Assessment:     29year old with full thickness laceration on the knee wound benefit from repair     Plan:     Ice 30 minutes on and off throughout the day.     Activities as tolerated   Can shower  Tylenol for pain     Signed By: Korin Munoz MD     July 26, 2018

## 2018-07-26 NOTE — PROGRESS NOTES
07/26/18 11:53 AM  CM met with patient to complete initial assessment and to begin discharge planning. Demographics were reviewed and confirmed. Patient and her /FOB Zain Reardon ([phone]) live together and have a 1year old daughter. Patient works at [employer] at [employer] and will have 13 weeks off from work. FOB works and will have 2 weeks off. Patient's parents live close and will be the couple's main support. Patient plans to breastfeed and has a pump to use at home. Dr. Jordyn Vazquez at [address] [facility] office) will provide medical follow up for the baby. Patient has car seat, crib, clothing, and other necessary supplies. Denied need for [facility] and Medicaid services. Patient fell this morning down the stairs. Plan is to monitor for 4 hours and discharge home if results are stable. FOB to drive home at discharge. Obs status discussed and letter provided; no questions or concerns. Care Management Interventions  PCP Verified by CM:  Yes  Mode of Transport at Discharge: [address] (CM Consult): Discharge Planning  Current Support Network: Lives with Spouse, Family Lives Nearby  Confirm Follow Up Transport: Family  Plan discussed with Pt/Family/Caregiver: Yes  Discharge Location  Discharge Placement: Home with outpatient services  OLAF Evans

## 2018-07-26 NOTE — PROGRESS NOTES
Pt presents for evaluation and monitoring after falling down 3-5 steps this morning. Pt states she was descending the steps, missed one and fell to left knee then slid down remaining few steps (3-5) on left side of body. Abrasions (\"rug burns\" from carpeted steps) noted left ankle, left knee (left knee was split open, bandaged by Pt mother who is retired RN with gauze pads and self adherent bandage. No ointment)  Pt states this event occurred at approximately 0800. EFM applied, US RLQ, pt in supine position. Pt denies leakage of fluids, denies bleeding, spotting, , cramping, luther; pt verbalizes positive fetal movements. 1200:  Return call from Dr Patty Dancer orders received for pain management, regular diet; orders input,  Menu provided. 1225:  Pt up to and from BR with assistance from [employer], returns to bed at 1230, EFM adjusted  1305:  Dr Patty Dancer at bedside, assessment performed and plan of care discussed. 1335:  KB lab drawn and sent to lab for evaluation. Lunch tray delivered, pt assisted to position of comfort to eat, maintain monitoring. 1336:  Pt up to BR, with assistance  1340:  Pt returned to ed, EFM connected, pt now eating lunch  1430:  Purposeful round, TOCO adjusted  1445:  Call from Dr Patty Dancer to notify RN Dr Gabe Robison from general surgery will be in to evaluate laceration, please have knee ready to view. Unsure of when Dr will be in to see pt.  1500:  Pt updated, advised general surgeon would be rounding on pt, ice water replenished, TOCO and FHR monitors adjusted. 3592-2130:  Dr Gabe Robison at bedside, laceration to left knee exposed by this RN (bandage cut from knee). Laceration cleansed, approximated, sutured, and covered with Dermabond skin glue by Dr Debbie Grover under strile technique. S/O providing emotional support and distraction throughout procedure. Pt tolerated procedure well. 1610:  Dr Patty Dancer to bedside. 1705:   Follow up with [facility] lab, K-B result due around 7 PM tonight, will call if results finalize sooner. Page placed and return call from Dr Adela Urbano to advise about lab results. 1711:  BP checked at Dr Adela Urbano request 135/75, return page and call. Dr Adela Urbano to sign out for the night, Dr Funez Speaker to be notified when results are returned. 1715: Ice pad to knee for comfort  1750:  Spouse returns to room with dinner  1815:  Purposeful round, pt declines offers for interventions at this time  1820:  K-B negative, reviewed with Dr Funez Speaker, Pt clear for discharge  1850:  VSS, Discharge instructions reviewed with pt and s/o, opportunity for questions and clarifications provided. AVS signed for chart, copy given to pt for reference. F/U appointments previously scheduled with Dr Adela Urbano.

## 2018-07-26 NOTE — PROCEDURES
Procedure Note      Preoperative Diagnosis: Laceration of left knee    Postoperative Diagnosis: Laceration of left knee     Procedure:  Intermediate repair of a 4 cm laceration    Surgeon:  Neldon Canavan, MD     Assistant:  none    Anesthesia: Local     Procedure Details:      Chart reviewed for the following:   Timmy De La Rosa MD, have reviewed the History, Physical and updated the Allergic reactions for Ausra performed immediately prior to start of procedure:   Timmy De La Rosa MD, have performed the following reviews on [facility] prior to the start of the procedure:            * Patient was identified by name and date of birth   * Agreement on procedure being performed was verified  * Risks and Benefits explained to the patient  * Procedure site verified and marked as necessary  * Patient was positioned for comfort  * Consent was signed and verified     Time: 3:30    Date of procedure: 7/26/2018    The risks,benefits and alternatives  were explained and consent was obtained for the procedure. The area was cleansed with Betadine and draped in the usual manner. Local anesthesia with 1% lidocaine  was infiltrated into the skin surrounding the laceration. The wound was explored and there were no visible foreign bodies. The wound was then closed in two layers. 3-0 vicryl for a deep layer and 4-0 for the skin. Dermabond was applied. Estimated Blood Loss:  minimal    Specimens: none           Complications:  None; patient tolerated the procedure well.            Condition: stable           Signed By: Neldon Canavan, MD

## 2018-07-27 NOTE — PROGRESS NOTES
Normal results, add to prenatal records. We can review in detail with patient at next visit. Add to PL \"neg k-b\" w date

## 2018-07-30 ENCOUNTER — TELEPHONE (OUTPATIENT)
Dept: OBGYN CLINIC | Age: 33
End: 2018-07-30

## 2018-07-30 NOTE — TELEPHONE ENCOUNTER
Ryan Mathis at [facility] left  on [facility]HOMI Triage line stating that the Anucort Suppositories that was sent is not covered under her insurance and is too expensive. Ryan Mathis is requesting a rx for Hydrocortisone 2% cream that is covered by insurance. Ok to switch?

## 2018-08-08 ENCOUNTER — ROUTINE PRENATAL (OUTPATIENT)
Dept: OBGYN CLINIC | Age: 33
End: 2018-08-08

## 2018-08-08 VITALS
WEIGHT: 172 LBS | DIASTOLIC BLOOD PRESSURE: 70 MMHG | BODY MASS INDEX: 27 KG/M2 | HEIGHT: 67 IN | SYSTOLIC BLOOD PRESSURE: 118 MMHG

## 2018-08-08 DIAGNOSIS — Z34.80 ENCOUNTER FOR SUPERVISION OF OTHER NORMAL PREGNANCY, UNSPECIFIED TRIMESTER: Primary | ICD-10-CM

## 2018-08-08 RX ORDER — HYDROCORTISONE 2.5% 25 MG/G
CREAM TOPICAL
Refills: 0 | COMMUNITY
Start: 2018-07-31 | End: 2019-10-22

## 2018-08-08 NOTE — PATIENT INSTRUCTIONS
Weeks 32 to 34 of Your Pregnancy: Care Instructions  Your Care Instructions    During the last few weeks of your pregnancy, you may have more aches and pains. It's important to rest when you can. Your growing baby is putting more pressure on your bladder. So you may need to urinate more often. Hemorrhoids are also common. These are painful, itchy veins in the rectal area. In the 36th week, most women have a test for group B streptococcus (GBS). GBS is a common bacteria that can live in the vagina and rectum. It can make your baby sick after birth. If you test positive, you will get antibiotics during labor. These will keep your baby from getting the bacteria. You may want to talk with your doctor about banking your baby's umbilical cord blood. This is the blood left in the cord after birth. If you want to save this blood, you must arrange it ahead of time. You can't decide at the last minute. If you haven't already had the Tdap shot during this pregnancy, talk to your doctor about getting it. It will help protect your  against pertussis infection. Follow-up care is a key part of your treatment and safety. Be sure to make and go to all appointments, and call your doctor if you are having problems. It's also a good idea to know your test results and keep a list of the medicines you take. How can you care for yourself at home? Ease hemorrhoids  · Get more liquids, fruits, vegetables, and fiber in your diet. This will help keep your stools soft. · Avoid sitting for too long. Lie on your left side several times a day. · Clean yourself with soft, moist toilet paper. Or you can use witch hazel pads or personal hygiene pads. · If you are uncomfortable, try ice packs. Or you can sit in a warm sitz bath. Do these for 20 minutes at a time, as needed. · Use hydrocortisone cream for pain and itching. Two examples are Anusol and Preparation H Hydrocortisone.   · Ask your doctor about taking an over-the-counter stool softener. Consider breastfeeding  · Experts recommend that women breastfeed for 1 year or longer. Breast milk is the perfect food for babies. · Breast milk is easier for babies to digest than formula. And it is always available, just the right temperature, and free. · Breast milk may help protect your child from some health problems.  babies are less likely than formula-fed babies to:  ¨ Get ear infections, colds, diarrhea, and pneumonia. ¨ Be obese or get diabetes later in life. · Women who breastfeed have less bleeding after the birth. Their uteruses also shrink back faster. · Some women who breastfeed lose weight faster. Making milk burns calories. · Breastfeeding can lower your risk of breast cancer, ovarian cancer, and osteoporosis. Decide about circumcision for boys  · As you make this decision, it may help to think about your personal, Episcopalian, and family traditions. You get to decide if you will keep your son's penis natural or if he will be circumcised. · If you decide that you would like to have your baby circumcised, talk with your doctor. You can share your concerns about pain. And you can discuss your preferences for anesthesia. Where can you learn more? Go to http://remi-kannan.info/. Enter G995 in the search box to learn more about \"Weeks 32 to 34 of Your Pregnancy: Care Instructions. \"  Current as of: November 21, 2017  Content Version: 11.7  © 8272-6358 [employer]. Care instructions adapted under license by [employer] (which disclaims liability or warranty for this information). If you have questions about a medical condition or this instruction, always ask your healthcare professional. [place] [address] any warranty or liability for your use of this information.

## 2018-08-08 NOTE — PROGRESS NOTES
_ [facility]  [email or web address]  [phone]    Carrol Gaines MD, FACOG     Follow-up OB visit    Chief Complaint   Patient presents with    Routine Prenatal Visit       Vitals:    18 1117   BP: 118/70   Weight: 172 lb (78 kg)   Height: 5' 7\" (1.702 m)       Patient Active Problem List    Diagnosis Date Noted    Pregnancy 2018    Low lying placenta nos or without hemorrhage, third trimester 2018    Encounter for supervision of other normal pregnancy, unspecified trimester 2018    Scoliosis     Normal labor and delivery 2015    GERD (gastroesophageal reflux disease) 2015    Migraine 2013    Eczema 2013    Contusion of left knee 2013    Acne         The patient reports the following concerns: increased pelvic pressure. Knee healing well after fall  See PN flowsheet for exam    29 y.o.  32w3d   Encounter Diagnosis   Name Primary?  Encounter for supervision of other normal pregnancy, unspecified trimester Yes        [] SAB/bleeding precautions reviewed   [x] PTL/PPROM precautions reviewed   [] Labor precautions reviewed   [] Fetal kick counts discussed   [] Labs reviewed with patient   [] [place] precautions reviewed   [] Consent reviewed   [] Handouts given to pt   [] Glucola handout    [] GBS/labor/Magic Hour handout   []    [] We reviewed CDC recommendations for Tdap for patient and close contacts and RBA of receiving in pregnancy, advised obtaining in third trimester   [] Reviewed healthy nutrition in pregnancy and good exercise practices   [] We disc safer medications in pregnancy and referred patient to [facility] resources   [] We reviewed CDC recommendations for flu vaccine and RBA of receiving in pregnancy   []    []    []         Follow-up Disposition:  Return in about 2 weeks (around 2018) for Follow up OB visit. No orders of the defined types were placed in this encounter.       Carrol Gaines MD

## 2018-08-21 ENCOUNTER — ROUTINE PRENATAL (OUTPATIENT)
Dept: OBGYN CLINIC | Age: 33
End: 2018-08-21

## 2018-08-21 VITALS
HEIGHT: 67 IN | DIASTOLIC BLOOD PRESSURE: 74 MMHG | WEIGHT: 174 LBS | BODY MASS INDEX: 27.31 KG/M2 | SYSTOLIC BLOOD PRESSURE: 116 MMHG

## 2018-08-21 DIAGNOSIS — N89.8 VAGINAL DISCHARGE DURING PREGNANCY, ANTEPARTUM: ICD-10-CM

## 2018-08-21 DIAGNOSIS — O26.899 VAGINAL DISCHARGE DURING PREGNANCY, ANTEPARTUM: ICD-10-CM

## 2018-08-21 DIAGNOSIS — Z34.80 ENCOUNTER FOR SUPERVISION OF OTHER NORMAL PREGNANCY, UNSPECIFIED TRIMESTER: Primary | ICD-10-CM

## 2018-08-21 LAB
FERN TEST, (POC): NORMAL
WET MOUNT POCT, WMPOCT: NORMAL

## 2018-08-21 RX ORDER — RANITIDINE 150 MG/1
150 TABLET, FILM COATED ORAL 2 TIMES DAILY
COMMUNITY
End: 2018-09-19

## 2018-08-21 NOTE — MR AVS SNAPSHOT
[address] [facility] [address] [facility] 
[phone] Patient: Suyapa Paige MRN: [id number] Visit Information Date & Time Provider Department Dept. Phone Encounter #  
 8/21/2018 10:30 AM MD Brodie Weir [phone] [id number]  
  
 9/4/2018  9:50 AM  
OB VISIT with MD Brodie Weir ([address]) Appt Note: 2wk fob  36wk  GBS   TP  
 [address] [facility] [address] [address]  
[address] [facility] Upcoming Health Maintenance Date Due Influenza Age 5 to Adult 8/1/2018 PAP AKA CERVICAL CYTOLOGY 2/21/2021 Allergies as of 8/21/2018  Review Complete On: 8/21/2018 By: Griselda Jensen LPN Severity Noted Reaction Type Reactions Bactrim [Sulfamethoprim]  03/24/2018    Hives Ceftin [Cefuroxime Axetil]  11/30/2010    Hives Gardasil [H Papillomavirus Vac,qval (Pf)]  09/07/2012    Other (comments)  
 syncope Current Immunizations  Reviewed on 10/16/2017 Name Date Hep A Vaccine (Adult) 1/30/2014 Human Papillomavirus 1/1/2006 Influenza Vaccine 8/19/2017, 9/16/2014, 8/24/2013 TDAP Vaccine 9/7/2012 Tdap 7/25/2018, 4/16/2015 Not reviewed this visit Vitals BP Height(growth percentile) Weight(growth percentile) LMP BMI OB Status 116/74 5' 7\" (1.702 m) 174 lb (78.9 kg) 12/24/2017 (Exact Date) 27.25 kg/m2 Pregnant Smoking Status Never Smoker BMI and BSA Data Body Mass Index Body Surface Area  
 27.25 kg/m 2 1.93 m 2 [facility] [address]  [facility] [phone] Your Updated Medication List  
  
   
 This list is accurate as of 18 10:31 AM.  Always use your most recent med list.  
  
  
  
  
 butalbital-acetaminophen-caffeine -40 mg per tablet Commonly known as:  Carmell Lali Take 1 Tab by mouth every four (4) hours as needed for Pain. PROCTOZONE-HC 2.5 % rectal cream  
Generic drug:  hydrocortisone APPLY RECTALLY Q 12 H  
  
 SLOW RELEASE IRON PO Take  by mouth. * ZANTAC 75 75 mg tablet Generic drug:  raNITIdine Take 75 mg by mouth two (2) times a day. * ZANTAC 150 mg tablet Generic drug:  raNITIdine Take 150 mg by mouth two (2) times a day. * Notice: This list has 2 medication(s) that are the same as other medications prescribed for you. Read the directions carefully, and ask your doctor or other care provider to review them with you. Patient Instructions Weeks 34 to 36 of Your Pregnancy: Care Instructions Your Care Instructions By now, your baby and your belly have grown quite large. It is almost time to give birth. A full-term pregnancy can deliver between 37 and 42 weeks. Your baby's lungs are almost ready to breathe air. The bones in your baby's head are now firm enough to protect it, but soft enough to move down through the birth canal. 
You may feel excited, happy, anxious, or scared. You may wonder how you will know if you are in labor or what to expect during labor. Try to be flexible in your expectations of the birth. Because each birth is different, there is no way to know exactly what childbirth will be like for you. This care sheet will help you know what to expect and how to prepare. This may make your childbirth easier. If you haven't already had the Tdap shot during this pregnancy, talk to your doctor about getting it. It will help protect your  against pertussis infection. In the 36th week, most women have a test for group B streptococcus (GBS). GBS is a common bacteria that can live in the vagina and rectum. It can make your baby sick after birth. If you test positive, you will get antibiotics during labor. The medicine will keep your baby from getting the bacteria. Follow-up care is a key part of your treatment and safety. Be sure to make and go to all appointments, and call your doctor if you are having problems. It's also a good idea to know your test results and keep a list of the medicines you take. How can you care for yourself at home? Learn about pain relief choices · Pain is different for every woman. Talk with your doctor about your feelings about pain. · You can choose from several types of pain relief. These include medicine or breathing techniques, as well as comfort measures. You can use more than one option. · If you choose to have pain medicine during labor, talk to your doctor about your options. Some medicines lower anxiety and help with some of the pain. Others make your lower body numb so that you won't feel pain. · Be sure to tell your doctor about your pain medicine choice before you start labor or very early in your labor. You may be able to change your mind as labor progresses. · Rarely, a woman is put to sleep by medicine given through a mask or an IV. Labor and delivery · The first stage of labor has three parts: early, active, and transition. ¨ Most women have early labor at home. You can stay busy or rest, eat light snacks, drink clear fluids, and start counting contractions. ¨ When talking during a contraction gets hard, you may be moving to active labor. During active labor, you should head for the hospital if you are not there already. ¨ You are in active labor when contractions come every 3 to 4 minutes and last about 60 seconds. Your cervix is opening more rapidly. ¨ If your water breaks, contractions will come faster and stronger.  
¨ During transition, your cervix is stretching, and contractions are coming more rapidly. ¨ You may want to push, but your cervix might not be ready. Your doctor will tell you when to push. · The second stage starts when your cervix is completely opened and you are ready to push. ¨ Contractions are very strong to push the baby down the birth canal. 
¨ You will feel the urge to push. You may feel like you need to have a bowel movement. ¨ You may be coached to push with contractions. These contractions will be very strong, but you will not have them as often. You can get a little rest between contractions. ¨ You may be emotional and irritable. You may not be aware of what is going on around you. ¨ One last push, and your baby is born. · The third stage is when a few more contractions push out the placenta. This may take 30 minutes or less. · The fourth stage is the welcome recovery. You may feel overwhelmed with emotions and exhausted but alert. This is a good time to start breastfeeding. Where can you learn more? Go to http://remi-kannan.info/. Enter V872 in the search box to learn more about \"Weeks 34 to 36 of Your Pregnancy: Care Instructions. \" Current as of: November 21, 2017 Content Version: 11.7 © 5804-1656 [employer]. Care instructions adapted under license by Consumer Agent Portal ([employer]) (which disclaims liability or warranty for this information). If you have questions about a medical condition or this instruction, always ask your healthcare professional. [place] [address] any warranty or liability for your use of this information. Introducing [facility] [place] [facility]! Dear Anny Thomas: Thank you for requesting a [employer] account. Our records indicate that you already have an active [employer] account. You can access your account anytime at [email or web address] Did you know that you can access your hospital and ER discharge instructions at any time in [employer]?   You can also review all of your test results from your hospital stay or ER visit. Additional Information If you have questions, please visit the Frequently Asked Questions section of the [employer] website at [email or web address] Remember, [employer] is NOT to be used for urgent needs. For medical emergencies, dial 911. Now available from your iPhone and Android! Please provide this summary of care documentation to your next provider. Your primary care clinician is listed as Siva Brooks. If you have any questions after today's visit, please call [phone].

## 2018-08-21 NOTE — PROGRESS NOTES
[facility]  [email or web address]  [phone]    Gabrielle Patrick MD, FACOG       OB/GYN: OB Problem visit    Chief Complaint:   Chief Complaint   Patient presents with    Routine Prenatal Visit    Pregnancy Problem     discharge       Patient Active Problem List    Diagnosis    Pregnancy    Low lying placenta nos or without hemorrhage, third trimester    Encounter for supervision of other normal pregnancy, unspecified trimester     Declined genetic testing  GERD zantac  Nausea zofran  LLP, post, no longer previa <2cm, fu 34 wks (rafael ok)  18 1 hr gtt #136; 3 hr gtt 18 WNL-80, 131, 124, 78  Hgb 10.5 @ 29wks  neg k-b 18  MFM appt 18 @ 8:45am for elevated BP/anxiety--pt notified in office.  Scoliosis     mild      Normal labor and delivery    GERD (gastroesophageal reflux disease)    Migraine    Eczema    Contusion of left knee    Acne       History of Present Illness: The patient is a 29 y.o.  female who reports having increased clear vaginal discharge for 1 weeks. Denies itching/irritation/bleeding. Pt also c/o worsening heartburn that is causing her to vomit. Pt has upped her Zantac to 150mg twice/day without improvement. This is a new problem. This is a routinely scheduled OB appointment. She reports the symptoms are is unchanged. Aggravating factors include none. Alleviating factors include none. She does not have other concerns. [facility]  Past Medical History:   Diagnosis Date    Acne     Contusion of left knee 2013    Eczema 2013    GERD (gastroesophageal reflux disease) 2015    Migraine 2013    Right knee meniscal tear     resolved w physical therapy. BBall injury    Right wrist fracture , 2000    x2    Scoliosis     mild    Screening for cervical cancer 2010, 3/8/2012    normal. Dr. Sujata Taylor     No past surgical history on file.   Family History   Problem Relation Age of Onset    Hypertension Mother     Hypertension Father     High Cholesterol Father     Asthma Sister     Cancer Maternal Grandmother      colon    Diabetes Paternal Grandfather      type 2    Heart Attack Paternal Grandfather      fatal     Social History   Substance Use Topics    Smoking status: Never Smoker    Smokeless tobacco: Never Used    Alcohol use 0.0 oz/week     1 - 2 Glasses of wine per week     Allergies   Allergen Reactions    Bactrim [Sulfamethoprim] Hives    Ceftin [Cefuroxime Axetil] Hives    Gardasil [H Papillomavirus Vac,Qval (Pf)] Other (comments)     syncope     Current Outpatient Prescriptions   Medication Sig    raNITIdine (ZANTAC) 150 mg tablet Take 150 mg by mouth two (2) times a day.  ferrous sulfate (SLOW RELEASE IRON PO) Take  by mouth.  butalbital-acetaminophen-caffeine (FIORICET, ESGIC) -40 mg per tablet Take 1 Tab by mouth every four (4) hours as needed for Pain.  PROCTOZONE-HC 2.5 % rectal cream APPLY RECTALLY Q 12 H    raNITIdine (ZANTAC 75) 75 mg tablet Take 75 mg by mouth two (2) times a day. No current facility-administered medications for this visit.         Review of Systems:  History obtained from the patient and written ROS questionnaire  Constitutional: negative for fevers, chills and weight loss  ENT ROS: negative for - hearing change, oral lesions or visual changes  Respiratory: negative for cough, wheezing or dyspnea on exertion  Cardiovascular: negative for chest pain, irregular heart beats, exertional chest pressure/discomfort  Gastrointestinal: negative for dysphagia, nausea and vomiting  Genito-Urinary ROS: , see HPI  Inteument/breast: negative for rash, breast lump and nipple discharge  Musculoskeletal:negative for stiff joints, neck pain and muscle weakness  Endocrine ROS: negative for - breast changes, galactorrhea or temperature intolerance  Hematological and Lymphatic ROS: negative for - blood clots, bruising or swollen lymph nodes    Physical Exam:  Visit Vitals    /74    Ht 5' 7\" (1.702 m)    Wt 174 lb (78.9 kg)    BMI 27.25 kg/m2       GENERAL: alert, well appearing, and in no distress  HEAD; normocephalic, atraumatic  ABDOMEN: soft, nontender, nondistended, no masses or organomegaly   BACK: normal range of motion, no tenderness, no CVAT   EGBUS: no lesions, no inflammation, no masses  VULVA: normal appearing vulva with no masses, tenderness or lesions  VAGINA: normal appearing vagina with normal color, no lesions, thin discharge, no pooling  ntz neg  CERVIX: normal appearing cervix without discharge or lesions, non tender  UTERUS: uterus is enlarged in size, gravid appropriate for gestational age  ADNEXA: normal adnexa in size, nontender and no masses  NEURO: alert, oriented, normal speech    See PN flowsheet for additional notes and exam    Assessment:  29 y.o.  34w2d   Encounter Diagnoses   Name Primary?  Encounter for supervision of other normal pregnancy, unspecified trimester Yes    Vaginal discharge during pregnancy, antepartum        Plan:  An evaluation of this patient's concern is planned. The patient is advised that she should contact the office if she does not note improvement or if symptoms recur  She should contact our office with any questions or concerns  She could keep her routine OB appointment. We discussed potential causes of vaginal discharge/irritation. We discussed good vulvar hygiene. Recommended avoid vaginal irritants. Discussed use of mild soaps/detergents. Follow up if NI. We reviewed wet prep findings with the patient at her visit.   ROM precautions  Expectant management planned    Orders Placed This Encounter    AMB POC WET PREP (AKA STAIN, INTERPRET, WET MOUNT)    AMB POC FERN TEST       Results for orders placed or performed in visit on 18   AMB POC SMEAR, STAIN & INTERPRET, WET MOUNT   Result Value Ref Range    Wet mount (POC)      Narrative    MARIBELL    Hypae: negative  Buds: negative    Wet Prep:  Trich: negative  Clue cells: negative  Hyphae: negative  Buds: negative  WBC's: normal     AMB POC FERN TEST   Result Value Ref Range    FERN test, (POC)      Narrative    Negative no james Morataya MD

## 2018-08-21 NOTE — PATIENT INSTRUCTIONS

## 2018-09-07 ENCOUNTER — ROUTINE PRENATAL (OUTPATIENT)
Dept: OBGYN CLINIC | Age: 33
End: 2018-09-07

## 2018-09-07 VITALS — DIASTOLIC BLOOD PRESSURE: 76 MMHG | WEIGHT: 178 LBS | SYSTOLIC BLOOD PRESSURE: 118 MMHG | BODY MASS INDEX: 27.88 KG/M2

## 2018-09-07 DIAGNOSIS — Z34.03 ENCOUNTER FOR SUPERVISION OF NORMAL FIRST PREGNANCY IN THIRD TRIMESTER: Primary | ICD-10-CM

## 2018-09-07 LAB — GRBS, EXTERNAL: NEGATIVE

## 2018-09-09 LAB — GP B STREP DNA SPEC QL NAA+PROBE: NEGATIVE

## 2018-09-12 ENCOUNTER — ROUTINE PRENATAL (OUTPATIENT)
Dept: OBGYN CLINIC | Age: 33
End: 2018-09-12

## 2018-09-12 VITALS
HEIGHT: 67 IN | SYSTOLIC BLOOD PRESSURE: 118 MMHG | BODY MASS INDEX: 28.17 KG/M2 | WEIGHT: 179.5 LBS | DIASTOLIC BLOOD PRESSURE: 74 MMHG

## 2018-09-12 DIAGNOSIS — Z34.80 PRENATAL CARE OF MULTIGRAVIDA, ANTEPARTUM: Primary | ICD-10-CM

## 2018-09-12 RX ORDER — HYDROGEN PEROXIDE 3 %
SOLUTION, NON-ORAL MISCELLANEOUS DAILY
COMMUNITY
End: 2019-10-22

## 2018-09-12 NOTE — MR AVS SNAPSHOT
[address] [facility] [address] [facility] 
[phone] Patient: Heath Wu MRN: [id number] XYT:[birth year] Visit Information Date & Time Provider Department Dept. Phone Encounter #  
 9/12/2018  9:50 AM MD Brodie Meléndez [phone] [id number] 9/19/2018  9:50 AM  
OB VISIT with Brit Puna, MD  
[place] ([address]) Appt Note: ob  
 [address] [facility] [address] [place] [address]  
[phone]  
  
   
 [address] [address]  
  
    
 9/26/2018  9:40 AM  
OB VISIT with Brit Puna, MD  
[place] ([address]) Appt Note: ob  
 [address] [facility] [address] [place] [address]  
[phone] Upcoming Health Maintenance Date Due Influenza Age 5 to Adult 8/1/2018 PAP AKA CERVICAL CYTOLOGY 2/21/2021 Allergies as of 9/12/2018  Review Complete On: 9/12/2018 By: Sanam Toribio Severity Noted Reaction Type Reactions Bactrim [Sulfamethoprim]  03/24/2018    Hives Ceftin [Cefuroxime Axetil]  11/30/2010    Hives Gardasil [H Papillomavirus Vac,qval (Pf)]  09/07/2012    Other (comments)  
 syncope Current Immunizations  Reviewed on 10/16/2017 Name Date Hep A Vaccine (Adult) 1/30/2014 Human Papillomavirus 1/1/2006 Influenza Vaccine 8/19/2017, 9/16/2014, 8/24/2013 TDAP Vaccine 9/7/2012 Tdap 7/25/2018, 4/16/2015 Not reviewed this visit Vitals BP Height(growth percentile) Weight(growth percentile) LMP BMI OB Status 118/74 5' 7\" (1.702 m) 179 lb 8 oz (81.4 kg) 12/24/2017 (Exact Date) 28.11 kg/m2 Pregnant Smoking Status Never Smoker BMI and BSA Data Body Mass Index Body Surface Area  
 28.11 kg/m 2 1.96 m 2 Preferred Pharmacy Pharmacy Name Phone [facility] [address]  [facility] [phone] Your Updated Medication List  
  
   
This list is accurate as of 9/12/18 10:24 AM.  Always use your most recent med list.  
  
  
  
  
 butalbital-acetaminophen-caffeine -40 mg per tablet Commonly known as:  Markel Player Take 1 Tab by mouth every four (4) hours as needed for Pain. PROCTOZONE-HC 2.5 % rectal cream  
Generic drug:  hydrocortisone APPLY RECTALLY Q 12 H  
  
 SLOW RELEASE IRON PO Take  by mouth. * ZANTAC 75 75 mg tablet Generic drug:  raNITIdine Take 75 mg by mouth two (2) times a day. * ZANTAC 150 mg tablet Generic drug:  raNITIdine Take 150 mg by mouth two (2) times a day. * Notice: This list has 2 medication(s) that are the same as other medications prescribed for you. Read the directions carefully, and ask your doctor or other care provider to review them with you. Patient Instructions Week 37 of Your Pregnancy: Care Instructions Your Care Instructions You are near the end of your pregnancy-and you're probably pretty uncomfortable. It may be harder to walk around. Lying down probably isn't comfortable either. You may have trouble getting to sleep or staying asleep. Most women deliver their babies between 40 and 41 weeks. This is a good time to think about packing a bag for the hospital with items you'll need. Then you'll be ready when labor starts. Follow-up care is a key part of your treatment and safety. Be sure to make and go to all appointments, and call your doctor if you are having problems. It's also a good idea to know your test results and keep a list of the medicines you take. How can you care for yourself at home? Learn about breastfeeding · Breastfeeding is best for your baby and good for you. · Breast milk has antibodies to help your baby fight infections. · Mothers who breastfeed often lose weight faster, because making milk burns calories. · Learning the best ways to hold your baby will make breastfeeding easier. · Let your partner bathe and diaper the baby to keep your partner from feeling left out. Snuggle together when you breastfeed. · You may want to learn how to use a breast pump and store your milk. · If you choose to bottle feed, make the feeding feel like breastfeeding so you can bond with your baby. Always hold your baby and the bottle. Do not prop bottles or let your baby fall asleep with a bottle. Learn about crying · It is common for babies to cry for 1 to 3 hours a day. Some cry more, some cry less. · Babies don't cry to make you upset or because you are a bad parent. · Crying is how your baby communicates. Your baby may be hungry; have gas; need a diaper change; or feel cold, warm, tired, lonely, or tense. Sometimes babies cry for unknown reasons. · If you respond to your baby's needs, he or she will learn to trust you. · Try to stay calm when your baby cries. Your baby may get more upset if he or she senses that you are upset. Know how to care for your  · Your baby's umbilical cord stump will drop off on its own, usually between 1 and 2 weeks. To care for your baby's umbilical cord area: ¨ Clean the area at the bottom of the cord 2 or 3 times a day. ¨ Pay special attention to the area where the cord attaches to the skin. ¨ Keep the diaper folded below the cord. ¨ Use a damp washcloth or cotton ball to sponge bathe your baby until the stump has come off. · Your baby's first dark stool is called meconium. After the meconium is passed, your baby will develop his or her own bowel pattern. ¨ Some babies, especially  babies, have several bowel movements a day. Others have one or two a day, or one every 2 to 3 days. ¨  babies often have loose, yellow stools. Formula-fed babies have more formed stools. ¨ If your baby's stools look like little pellets, he or she is constipated. After 2 days of constipation, call your baby's doctor. · If your baby will be circumcised, you can care for him at home. ¨ Gently rinse his penis with warm water after every diaper change. Do not try to remove the film that forms on the penis. This film will go away on its own. Pat dry. ¨ Put petroleum ointment, such as Vaseline, on the area of the diaper that will touch your baby's penis. This will keep the diaper from sticking to your baby. ¨ Ask the doctor about giving your baby acetaminophen (Tylenol) for pain. Where can you learn more? Go to http://remi-kannan.info/. Enter 68 21 97 in the search box to learn more about \"Week 37 of Your Pregnancy: Care Instructions. \" Current as of: November 21, 2017 Content Version: 11.7 © 5724-9347 [employer]. Care instructions adapted under license by [employer] (which disclaims liability or warranty for this information). If you have questions about a medical condition or this instruction, always ask your healthcare professional. [place] [address] any warranty or liability for your use of this information. Introducing [facility]! Dear Jacinta [place]: Thank you for requesting a [employer] account. Our records indicate that you already have an active [employer] account. You can access your account anytime at [email or web address] Did you know that you can access your hospital and ER discharge instructions at any time in [employer]? You can also review all of your test results from your hospital stay or ER visit. Additional Information If you have questions, please visit the Frequently Asked Questions section of the [employer] website at [email or web address] Remember, [employer] is NOT to be used for urgent needs. For medical emergencies, dial 911. Now available from your iPhone and Android! Please provide this summary of care documentation to your next provider. Your primary care clinician is listed as Maureen Denny. If you have any questions after today's visit, please call [phone].

## 2018-09-12 NOTE — PROGRESS NOTES
[facility]  [email or web address]  [phone]    Darryle Silvius, MD, FACOG     Follow-up OB visit    Chief Complaint   Patient presents with    Routine Prenatal Visit       Vitals:    18 1023   BP: 118/74   Weight: 179 lb 8 oz (81.4 kg)   Height: 5' 7\" (1.702 m)       Patient Active Problem List    Diagnosis Date Noted    Pregnancy 2018    Low lying placenta nos or without hemorrhage, third trimester 2018    Encounter for supervision of other normal pregnancy, unspecified trimester 2018    Scoliosis     Normal labor and delivery 2015    GERD (gastroesophageal reflux disease) 2015    Migraine 2013    Eczema 2013    Contusion of left knee 2013    Acne         The patient reports the following concerns: none    See PN flowsheet for exam    29 y.o.  37w3d   Encounter Diagnosis   Name Primary?  Prenatal care of multigravida, antepartum Yes        [] SAB/bleeding precautions reviewed   [] PTL/PPROM precautions reviewed   [] Labor precautions reviewed   [] Fetal kick counts discussed   [] Labs reviewed with patient   [] Hussain Nelly precautions reviewed   [] Consent reviewed   [] Handouts given to pt   [] Glucola handout    [] GBS/labor/Magic Hour handout   []    [] We reviewed CDC recommendations for Tdap for patient and close contacts and RBA of receiving in pregnancy, advised obtaining in third trimester   [] Reviewed healthy nutrition in pregnancy and good exercise practices   [] We disc safer medications in pregnancy and referred patient to [facility] resources   [] We reviewed CDC recommendations for flu vaccine and RBA of receiving in pregnancy   []    []    []         Follow-up Disposition: Not on File    No orders of the defined types were placed in this encounter.       Darryle Silvius, MD

## 2018-09-12 NOTE — PATIENT INSTRUCTIONS
Week 37 of Your Pregnancy: Care Instructions  Your Care Instructions    You are near the end of your pregnancy-and you're probably pretty uncomfortable. It may be harder to walk around. Lying down probably isn't comfortable either. You may have trouble getting to sleep or staying asleep. Most women deliver their babies between 40 and 41 weeks. This is a good time to think about packing a bag for the hospital with items you'll need. Then you'll be ready when labor starts. Follow-up care is a key part of your treatment and safety. Be sure to make and go to all appointments, and call your doctor if you are having problems. It's also a good idea to know your test results and keep a list of the medicines you take. How can you care for yourself at home? Learn about breastfeeding  · Breastfeeding is best for your baby and good for you. · Breast milk has antibodies to help your baby fight infections. · Mothers who breastfeed often lose weight faster, because making milk burns calories. · Learning the best ways to hold your baby will make breastfeeding easier. · Let your partner bathe and diaper the baby to keep your partner from feeling left out. Snuggle together when you breastfeed. · You may want to learn how to use a breast pump and store your milk. · If you choose to bottle feed, make the feeding feel like breastfeeding so you can bond with your baby. Always hold your baby and the bottle. Do not prop bottles or let your baby fall asleep with a bottle. Learn about crying  · It is common for babies to cry for 1 to 3 hours a day. Some cry more, some cry less. · Babies don't cry to make you upset or because you are a bad parent. · Crying is how your baby communicates. Your baby may be hungry; have gas; need a diaper change; or feel cold, warm, tired, lonely, or tense. Sometimes babies cry for unknown reasons. · If you respond to your baby's needs, he or she will learn to trust you.   · Try to stay calm when your baby cries. Your baby may get more upset if he or she senses that you are upset. Know how to care for your   · Your baby's umbilical cord stump will drop off on its own, usually between 1 and 2 weeks. To care for your baby's umbilical cord area:  ¨ Clean the area at the bottom of the cord 2 or 3 times a day. ¨ Pay special attention to the area where the cord attaches to the skin. ¨ Keep the diaper folded below the cord. ¨ Use a damp washcloth or cotton ball to sponge bathe your baby until the stump has come off. · Your baby's first dark stool is called meconium. After the meconium is passed, your baby will develop his or her own bowel pattern. ¨ Some babies, especially  babies, have several bowel movements a day. Others have one or two a day, or one every 2 to 3 days. ¨  babies often have loose, yellow stools. Formula-fed babies have more formed stools. ¨ If your baby's stools look like little pellets, he or she is constipated. After 2 days of constipation, call your baby's doctor. · If your baby will be circumcised, you can care for him at home. ¨ Gently rinse his penis with warm water after every diaper change. Do not try to remove the film that forms on the penis. This film will go away on its own. Pat dry. ¨ Put petroleum ointment, such as Vaseline, on the area of the diaper that will touch your baby's penis. This will keep the diaper from sticking to your baby. ¨ Ask the doctor about giving your baby acetaminophen (Tylenol) for pain. Where can you learn more? Go to http://remi-kannan.info/. Enter 68  97 in the search box to learn more about \"Week 37 of Your Pregnancy: Care Instructions. \"  Current as of: 2017  Content Version: 11.7  © 9044-0824 [employer]. Care instructions adapted under license by [employer] (which disclaims liability or warranty for this information).  If you have questions about a medical condition or this instruction, always ask your healthcare professional. [place] [address] any warranty or liability for your use of this information.

## 2018-09-19 ENCOUNTER — ROUTINE PRENATAL (OUTPATIENT)
Dept: OBGYN CLINIC | Age: 33
End: 2018-09-19

## 2018-09-19 VITALS
HEIGHT: 67 IN | WEIGHT: 180 LBS | SYSTOLIC BLOOD PRESSURE: 120 MMHG | BODY MASS INDEX: 28.25 KG/M2 | DIASTOLIC BLOOD PRESSURE: 80 MMHG

## 2018-09-19 DIAGNOSIS — Z34.80 ENCOUNTER FOR SUPERVISION OF OTHER NORMAL PREGNANCY, UNSPECIFIED TRIMESTER: Primary | ICD-10-CM

## 2018-09-19 NOTE — MR AVS SNAPSHOT
[address] 1007 [facility] 
[phone] Patient: Luis Angel Fong MRN: [id number] ECC:9/08/6692 Visit Information Date & Time Provider Department Dept. Phone Encounter #  
 9/19/2018  9:50 AM Abrahan Mcgarry MD [facility] [employer] [phone] [id number] Follow-up Instructions Return in about 1 week (around 9/26/2018) for Follow up OB visit. 9/26/2018  9:40 AM  
OB VISIT with Abrahan Mcgarry MD  
[employer] ([facility]) Appt Note: ob  
 [address] [facility] [address] [facility] 99 [address]  
[facility] 31 1233 [address] Upcoming Health Maintenance Date Due  
 PAP AKA CERVICAL CYTOLOGY 2/21/2021 Allergies as of 9/19/2018  Review Complete On: 9/19/2018 By: Marlena Mooney Severity Noted Reaction Type Reactions Bactrim [Sulfamethoprim]  03/24/2018    Hives Ceftin [Cefuroxime Axetil]  11/30/2010    Hives Gardasil [H Papillomavirus Vac,qval (Pf)]  09/07/2012    Other (comments)  
 syncope Current Immunizations  Reviewed on 10/16/2017 Name Date Hep A Vaccine (Adult) 1/30/2014 Human Papillomavirus 1/1/2006 Influenza Vaccine 8/19/2017, 9/16/2014, 8/24/2013 Influenza Vaccine Mittie Shouts) 9/2/2018 TDAP Vaccine 9/7/2012 Tdap 7/25/2018, 4/16/2015 Not reviewed this visit Vitals BP Height(growth percentile) Weight(growth percentile) LMP BMI OB Status 120/80 5' 7\" (1.702 m) 180 lb (81.6 kg) 12/24/2017 (Exact Date) 28.19 kg/m2 Pregnant Smoking Status Never Smoker BMI and BSA Data Body Mass Index Body Surface Area  
 28.19 kg/m 2 1.96 m 2 Preferred Pharmacy Pharmacy Name Phone [facility] 1 [address]  [facility] [phone] Your Updated Medication List  
  
   
This list is accurate as of 18 10:05 AM.  Always use your most recent med list.  
  
  
  
  
 butalbital-acetaminophen-caffeine -40 mg per tablet Commonly known as:  Mardeen Triston Take 1 Tab by mouth every four (4) hours as needed for Pain. NexIUM 20 mg capsule Generic drug:  esomeprazole Take  by mouth daily. PROCTOZONE-HC 2.5 % rectal cream  
Generic drug:  hydrocortisone APPLY RECTALLY Q 12 H  
  
 SLOW RELEASE IRON PO Take  by mouth. * ZANTAC 75 75 mg Tab Generic drug:  raNITIdine Take 75 mg by mouth two (2) times a day. * ZANTAC 150 mg tablet Generic drug:  raNITIdine Take 150 mg by mouth two (2) times a day. * Notice: This list has 2 medication(s) that are the same as other medications prescribed for you. Read the directions carefully, and ask your doctor or other care provider to review them with you. Follow-up Instructions Return in about 1 week (around 2018) for Follow up OB visit. Patient Instructions Week 38 of Your Pregnancy: Care Instructions Your Care Instructions Believe it or not, your baby is almost here. You may have ideas about your baby's personality because of how much he or she moves. Or you may have noticed how he or she responds to sounds, warmth, cold, and light. You may even know what kind of music your baby likes. By now, you have a better idea of what to expect during delivery. You may have talked about your birth preferences with your doctor. But even if you want a vaginal birth, it is a good idea to learn about  births.  birth means that your baby is born through a cut (incision) in your lower belly. It is sometimes the best choice for the health of the baby and the mother. This care sheet can help you understand  births. It also gives you information about what to expect after your baby is born.  And it helps you understand more about postpartum depression. Follow-up care is a key part of your treatment and safety. Be sure to make and go to all appointments, and call your doctor if you are having problems. It's also a good idea to know your test results and keep a list of the medicines you take. How can you care for yourself at home? Learn about  birth · Most C-sections are unplanned. They are done because of problems that occur during labor. These problems might include: 
¨ Labor that slows or stops. ¨ High blood pressure or other problems for the mother. ¨ Signs of distress in the baby. These signs may include a very fast or slow heart rate. · Although most mothers and babies do well after , it is major surgery. It has more risks than a vaginal delivery. · In some cases, a planned  may be safer than a vaginal delivery. This may be the case if: ¨ The mother has a health problem, such as a heart condition. ¨ The baby isn't in a head-down position for delivery. This is called a breech position. ¨ The uterus has scars from past surgeries. This could increase the chance of a tear in the uterus. ¨ There is a problem with the placenta. ¨ The mother has an infection, such as genital herpes, that could be spread to the baby. ¨ The mother is having twins or more. ¨ The baby weighs 9 to 10 pounds or more. · Because of the risks of , planned C-sections generally should be done only for medical reasons. And a planned  should be done at 39 weeks or later unless there is a medical reason to do it sooner. Know what to expect after delivery, and plan for the first few weeks at home · You, your baby, and your partner or  will get identification bands. Only people with matching bands can  the baby from the nursery. · You will learn how to feed, diaper, and bathe your baby. And you will learn how to care for the umbilical cord stump.  If your baby will be circumcised, you will also learn how to care for that. · Ask people to wait to visit you until you are at home. And ask them to wash their hands before they touch your baby. · Make sure you have another adult in your home for at least 2 or 3 days after the birth. · During the first 2 weeks, limit when friends and family can visit. · Do not allow visitors who have colds or infections. Make sure all visitors are up to date with their vaccinations. Never let anyone smoke around your baby. · Try to nap when the baby naps. Be aware of postpartum depression · \"Baby blues\" are common for the first 1 to 2 weeks after birth. You may cry or feel sad or irritable for no reason. · For some women, these feelings last longer and are more intense. This is called postpartum depression. · If your symptoms last for more than a few weeks or you feel very depressed, ask your doctor for help. · Postpartum depression can be treated. Support groups and counseling can help. Sometimes medicine can also help. Where can you learn more? Go to http://remi-kannan.info/. Enter B044 in the search box to learn more about \"Week 38 of Your Pregnancy: Care Instructions. \" Current as of: November 21, 2017 Content Version: 11.7 © 2049-7374 [employer]. Care instructions adapted under license by [employer] (which disclaims liability or warranty for this information). If you have questions about a medical condition or this instruction, always ask your healthcare professional. [place] [address] any warranty or liability for your use of this information. Introducing [facility]! Dear Tim Burk: Thank you for requesting a NVC Lighting account. Our records indicate that you already have an active NVC Lighting account. You can access your account anytime at [email or web address] Did you know that you can access your hospital and ER discharge instructions at any time in RFEyeD? You can also review all of your test results from your hospital stay or ER visit. Additional Information If you have questions, please visit the Frequently Asked Questions section of the RFEyeD website at [email or web address] Remember, RFEyeD is NOT to be used for urgent needs. For medical emergencies, dial 911. Now available from your iPhone and Android! Please provide this summary of care documentation to your next provider. Your primary care clinician is listed as Peter Cox. If you have any questions after today's visit, please call [phone].

## 2018-09-19 NOTE — PATIENT INSTRUCTIONS
Week 38 of Your Pregnancy: Care Instructions  Your Care Instructions    Believe it or not, your baby is almost here. You may have ideas about your baby's personality because of how much he or she moves. Or you may have noticed how he or she responds to sounds, warmth, cold, and light. You may even know what kind of music your baby likes. By now, you have a better idea of what to expect during delivery. You may have talked about your birth preferences with your doctor. But even if you want a vaginal birth, it is a good idea to learn about  births.  birth means that your baby is born through a cut (incision) in your lower belly. It is sometimes the best choice for the health of the baby and the mother. This care sheet can help you understand  births. It also gives you information about what to expect after your baby is born. And it helps you understand more about postpartum depression. Follow-up care is a key part of your treatment and safety. Be sure to make and go to all appointments, and call your doctor if you are having problems. It's also a good idea to know your test results and keep a list of the medicines you take. How can you care for yourself at home? Learn about  birth  · Most C-sections are unplanned. They are done because of problems that occur during labor. These problems might include:  ¨ Labor that slows or stops. ¨ High blood pressure or other problems for the mother. ¨ Signs of distress in the baby. These signs may include a very fast or slow heart rate. · Although most mothers and babies do well after , it is major surgery. It has more risks than a vaginal delivery. · In some cases, a planned  may be safer than a vaginal delivery. This may be the case if:  ¨ The mother has a health problem, such as a heart condition. ¨ The baby isn't in a head-down position for delivery. This is called a breech position.   ¨ The uterus has scars from past surgeries. This could increase the chance of a tear in the uterus. ¨ There is a problem with the placenta. ¨ The mother has an infection, such as genital herpes, that could be spread to the baby. ¨ The mother is having twins or more. ¨ The baby weighs 9 to 10 pounds or more. · Because of the risks of , planned C-sections generally should be done only for medical reasons. And a planned  should be done at 39 weeks or later unless there is a medical reason to do it sooner. Know what to expect after delivery, and plan for the first few weeks at home  · You, your baby, and your partner or  will get identification bands. Only people with matching bands can  the baby from the nursery. · You will learn how to feed, diaper, and bathe your baby. And you will learn how to care for the umbilical cord stump. If your baby will be circumcised, you will also learn how to care for that. · Ask people to wait to visit you until you are at home. And ask them to wash their hands before they touch your baby. · Make sure you have another adult in your home for at least 2 or 3 days after the birth. · During the first 2 weeks, limit when friends and family can visit. · Do not allow visitors who have colds or infections. Make sure all visitors are up to date with their vaccinations. Never let anyone smoke around your baby. · Try to nap when the baby naps. Be aware of postpartum depression  · \"Baby blues\" are common for the first 1 to 2 weeks after birth. You may cry or feel sad or irritable for no reason. · For some women, these feelings last longer and are more intense. This is called postpartum depression. · If your symptoms last for more than a few weeks or you feel very depressed, ask your doctor for help. · Postpartum depression can be treated. Support groups and counseling can help. Sometimes medicine can also help. Where can you learn more?   Go to http://remi-kannan.info/. Enter B044 in the search box to learn more about \"Week 38 of Your Pregnancy: Care Instructions. \"  Current as of: November 21, 2017  Content Version: 11.7  © 6179-5471 [employer]pax[employer]. Care instructions adapted under license by [employer]Cell (which disclaims liability or warranty for this information). If you have questions about a medical condition or this instruction, always ask your healthcare professional. [address] any warranty or liability for your use of this information.

## 2018-09-19 NOTE — PROGRESS NOTES
[facility] OB-GY[facility]  [email or web address]  [phone]    Evelin Pulido MD, FACOG     Follow-up OB visit    Chief Complaint   Patient presents with    Routine Prenatal Visit       Vitals:    18 1003   BP: 120/80   Weight: 180 lb (81.6 kg)   Height: 5' 7\" (1.702 m)       Patient Active Problem List    Diagnosis Date Noted    Pregnancy 2018    Low lying placenta nos or without hemorrhage, third trimester 2018    Encounter for supervision of other normal pregnancy, unspecified trimester 2018    Scoliosis     Normal labor and delivery 2015    GERD (gastroesophageal reflux disease) 2015    Migraine 2013    Eczema 2013    Contusion of left knee 2013    Acne         The patient reports the following concerns: none    See PN flowsheet for exam    29 y.o.  38w3d   No diagnosis found. [] SAB/bleeding precautions reviewed   [] PTL/PPROM precautions reviewed   [x] Labor precautions reviewed   [x] Fetal kick counts discussed   [] Labs reviewed with patient   [] Brent Curl precautions reviewed   [] Consent reviewed   [] Handouts given to pt   [] Glucola handout    [] GBS/labor/Magic Hour handout   []    [] We reviewed CDC recommendations for Tdap for patient and close contacts and RBA of receiving in pregnancy, advised obtaining in third trimester   [] Reviewed healthy nutrition in pregnancy and good exercise practices   [] We disc safer medications in pregnancy and referred patient to [facility] NAHOMI resources   [] We reviewed CDC recommendations for flu vaccine and RBA of receiving in pregnancy   []    []    []         Follow-up Disposition:  Return in about 1 week (around 2018) for Follow up OB visit. No orders of the defined types were placed in this encounter.       Evelin Pulido MD      Patient unable to produce urine sample this visit

## 2018-09-26 ENCOUNTER — ROUTINE PRENATAL (OUTPATIENT)
Dept: OBGYN CLINIC | Age: 33
End: 2018-09-26

## 2018-09-26 VITALS
BODY MASS INDEX: 28.58 KG/M2 | SYSTOLIC BLOOD PRESSURE: 120 MMHG | WEIGHT: 182.13 LBS | HEIGHT: 67 IN | DIASTOLIC BLOOD PRESSURE: 78 MMHG

## 2018-09-26 DIAGNOSIS — Z34.80 ENCOUNTER FOR SUPERVISION OF OTHER NORMAL PREGNANCY, UNSPECIFIED TRIMESTER: Primary | ICD-10-CM

## 2018-09-26 NOTE — MR AVS SNAPSHOT
[address] [facility] [address] 100[address] [facility] 
[phone] Patient: Juli Whiting MRN: [id number] PRQ:[birth year] Visit Information Date & Time Provider Department Dept. Phone Encounter #  
 9/26/2018  9:40 AM MD Brodie Lombardi [phone] [id number]  
  
 10/2/2018  9:30 AM  
OB VISIT with MD Brodie Lombardi ([facility]) Appt Note: 1wk fob  
 [address] [facility] [address] [phone]  
[facility] 31 1233 [address] Upcoming Health Maintenance Date Due  
 PAP AKA CERVICAL CYTOLOGY 2/21/2021 Allergies as of 9/26/2018  Review Complete On: 9/19/2018 By: Swapnil Ling MD  
  
 Severity Noted Reaction Type Reactions Bactrim [Sulfamethoprim]  03/24/2018    Hives Ceftin [Cefuroxime Axetil]  11/30/2010    Hives Gardasil [H Papillomavirus Vac,qval (Pf)]  09/07/2012    Other (comments)  
 syncope Current Immunizations  Reviewed on 10/16/2017 Name Date Hep A Vaccine (Adult) 1/30/2014 Human Papillomavirus 1/1/2006 Influenza Vaccine 8/19/2017, 9/16/2014, 8/24/2013 Influenza Vaccine Jodi Sally) 9/2/2018 TDAP Vaccine 9/7/2012 Tdap 7/25/2018, 4/16/2015 Not reviewed this visit Vitals BP Height(growth percentile) Weight(growth percentile) LMP BMI OB Status 120/78 5' 7\" (1.702 m) 182 lb 2 oz (82.6 kg) 12/24/2017 (Exact Date) 28.52 kg/m2 Pregnant Smoking Status Never Smoker Vitals History BMI and BSA Data Body Mass Index Body Surface Area 28.52 kg/m 2 1.98 m 2 Preferred Pharmacy Pharmacy Name Phone [facility] 1 [address]  [facility] [phone] Your Updated Medication List  
  
   
 This list is accurate as of 18  9:55 AM.  Always use your most recent med list.  
  
  
  
  
 butalbital-acetaminophen-caffeine -40 mg per tablet Commonly known as:  Gilberto Hagan Take 1 Tab by mouth every four (4) hours as needed for Pain. NexIUM 20 mg capsule Generic drug:  esomeprazole Take  by mouth daily. PROCTOZONE-HC 2.5 % rectal cream  
Generic drug:  hydrocortisone APPLY RECTALLY Q 12 H  
  
 SLOW RELEASE IRON PO Take  by mouth. Patient Instructions Week 39 of Your Pregnancy: Care Instructions Your Care Instructions During these final weeks, you may feel anxious to see your new baby. [place] babies often look different from what you see in pictures or movies. Right after birth, their heads may have a strange shape. Their eyes may be puffy. And their genitals may be swollen. They may also have very dry skin, or red marks on the eyelids, nose, or neck. Still, most parents think their babies are beautiful. Follow-up care is a key part of your treatment and safety. Be sure to make and go to all appointments, and call your doctor if you are having problems. It's also a good idea to know your test results and keep a list of the medicines you take. How can you care for yourself at home? Prepare to breastfeed · If you are breastfeeding, continue to eat healthy foods. · Avoid alcohol, cigarettes, and drugs. This includes prescription and over-the-counter medicines. · You can help prevent sore nipples if you feed your baby in the correct position. Nurses will help you learn to do this. · Your  will need to be fed about every 1½ to 3 hours. Choose the right birth control after your baby is born · Women who are breastfeeding can still get pregnant. Use birth control if you don't want to get pregnant.  
· Intrauterine devices (IUDs) work for women who want to wait at least 2 years before getting pregnant again. They are safe to use while you are breastfeeding. · Depo-Provera can be used while you are breastfeeding. It is a shot you get every 3 months. · Birth control pills work well. But you need a different kind of pill while you are breastfeeding. And when you start taking these pills, you need to make sure to use another type of birth control until you start your second pack. · Diaphragms, cervical caps, tubal implants, and condoms with spermicide work less well after birth. If you have a diaphragm or cervical cap, you will need to have it refitted. · Tubal ligation (tying your tubes) and vasectomy are both permanent. These are good options if you are sure you are done having children. Where can you learn more? Go to http://remi-kannan.info/. Enter C557 in the search box to learn more about \"Week 39 of Your Pregnancy: Care Instructions. \" Current as of: November 21, 2017 Content Version: 11.7 © 3523-3721 Confer. Care instructions adapted under license by Pixtr (which disclaims liability or warranty for this information). If you have questions about a medical condition or this instruction, always ask your healthcare professional. [place] [address] any warranty or liability for your use of this information. Introducing [facility]! Dear Yoly Field: Thank you for requesting a VanDyne SuperTurbo account. Our records indicate that you already have an active VanDyne SuperTurbo account. You can access your account anytime at [email or web address] Did you know that you can access your hospital and ER discharge instructions at any time in VanDyne SuperTurbo? You can also review all of your test results from your hospital stay or ER visit. Additional Information If you have questions, please visit the Frequently Asked Questions section of the VanDyne SuperTurbo website at [email or web address] Remember, MyChart is NOT to be used for urgent needs. For medical emergencies, dial 911. Now available from your iPhone and Android! Please provide this summary of care documentation to your next provider. Your primary care clinician is listed as Darlene Guo. If you have any questions after today's visit, please call [phone].

## 2018-09-26 NOTE — PROGRESS NOTES
_ [facility] OB-GYN  [email or web address]  [phone]    Mary Watkins MD, FACOG     Follow-up OB visit    Chief Complaint   Patient presents with    Routine Prenatal Visit       Vitals:    18 0946   BP: 120/78   Weight: 182 lb 2 oz (82.6 kg)   Height: 5' 7\" (1.702 m)       Patient Active Problem List    Diagnosis Date Noted    Pregnancy 2018    Low lying placenta nos or without hemorrhage, third trimester 2018    Encounter for supervision of other normal pregnancy, unspecified trimester 2018    Scoliosis     Normal labor and delivery 2015    GERD (gastroesophageal reflux disease) 2015    Migraine 2013    Eczema 2013    Contusion of left knee 2013    Acne         The patient reports the following concerns: none    See PN flowsheet for exam    29 y.o.  39w3d   Encounter Diagnosis   Name Primary?  Encounter for supervision of other normal pregnancy, unspecified trimester Yes     Disc r/b/a of induction and pitocin use and increase risk of longer labor,  section, bleeding and infection.    Pt wants to schedule next week   [] SAB/bleeding precautions reviewed   [] PTL/PPROM precautions reviewed   [x] Labor precautions reviewed   [] Fetal kick counts discussed   [] Labs reviewed with patient   [] Henreitta  precautions reviewed   [] Consent reviewed   [] Handouts given to pt   [] Glucola handout    [] GBS/labor/Magic Hour handout   []    [] We reviewed CDC recommendations for Tdap for patient and close contacts and RBA of receiving in pregnancy, advised obtaining in third trimester   [] Reviewed healthy nutrition in pregnancy and good exercise practices   [] We disc safer medications in pregnancy and referred patient to [facility] NAHOMI resources   [] We reviewed CDC recommendations for flu vaccine and RBA of receiving in pregnancy   []    []    []         Follow-up Disposition:  Return in about 1 week (around 10/3/2018) for Follow up OB visit. No orders of the defined types were placed in this encounter.       Major Loco MD

## 2018-09-26 NOTE — PATIENT INSTRUCTIONS
Week 39 of Your Pregnancy: Care Instructions  Your Care Instructions    During these final weeks, you may feel anxious to see your new baby. [place] babies often look different from what you see in pictures or movies. Right after birth, their heads may have a strange shape. Their eyes may be puffy. And their genitals may be swollen. They may also have very dry skin, or red marks on the eyelids, nose, or neck. Still, most parents think their babies are beautiful. Follow-up care is a key part of your treatment and safety. Be sure to make and go to all appointments, and call your doctor if you are having problems. It's also a good idea to know your test results and keep a list of the medicines you take. How can you care for yourself at home? Prepare to breastfeed  · If you are breastfeeding, continue to eat healthy foods. · Avoid alcohol, cigarettes, and drugs. This includes prescription and over-the-counter medicines. · You can help prevent sore nipples if you feed your baby in the correct position. Nurses will help you learn to do this. · Your  will need to be fed about every 1½ to 3 hours. Choose the right birth control after your baby is born  · Women who are breastfeeding can still get pregnant. Use birth control if you don't want to get pregnant. · Intrauterine devices (IUDs) work for women who want to wait at least 2 years before getting pregnant again. They are safe to use while you are breastfeeding. · Depo-Provera can be used while you are breastfeeding. It is a shot you get every 3 months. · Birth control pills work well. But you need a different kind of pill while you are breastfeeding. And when you start taking these pills, you need to make sure to use another type of birth control until you start your second pack. · Diaphragms, cervical caps, tubal implants, and condoms with spermicide work less well after birth.  If you have a diaphragm or cervical cap, you will need to have it refitted. · Tubal ligation (tying your tubes) and vasectomy are both permanent. These are good options if you are sure you are done having children. Where can you learn more? Go to http://remi-kannan.info/. Enter K645 in the search box to learn more about \"Week 39 of Your Pregnancy: Care Instructions. \"  Current as of: November 21, 2017  Content Version: 11.7  © 6380-6050 [employer]. Care instructions adapted under license by [employer] (which disclaims liability or warranty for this information). If you have questions about a medical condition or this instruction, always ask your healthcare professional. [address] any warranty or liability for your use of this information.

## 2018-10-01 ENCOUNTER — ANESTHESIA EVENT (OUTPATIENT)
Dept: LABOR AND DELIVERY | Age: 33
End: 2018-10-01
Payer: COMMERCIAL

## 2018-10-01 ENCOUNTER — HOSPITAL ENCOUNTER (INPATIENT)
Age: 33
LOS: 2 days | Discharge: HOME | End: 2018-10-03
Attending: OBSTETRICS & GYNECOLOGY | Admitting: OBSTETRICS & GYNECOLOGY
Payer: COMMERCIAL

## 2018-10-01 ENCOUNTER — ANESTHESIA (OUTPATIENT)
Dept: LABOR AND DELIVERY | Age: 33
End: 2018-10-01
Payer: COMMERCIAL

## 2018-10-01 PROBLEM — O26.899 ABDOMINAL PAIN IN PREGNANCY: Status: ACTIVE | Noted: 2018-10-01

## 2018-10-01 PROBLEM — R10.9 ABDOMINAL PAIN IN PREGNANCY: Status: ACTIVE | Noted: 2018-10-01

## 2018-10-01 LAB
A1 MICROGLOB PLACENTAL VAG QL: NEGATIVE
BASOPHILS # BLD: 0 K/UL (ref 0–0.1)
BASOPHILS NFR BLD: 0 % (ref 0–1)
CONTROL LINE PRESENT?: NORMAL
DAILY QC (YES/NO)?: YES
DIFFERENTIAL METHOD BLD: ABNORMAL
EOSINOPHIL # BLD: 0 K/UL (ref 0–0.4)
EOSINOPHIL NFR BLD: 0 % (ref 0–7)
ERYTHROCYTE [DISTWIDTH] IN BLOOD BY AUTOMATED COUNT: 14.7 % (ref 11.5–14.5)
ERYTHROCYTE [DISTWIDTH] IN BLOOD BY AUTOMATED COUNT: 15.2 % (ref 11.5–14.5)
EXPIRATION DATE: NORMAL
HCT VFR BLD AUTO: 31.7 % (ref 35–47)
HCT VFR BLD AUTO: 35.1 % (ref 35–47)
HGB BLD-MCNC: 10.2 G/DL (ref 11.5–16)
HGB BLD-MCNC: 11.2 G/DL (ref 11.5–16)
IMM GRANULOCYTES # BLD: 0.1 K/UL (ref 0–0.04)
IMM GRANULOCYTES NFR BLD AUTO: 1 % (ref 0–0.5)
INTERNAL NEGATIVE CONTROL: NORMAL
KIT LOT NO.: NORMAL
LYMPHOCYTES # BLD: 1.3 K/UL (ref 0.8–3.5)
LYMPHOCYTES NFR BLD: 15 % (ref 12–49)
MCH RBC QN AUTO: 26.7 PG (ref 26–34)
MCH RBC QN AUTO: 26.9 PG (ref 26–34)
MCHC RBC AUTO-ENTMCNC: 31.9 G/DL (ref 30–36.5)
MCHC RBC AUTO-ENTMCNC: 32.2 G/DL (ref 30–36.5)
MCV RBC AUTO: 83 FL (ref 80–99)
MCV RBC AUTO: 84.4 FL (ref 80–99)
MONOCYTES # BLD: 0.5 K/UL (ref 0–1)
MONOCYTES NFR BLD: 6 % (ref 5–13)
NEUTS SEG # BLD: 7 K/UL (ref 1.8–8)
NEUTS SEG NFR BLD: 78 % (ref 32–75)
NRBC # BLD: 0 K/UL (ref 0–0.01)
NRBC # BLD: 0 K/UL (ref 0–0.01)
NRBC BLD-RTO: 0 PER 100 WBC
NRBC BLD-RTO: 0 PER 100 WBC
PH, VAGINAL FLUID: 5 (ref 5–6.1)
PLATELET # BLD AUTO: 150 K/UL (ref 150–400)
PLATELET # BLD AUTO: 164 K/UL (ref 150–400)
PMV BLD AUTO: 10.3 FL (ref 8.9–12.9)
PMV BLD AUTO: 11 FL (ref 8.9–12.9)
RBC # BLD AUTO: 3.82 M/UL (ref 3.8–5.2)
RBC # BLD AUTO: 4.16 M/UL (ref 3.8–5.2)
WBC # BLD AUTO: 11.7 K/UL (ref 3.6–11)
WBC # BLD AUTO: 8.9 K/UL (ref 3.6–11)

## 2018-10-01 PROCEDURE — 99283 EMERGENCY DEPT VISIT LOW MDM: CPT

## 2018-10-01 PROCEDURE — 76060000078 HC EPIDURAL ANESTHESIA: Performed by: NURSE ANESTHETIST, CERTIFIED REGISTERED

## 2018-10-01 PROCEDURE — 77030014125 HC TY EPDRL BBMI -B: Performed by: ANESTHESIOLOGY

## 2018-10-01 PROCEDURE — 74011000258 HC RX REV CODE- 258: Performed by: OBSTETRICS & GYNECOLOGY

## 2018-10-01 PROCEDURE — 74011250637 HC RX REV CODE- 250/637: Performed by: OBSTETRICS & GYNECOLOGY

## 2018-10-01 PROCEDURE — 4A0HXCZ MEASUREMENT OF PRODUCTS OF CONCEPTION, CARDIAC RATE, EXTERNAL APPROACH: ICD-10-PCS | Performed by: OBSTETRICS & GYNECOLOGY

## 2018-10-01 PROCEDURE — 00HU33Z INSERTION OF INFUSION DEVICE INTO SPINAL CANAL, PERCUTANEOUS APPROACH: ICD-10-PCS | Performed by: NURSE ANESTHETIST, CERTIFIED REGISTERED

## 2018-10-01 PROCEDURE — 74011250636 HC RX REV CODE- 250/636: Performed by: OBSTETRICS & GYNECOLOGY

## 2018-10-01 PROCEDURE — 84112 EVAL AMNIOTIC FLUID PROTEIN: CPT | Performed by: OBSTETRICS & GYNECOLOGY

## 2018-10-01 PROCEDURE — 75410000000 HC DELIVERY VAGINAL/SINGLE: Performed by: OBSTETRICS & GYNECOLOGY

## 2018-10-01 PROCEDURE — 0W3R7ZZ CONTROL BLEEDING IN GENITOURINARY TRACT, VIA NATURAL OR ARTIFICIAL OPENING: ICD-10-PCS | Performed by: OBSTETRICS & GYNECOLOGY

## 2018-10-01 PROCEDURE — 0KQM0ZZ REPAIR PERINEUM MUSCLE, OPEN APPROACH: ICD-10-PCS | Performed by: OBSTETRICS & GYNECOLOGY

## 2018-10-01 PROCEDURE — 74011250636 HC RX REV CODE- 250/636: Performed by: NURSE ANESTHETIST, CERTIFIED REGISTERED

## 2018-10-01 PROCEDURE — 10907ZC DRAINAGE OF AMNIOTIC FLUID, THERAPEUTIC FROM PRODUCTS OF CONCEPTION, VIA NATURAL OR ARTIFICIAL OPENING: ICD-10-PCS | Performed by: OBSTETRICS & GYNECOLOGY

## 2018-10-01 PROCEDURE — 74011000250 HC RX REV CODE- 250: Performed by: OBSTETRICS & GYNECOLOGY

## 2018-10-01 PROCEDURE — 74011000250 HC RX REV CODE- 250

## 2018-10-01 PROCEDURE — 75410000002 HC LABOR FEE PER 1 HR: Performed by: OBSTETRICS & GYNECOLOGY

## 2018-10-01 PROCEDURE — 85027 COMPLETE CBC AUTOMATED: CPT | Performed by: OBSTETRICS & GYNECOLOGY

## 2018-10-01 PROCEDURE — 83986 ASSAY PH BODY FLUID NOS: CPT | Performed by: OBSTETRICS & GYNECOLOGY

## 2018-10-01 PROCEDURE — 36415 COLL VENOUS BLD VENIPUNCTURE: CPT | Performed by: OBSTETRICS & GYNECOLOGY

## 2018-10-01 PROCEDURE — 75410000003 HC RECOV DEL/VAG/CSECN EA 0.5 HR: Performed by: OBSTETRICS & GYNECOLOGY

## 2018-10-01 PROCEDURE — 85025 COMPLETE CBC W/AUTO DIFF WBC: CPT | Performed by: OBSTETRICS & GYNECOLOGY

## 2018-10-01 PROCEDURE — 74011250637 HC RX REV CODE- 250/637

## 2018-10-01 PROCEDURE — 65270000029 HC RM PRIVATE

## 2018-10-01 RX ORDER — SIMETHICONE 80 MG
80 TABLET,CHEWABLE ORAL
Status: DISCONTINUED | OUTPATIENT
Start: 2018-10-01 | End: 2018-10-03 | Stop reason: HOSPADM

## 2018-10-01 RX ORDER — MISOPROSTOL 200 UG/1
TABLET ORAL
Status: COMPLETED
Start: 2018-10-01 | End: 2018-10-01

## 2018-10-01 RX ORDER — ACETAMINOPHEN 325 MG/1
650 TABLET ORAL
Status: DISCONTINUED | OUTPATIENT
Start: 2018-10-01 | End: 2018-10-03 | Stop reason: HOSPADM

## 2018-10-01 RX ORDER — CLINDAMYCIN PHOSPHATE 900 MG/50ML
900 INJECTION, SOLUTION INTRAVENOUS EVERY 8 HOURS
Status: DISCONTINUED | OUTPATIENT
Start: 2018-10-01 | End: 2018-10-01 | Stop reason: HOSPADM

## 2018-10-01 RX ORDER — SODIUM CHLORIDE 0.9 % (FLUSH) 0.9 %
5-10 SYRINGE (ML) INJECTION AS NEEDED
Status: DISCONTINUED | OUTPATIENT
Start: 2018-10-01 | End: 2018-10-01 | Stop reason: HOSPADM

## 2018-10-01 RX ORDER — ACETAMINOPHEN 325 MG/1
650 TABLET ORAL
Status: DISCONTINUED | OUTPATIENT
Start: 2018-10-01 | End: 2018-10-01 | Stop reason: HOSPADM

## 2018-10-01 RX ORDER — NALOXONE HYDROCHLORIDE 0.4 MG/ML
0.4 INJECTION, SOLUTION INTRAMUSCULAR; INTRAVENOUS; SUBCUTANEOUS AS NEEDED
Status: DISCONTINUED | OUTPATIENT
Start: 2018-10-01 | End: 2018-10-03 | Stop reason: HOSPADM

## 2018-10-01 RX ORDER — MISOPROSTOL 200 UG/1
800 TABLET ORAL
Status: COMPLETED | OUTPATIENT
Start: 2018-10-01 | End: 2018-10-01

## 2018-10-01 RX ORDER — NALBUPHINE HYDROCHLORIDE 10 MG/ML
10 INJECTION, SOLUTION INTRAMUSCULAR; INTRAVENOUS; SUBCUTANEOUS
Status: DISCONTINUED | OUTPATIENT
Start: 2018-10-01 | End: 2018-10-01 | Stop reason: HOSPADM

## 2018-10-01 RX ORDER — OXYTOCIN/RINGER'S LACTATE 20/1000 ML
125 PLASTIC BAG, INJECTION (ML) INTRAVENOUS
Status: DISCONTINUED | OUTPATIENT
Start: 2018-10-01 | End: 2018-10-03 | Stop reason: HOSPADM

## 2018-10-01 RX ORDER — DOCUSATE SODIUM 100 MG/1
100 CAPSULE, LIQUID FILLED ORAL
Status: DISCONTINUED | OUTPATIENT
Start: 2018-10-01 | End: 2018-10-03 | Stop reason: HOSPADM

## 2018-10-01 RX ORDER — CARBOPROST TROMETHAMINE 250 UG/ML
250 INJECTION, SOLUTION INTRAMUSCULAR ONCE
Status: COMPLETED | OUTPATIENT
Start: 2018-10-01 | End: 2018-10-01

## 2018-10-01 RX ORDER — OXYTOCIN/RINGER'S LACTATE 20/1000 ML
125-500 PLASTIC BAG, INJECTION (ML) INTRAVENOUS ONCE
Status: COMPLETED | OUTPATIENT
Start: 2018-10-01 | End: 2018-10-01

## 2018-10-01 RX ORDER — SODIUM CHLORIDE 0.9 % (FLUSH) 0.9 %
5-10 SYRINGE (ML) INJECTION EVERY 8 HOURS
Status: DISCONTINUED | OUTPATIENT
Start: 2018-10-01 | End: 2018-10-01 | Stop reason: HOSPADM

## 2018-10-01 RX ORDER — MORPHINE SULFATE 4 MG/ML
4 INJECTION, SOLUTION INTRAMUSCULAR; INTRAVENOUS
Status: COMPLETED | OUTPATIENT
Start: 2018-10-01 | End: 2018-10-01

## 2018-10-01 RX ORDER — IBUPROFEN 800 MG/1
800 TABLET ORAL EVERY 8 HOURS
Status: DISCONTINUED | OUTPATIENT
Start: 2018-10-01 | End: 2018-10-03 | Stop reason: HOSPADM

## 2018-10-01 RX ORDER — ONDANSETRON 4 MG/1
4 TABLET, ORALLY DISINTEGRATING ORAL
Status: ACTIVE | OUTPATIENT
Start: 2018-10-01 | End: 2018-10-02

## 2018-10-01 RX ORDER — DIPHENHYDRAMINE HCL 25 MG
25 CAPSULE ORAL
Status: DISCONTINUED | OUTPATIENT
Start: 2018-10-01 | End: 2018-10-03 | Stop reason: HOSPADM

## 2018-10-01 RX ORDER — HYDROCODONE BITARTRATE AND ACETAMINOPHEN 5; 325 MG/1; MG/1
1 TABLET ORAL
Status: DISCONTINUED | OUTPATIENT
Start: 2018-10-01 | End: 2018-10-03 | Stop reason: HOSPADM

## 2018-10-01 RX ORDER — NALOXONE HYDROCHLORIDE 0.4 MG/ML
0.4 INJECTION, SOLUTION INTRAMUSCULAR; INTRAVENOUS; SUBCUTANEOUS AS NEEDED
Status: DISCONTINUED | OUTPATIENT
Start: 2018-10-01 | End: 2018-10-01 | Stop reason: HOSPADM

## 2018-10-01 RX ORDER — MAG HYDROX/ALUMINUM HYD/SIMETH 200-200-20
30 SUSPENSION, ORAL (FINAL DOSE FORM) ORAL
Status: DISCONTINUED | OUTPATIENT
Start: 2018-10-01 | End: 2018-10-01 | Stop reason: HOSPADM

## 2018-10-01 RX ORDER — ONDANSETRON 4 MG/1
8 TABLET, ORALLY DISINTEGRATING ORAL
Status: DISCONTINUED | OUTPATIENT
Start: 2018-10-01 | End: 2018-10-01 | Stop reason: HOSPADM

## 2018-10-01 RX ORDER — BUPIVACAINE HYDROCHLORIDE 2.5 MG/ML
INJECTION, SOLUTION EPIDURAL; INFILTRATION; INTRACAUDAL AS NEEDED
Status: DISCONTINUED | OUTPATIENT
Start: 2018-10-01 | End: 2018-10-01 | Stop reason: HOSPADM

## 2018-10-01 RX ORDER — HYDROCORTISONE ACETATE PRAMOXINE HCL 2.5; 1 G/100G; G/100G
CREAM TOPICAL AS NEEDED
Status: DISCONTINUED | OUTPATIENT
Start: 2018-10-01 | End: 2018-10-03 | Stop reason: HOSPADM

## 2018-10-01 RX ORDER — OXYTOCIN/0.9 % SODIUM CHLORIDE 30/500 ML
500 PLASTIC BAG, INJECTION (ML) INTRAVENOUS ONCE
Status: COMPLETED | OUTPATIENT
Start: 2018-10-01 | End: 2018-10-01

## 2018-10-01 RX ORDER — LIDOCAINE HYDROCHLORIDE AND EPINEPHRINE 15; 5 MG/ML; UG/ML
INJECTION, SOLUTION EPIDURAL AS NEEDED
Status: DISCONTINUED | OUTPATIENT
Start: 2018-10-01 | End: 2018-10-01 | Stop reason: HOSPADM

## 2018-10-01 RX ORDER — METHYLERGONOVINE MALEATE 0.2 MG/ML
0.2 INJECTION INTRAVENOUS ONCE
Status: COMPLETED | OUTPATIENT
Start: 2018-10-01 | End: 2018-10-01

## 2018-10-01 RX ORDER — FENTANYL/BUPIVACAINE/NS/PF 2-1250MCG
1-16 PREFILLED PUMP RESERVOIR EPIDURAL CONTINUOUS
Status: DISCONTINUED | OUTPATIENT
Start: 2018-10-01 | End: 2018-10-03 | Stop reason: HOSPADM

## 2018-10-01 RX ORDER — SODIUM CHLORIDE, SODIUM LACTATE, POTASSIUM CHLORIDE, CALCIUM CHLORIDE 600; 310; 30; 20 MG/100ML; MG/100ML; MG/100ML; MG/100ML
125 INJECTION, SOLUTION INTRAVENOUS CONTINUOUS
Status: DISCONTINUED | OUTPATIENT
Start: 2018-10-01 | End: 2018-10-01 | Stop reason: HOSPADM

## 2018-10-01 RX ADMIN — MISOPROSTOL 800 MCG: 200 TABLET ORAL at 13:15

## 2018-10-01 RX ADMIN — CARBOPROST TROMETHAMINE 250 MCG: 250 INJECTION, SOLUTION INTRAMUSCULAR at 14:00

## 2018-10-01 RX ADMIN — OXYTOCIN 30000 MILLI-UNITS/HR: 10 INJECTION, SOLUTION INTRAMUSCULAR; INTRAVENOUS at 12:24

## 2018-10-01 RX ADMIN — IBUPROFEN 800 MG: 800 TABLET ORAL at 23:03

## 2018-10-01 RX ADMIN — IBUPROFEN 800 MG: 800 TABLET ORAL at 15:30

## 2018-10-01 RX ADMIN — LIDOCAINE HYDROCHLORIDE AND EPINEPHRINE 3 ML: 15; 5 INJECTION, SOLUTION EPIDURAL at 10:05

## 2018-10-01 RX ADMIN — Medication 10 ML/HR: at 10:05

## 2018-10-01 RX ADMIN — GENTAMICIN SULFATE 410 MG: 40 INJECTION, SOLUTION INTRAMUSCULAR; INTRAVENOUS at 16:01

## 2018-10-01 RX ADMIN — Medication 125 ML/HR: at 14:00

## 2018-10-01 RX ADMIN — HYDROCODONE BITARTRATE AND ACETAMINOPHEN 0.5 TABLET: 5; 325 TABLET ORAL at 21:06

## 2018-10-01 RX ADMIN — MORPHINE SULFATE 4 MG: 4 INJECTION, SOLUTION INTRAMUSCULAR; INTRAVENOUS at 13:53

## 2018-10-01 RX ADMIN — Medication 2500 MILLI-UNITS/HR: at 13:42

## 2018-10-01 RX ADMIN — CLINDAMYCIN PHOSPHATE 900 MG: 900 INJECTION, SOLUTION INTRAVENOUS at 15:26

## 2018-10-01 RX ADMIN — SODIUM CHLORIDE, SODIUM LACTATE, POTASSIUM CHLORIDE, AND CALCIUM CHLORIDE 125 ML/HR: 600; 310; 30; 20 INJECTION, SOLUTION INTRAVENOUS at 10:00

## 2018-10-01 RX ADMIN — METHYLERGONOVINE MALEATE 0.2 MG: 0.2 INJECTION, SOLUTION INTRAMUSCULAR; INTRAVENOUS at 13:27

## 2018-10-01 RX ADMIN — BUPIVACAINE HYDROCHLORIDE 8 ML: 2.5 INJECTION, SOLUTION EPIDURAL; INFILTRATION; INTRACAUDAL at 10:05

## 2018-10-01 RX ADMIN — SODIUM CHLORIDE, SODIUM LACTATE, POTASSIUM CHLORIDE, AND CALCIUM CHLORIDE 1000 ML: 600; 310; 30; 20 INJECTION, SOLUTION INTRAVENOUS at 09:05

## 2018-10-01 NOTE — PROGRESS NOTES
Late entry Called to see pt for increased/persistent vaginal bleeding. Misoprostol 800 previously given Methergine 0.2 given 
 
hemabate given Uterus explored x2 Fundus felt firm but small amount of clots removed and uterus massaged 4mg IV morphine given Speculum inserted: No evidence of cervical or vaginal lacs. Bakri balloon placed: 180 cc filled, discontinued additional filling because of maternal pressure and decreased bleeding. Will monitor closely. Clinda/gent given for endometritis prevention after multiple manual explorations and placement of bakri. 5:18 PM 
Pt comfortable, dec bakri output, good urine output Visit Vitals  /77  Pulse 96  Temp 98.7 °F (37.1 °C)  Resp 16  
 Ht 5' 7\" (1.702 m)  Wt 182 lb (82.6 kg)  SpO2 97%  BMI 28.51 kg/m2 Will continue both overnight Check cbc now and am 
Continue care on L and D Spont  complicated by PPH with h/o PPH.   
 
Chinmay Mccall MD

## 2018-10-01 NOTE — IP AVS SNAPSHOT
[facility] 
 
 
 [address] 
[phone] Patient: Verna Boyce MRN: [id number] AYW:5[birth year] About your hospitalization You were admitted on:  October 1, 2018 You last received care in the:  [facility] 3 [facility] You were discharged on:  October 3, 2018 Why you were hospitalized Your primary diagnosis was:  Not on File Your diagnoses also included:  Abdominal Pain In Pregnancy Follow-up Information Follow up With Details Comments Contact Info Buck Wilkinson MD   [address] [facility] [address] [place] [address] 
[phone] Arik Acharya MD In 6 weeks Postpartum visit [address] [place] [address] 
[phone] Arik Acharya MD   [address] [place] [address] 
[phone] Discharge Orders None A check heidy indicates which time of day the medication should be taken. My Medications START taking these medications Instructions Each Dose to Equal  
 Morning Noon Evening Bedtime HYDROcodone-acetaminophen 5-325 mg per tablet Commonly known as:  Daily [place] Your last dose was: Your next dose is: Take 1 Tab by mouth every four (4) hours as needed for Pain. Max Daily Amount: 6 Tabs. Indications: Pain 1 Tab  
    
   
   
   
  
 ibuprofen 600 mg tablet Commonly known as:  MOTRIN Your last dose was: Your next dose is: Take 1 Tab by mouth every six (6) hours as needed for Pain. Take with food. 600 mg CONTINUE taking these medications Instructions Each Dose to Equal  
 Morning Noon Evening Bedtime SLOW RELEASE IRON PO Your last dose was: Your next dose is: Take  by mouth. ASK your doctor about these medications  Instructions Each Dose to Equal  
 Morning Noon Evening Bedtime  
 butalbital-acetaminophen-caffeine -40 mg per tablet Commonly known as:  Pankaj Counts Your last dose was: Your next dose is: Take 1 Tab by mouth every four (4) hours as needed for Pain. 1 Tab NexIUM 20 mg capsule Generic drug:  esomeprazole Your last dose was: Your next dose is: Take  by mouth daily. PROCTOZONE-HC 2.5 % rectal cream  
Generic drug:  hydrocortisone Your last dose was: Your next dose is:    
   
   
 APPLY RECTALLY Q 12 H Where to Get Your Medications These medications were sent to [facility], [facility]  [facility] ([facility]  [phone] Hours:  24-hours Phone:  [phone]  
  ibuprofen 600 mg tablet Information on where to get these meds will be given to you by the nurse or doctor. ! Ask your nurse or doctor about these medications HYDROcodone-acetaminophen 5-325 mg per tablet Opioid Education Prescription Opioids: What You Need to Know: 
 
 
POST DELIVERY DISCHARGE INSTRUCTIONS 
FROM YOUR PHYSICIAN Name: Shelbie Srinivasan YOB: 1985 General:  
 
Read all discharge information provided by the hospital 
 
Diet/Diet Restrictions: 
Eat healthy meals and snacks as desired. Eat foods that are high in fiber and low in fat and cholesterol. Drink eight 8-ounce glasses of water daily; avoid excessive caffeine intake. http://www.kat-kohler.org/. html 
EliteClients.be Medications:  
See discharge medication list and read instructions carefully. Breast Feeding: 
See instructions from your lactation consult. Call [phone] for more information or to locate a lactation consultant. https://www.abraham.info/ Vaccines: If you received the MMR vaccine postpartum you should wait three months until you get pregnant again. You, and close contacts, should make sure that the Tdap vaccine is up to date. This vaccine can decrease the risk of your baby getting pertussis or \"whooping cough. \" You, and close contacts, should receive the influenza vaccine during flu season when appropriate. SalaryStart.tn Tobacco Use: If you (or other people around the baby) smoke or use tobacco products, please try to  use and quit to improve your health and decrease risk to your baby. LimitBuy.nl. htm Swelling in your Legs: 
There are many fluid changes after delivery and you may have more swelling the first few days after delivery  Continue to drink plenty of water, avoid sitting or standing in one position for too long and elevate your feet above your heart, to help reduce some the pressure you may be feeling in your ankles and legs.  Section Incision: 
Steri-strips or tape strips may be removed gently at home approximately 7- 10 days after surgery. Soaking the strips with a warm, wet cloth or taking a shower may make the strips easier to remove. Metal staples are usually removed within 3 to 10 days, either before you leave the hospital or in the office. Make an appointment if needed.     
Insorb absorbable staples may be used under the skin but you may see small white pieces as they dissolve. Skin glue or dermabond will fall off with time. Abdominal incisions should be kept clean by showering. It is not necessary to put soap on the incision; plain tap water is adequate. Avoid scrubbing the area and pat dry. The way your scar looks will change over time and may not reach its final appearance for up to a year. The area may feel either numb or sensitive to touch, which is normal. 
 
 
Anemia: 
Your results show some anemia, or low blood count. You should start, or add, an over the counter elemental iron supplement of 45-65 mg. Consider 'Slow FE' or ferrous sulfate 325 mg once a day for at least three months. Also take vitamin C 250 mg and a daily prenatal vitamin to help anemia. Add a stool softener, like docusate or colace 100 mg (2x/day) to avoid constipation. If you do not tolerate supplements you can try blackstrap molasses (1 tbsp=27mg elemental iron). Physical Activity / Restrictions / Safety:  
 
Avoid heavy lifting, no more that 10 pounds, for 2-3 weeks. No driving while taking narcotic pain medication, of if you can not slam on the brakes. No intercourse for 4-6 weeks, no douching or tampon use until seen by your doctor for your postpartum visit. Use condoms as needed for contraception with sexual activity. You may resume normal exercise after you are cleared by your physician at your postpartum check. You may walk for exercise, as tolerated. Discharge Instructions/Special Treatment/Home Care Needs:  
 
Continue your prenatal vitamins while breast feeding or pumping. Continue to use a squirt bottle with warm water on your perineum/bottom/episiotomy after each bathroom use until bleeding stops. Take stool softeners daily. For example, docusate over the counter stool softener. This is especially important if you are taking narcotic pain medications, because they can cause constipation. Call your doctor for the following: If you have a fever over 100.4 degrees by mouth on two readings. If you have persistent vaginal bleeding heavier than a heavy menstrual period or persistent large clots or if you are bleeding so heavy it is making you feel weak. It is normal to pass larger clots when you first get out of bed: but if they persist, notify your physician. If you have red streaks or increased swelling of legs, painful red streaks on your breast. 
If you have painful urination, or increased pain, redness or discharge with your incision. If you have any questions or concerns. Pain Management:  
 
Take Acetaminophen (Tylenol), Ibuprofen (Advil, Motrin), prescribed pain medications as directed for pain. Do not take Perocet with Tylenol, they both contain acetaminophen. Use a warm water Sitz bath 3 times daily to relieve episiotomy, bottom/perineum or hemorrhoidal discomfort. Apply heating pad to  incision as needed. For hemorrhoidal discomfort, you can use Tucks and Anusol cream as needed and directed. NSAID information for patients: 
Alanna.daily Pain medication/narcotic information for patients:  Take your medicine exactly as prescribed  Store your medicine away from children and in a safe 
place  Do not give your medicine to others  Do not drink alcohol while taking this medicine Follow-Up Care:  
 
Appointment with MD: 
Dr. Mervat Schultz [phone] Schedule your postpartum visit for six weeks Additional Discharge Instructions Please read all of your discharge instructions Follow all of your medication instructions carefully Call our office on the next business day to schedule your follow-up appointment If you have any questions or concerns, please contact us at [phone] or if the situation is urgent contact [phone] Become a [facility] My Chart user so you can access information, results and appointments: go to [email or web address]. [facility]/i3 membranet. The [address] is to bring compassion to healthcare and to be good help to those in need. We aim at providing quality healthcare with an emphasis on respect, justice, compassion, stewardship, integrity, growth and innovation. If you did not receive excellent communication, compassionate care and an outstanding patient experience, please notify Steven Richardson at [email or web address] or [phone] or discuss your concerns with me at your next visit so that we can meet our mission and your expectations Jose Yen MD 
[facility], [address] 
[email or web address]  
[phone] Good Help to Those in Need® MarketRiders Announcement We are excited to announce that we are making your provider's discharge notes available to you in MarketRiders. You will see these notes when they are completed and signed by the physician that discharged you from your recent hospital stay. If you have any questions or concerns about any information you see in MarketRiders, please call the Health Information Department where you were seen or reach out to your Primary Care Provider for more information about your plan of care. Introducing [facility]! Dear Mc Naylor: Thank you for requesting a MarketRiders account. Our records indicate that you already have an active MarketRiders account. You can access your account anytime at [email or web address]. [facility]/i3 membranet Did you know that you can access your hospital and ER discharge instructions at any time in MarketRiders? You can also review all of your test results from your hospital stay or ER visit. Additional Information If you have questions, please visit the Frequently Asked Questions section of the IndiaCollegeSearch website at [email or web address]. Black Box [employer]fuels. [employer]/mychart/. Remember, IndiaCollegeSearch is NOT to be used for urgent needs. For medical emergencies, dial 911. Now available from your iPhone and Android! Introducing Crescencio Mane As a [facility] patient, I wanted to make you aware of our electronic visit tool called Crescencio Mane. Conisus allows you to connect within minutes with a medical provider 24 hours a day, seven days a week via a mobile device or tablet or logging into a secure website from your computer. You can access Crescencio Mane from anywhere in the [place]. A virtual visit might be right for you when you have a simple condition and feel like you just dont want to get out of bed, or cant get away from work for an appointment, when your regular [facility] provider is not available (evenings, weekends or holidays), or when youre out of town and need minor care. Electronic visits cost only $49 and if the CanalesxTV provider determines a prescription is needed to treat your condition, one can be electronically transmitted to a nearby pharmacy*. Please take a moment to enroll today if you have not already done so. The enrollment process is free and takes just a few minutes. To enroll, please download the Conisus clarke to your tablet or phone, or visit [email or web address]. [email or web address] to enroll on your computer. And, as an [address] [place] [address] patient with a [employer] account, the results of your visits will be scanned into your electronic medical record and your primary care provider will be able to view the scanned results. We urge you to continue to see your regular [facility] provider for your ongoing medical care. And while your primary care provider may not be the one available when you seek a Crescencio Mane virtual visit, the peace of mind you get from getting a real diagnosis real time can be priceless. For more information on Crescencio Mane, view our Frequently Asked Questions (FAQs) at [email or web address]. Sincerely, 
 
Tesfaye Rodriguez MD 
Chief Medical Officer [address] *:  certain medications cannot be prescribed via Crescencio Mane Providers Seen During Your Hospitalization Provider Specialty Primary office phone Ciara Ellison MD Obstetrics & Gynecology [phone] Your Primary Care Physician (PCP) Primary Care Physician Office Phone Office Fax Flores Dill [phone] You are allergic to the following Allergen Reactions Bactrim (Sulfamethoprim) Hives Ceftin (Cefuroxime Axetil) Hives Gardasil (H Papillomavirus Vac,Qval (Pf)) Other (comments)  
 syncope Recent Documentation Height Weight Breastfeeding? BMI OB Status Smoking Status 1.702 m 82.6 kg Unknown 28.51 kg/m2 Recent pregnancy Never Smoker Emergency Contacts Name Discharge Info Relation Home Work Mobile Smart[employer] 852 CAREGIVER [3] Spouse [3] [phone] Patient Belongings The following personal items are in your possession at time of discharge: 
                             
 
  
  
 Please provide this summary of care documentation to your next provider. Signatures-by signing, you are acknowledging that this After Visit Summary has been reviewed with you and you have received a copy. Patient Signature:  ____________________________________________________________ Date:  ____________________________________________________________  
  
Ronni Zuniga Provider Signature:  ____________________________________________________________ Date:  ____________________________________________________________

## 2018-10-01 NOTE — H&P
History & Physical 
 
Name: Emily Walton MRN: [id number]  SSN: [id number] YOB: 1985  Age: 29 y.o. Sex: female Subjective:  
 
Estimated Date of Delivery: 18 OB History  Para Term  AB Living 2 1 1 0 0 1 SAB TAB Ectopic Molar Multiple Live Births  
 0 0 0  0 1 Obstetric Comments [facility] Ms. Dee Dee Brown is admitted with pregnancy at 40w1d for labor. Prenatal course was complicated by LLP, resolved. Please see prenatal records for details. Past Medical History:  
Diagnosis Date  Acne  Anemia  Contusion of left knee 2013  Eczema 2013  GERD (gastroesophageal reflux disease) 2015  Migraine 2013  Right knee meniscal tear   
 resolved w physical therapy. BBall injury  Right wrist fracture , 2000  
 x2  Scoliosis   
 mild  Screening for cervical cancer 2010, 3/8/2012  
 normal. Dr. Christie Bae No past surgical history on file. 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  
 Drug use: No  
 Sexual activity: Yes  
  Partners: Male Birth control/ protection: None Family History Problem Relation Age of Onset  Hypertension Mother  Hypertension Father  High Cholesterol Father  Asthma Sister  Cancer Maternal Grandmother   
  colon  Diabetes Paternal Grandfather   
  type 2  
 Heart Attack Paternal Grandfather   
  fatal  
 
 
Allergies Allergen Reactions  Bactrim [Sulfamethoprim] Hives  Ceftin [Cefuroxime Axetil] Hives  Gardasil [H Papillomavirus Vac,Qval (Pf)] Other (comments)  
  syncope Prior to Admission medications Medication Sig Start Date End Date Taking? Authorizing Provider  
esomeprazole (NEXIUM) 20 mg capsule Take  by mouth daily.    Yes Historical Provider PROCTOZONE-HC 2.5 % rectal cream APPLY RECTALLY Q 12 H 7/31/18  Yes Historical Provider  
ferrous sulfate (SLOW RELEASE IRON PO) Take  by mouth. Yes Historical Provider  
butalbital-acetaminophen-caffeine (FIORICET, ESGIC) -40 mg per tablet Take 1 Tab by mouth every four (4) hours as needed for Pain. 6/27/18   Dedrick Ge MD  
  
 
There are no active hospital problems to display for this patient. Review of Systems: A comprehensive review of systems was negative except for that written in the HPI. Constitutional: negative for fevers, chills and weight loss ENT ROS: negative for - hearing change, oral lesions or visual changes Respiratory: negative for cough, wheezing or dyspnea on exertion Cardiovascular: negative for chest pain, irregular heart beats, exertional chest pressure/discomfort Gastrointestinal: negative for dysphagia, nausea and vomiting Genito-Urinary ROS: see HPI Inteument/breast: negative for rash, breast lump and nipple discharge Musculoskeletal:negative for stiff joints, neck pain and muscle weakness Endocrine ROS: negative for - breast changes, galactorrhea or temperature intolerance Hematological and Lymphatic ROS: negative for - bruising or swollen lymph nodes Objective:  
 
Vitals: 
Vitals:  
 10/01/18 1325 10/01/18 9621 10/01/18 2742 10/01/18 3844 BP:    129/80 Pulse:    79 Resp:    16 Temp:    98.3 °F (36.8 °C) SpO2: 99% 98% 100% Weight:      
Height:      
  
 
 
Physical Exam: 
Patient without distress. Heart: Regular rate and rhythm or S1S2 present Lung: clear to auscultation throughout lung fields, no wheezes, no rales, no rhonchi and normal respiratory effort Abdomen: soft, nontender Fundus: soft and non tender Cervical Exam: c/5/-2 vtx, bbow Lower Extremities:  - No cords or calf tenderness. Membranes:  Intact Prenatal Labs:  
Lab Results Component Value Date/Time  
 Rubella, External immune 02/21/2018 GrBStrep, External Negative 2018 HBsAg, External neg 2018 HIV, External neg 2018 Gonorrhea, External neg 2018 Chlamydia, External neg 2018 Assessment/Plan:  
29 y.o.  40w1d 
labor GBS Negative Reassuring fetal surveillance Plan: Admit for labor Epidural prn Anticipate . CEFM/TOCO Signed By:  Justin Fulton MD   
 2018

## 2018-10-01 NOTE — LACTATION NOTE
This note was copied from a baby's chart. Discussed with mother her plan for feeding. Reviewed the benefits of exclusive breast milk feeding during the hospital stay. Informed her of the risks of using formula to supplement in the first few days of life as well as the benefits of successful breast milk feeding; referred her to the Breastfeeding booklet about this information. She acknowledges understanding of information reviewed and states that it is her plan to breast and formula her infant. Will support her choice and offer additional information as needed. Reviewed breastfeeding basics:  How milk is made and normal  breastfeeding behaviors discussed. Supply and demand,  stomach size, early feeding cues, skin to skin bonding with comfortable positioning and baby led latch-on reviewed. How to identify signs of successful breastfeeding sessions reviewed; education on assymetrical latch, signs of effective latching vs shallow, in-effective latching, normal  feeding frequency and duration and expected infant output discussed. Normal course of breastfeeding discussed including the AAP's recommendation that children receive exclusive breast milk feedings for the first six months of life with breast milk feedings to continue through the first year of life and/or beyond as complimentary table foods are added. Breastfeeding Booklet and Warm line information provided with discussion. Discussed typical  weight loss and the importance of pediatrician appointment within 24-48 hours of discharge, at 2 weeks of life and normalcy of requesting pediatric weight checks as needed in between visits. Biological Nurturing breastfeeding principles taught. How Biological Nurturing (BN) 
promotes optimal breastfeeding (BF) sessions discussed. Mother encouraged to seek comfortable semi-reclining breastfeeding positions. Infant placed frontally along maternal contour.   Primitive innate feeding reflexes/behaviors of the  discussed. BN tips and techniques shared; assisted with comfortable breastfeeding positioning. Pt will successfully establish breastfeeding by feeding in response to early feeding cues  
or wake every 3h, will obtain deep latch, and will keep log of feedings/output. Taught to BF at hunger cues and or q 2-3 hrs and to offer 10-20 drops of hand expressed colostrum at any non-feeds. Breast Assessment Left Breast: Medium Left Nipple: Everted, Intact, Short Right Breast: Medium Right Nipple: Everted, Intact, Short Breast- Feeding Assessment Breast-Feeding Experience: Yes (1 month) Type/Quality: Good Lactation Consultant Visits Breast-Feedings: Good Mother/Infant Observation Mother Observation: Alignment, Recognizes feeding cues, Nipple round on release, Lets baby end feeding Infant Observation: Opens mouth, Lips flanged, upper, Lips flanged, lower, Latches nipple and aereolae, Rhythmic suck, Relaxed after feeding LATCH Documentation Latch: Repeated attempts, hold nipple in mouth, stimulate to suck Audible Swallowing: A few with stimulation Type of Nipple: Everted (after stimulation) Comfort (Breast/Nipple): Soft/non-tender Hold (Positioning): No assist from staff, mother able to position/hold infant LATCH Score: 8 Mom did not want to feed in biologic position, fed in cradle position. Mom plans on breastfeeding for 3 months while out of work. She will pump extra milk while she is breastfeeding. She can not pump at work.

## 2018-10-01 NOTE — PROGRESS NOTES
NST Inpatient Procedure Note Gris Santana presents for fetal non-stress test.   
Indication is labor. She is 40w1d. She has been monitored for more than 30 minutes. The FHR was reactive. NST Interpretation: FHR baseline 130 bpm,  
Variability moderate Accelerations present. Decelerations Absent. Uterine contractions were present, q 3-5 minutes Assessment NST is reactive. NST is reassuring. Patient does need admission/observation for further monitoring. Tate Blunt was informed of the NST results and her questions were answered. Plan:  
 [x] Continue admission to labor and delivery  
 [] Continue observation 
 [] Keep routine OB follow up upon discharge 
 [] Reviewed fetal movement kick counts, notify MD if decreased 
 []  
 []

## 2018-10-01 NOTE — PROGRESS NOTES
Labor Progress Note Patient seen, fetal heart rate and contraction pattern evaluated. Physical Exam: 
 
Cervical Exam: C/7/0/vtx bbow Membranes:  Artificial Rupture of Membranes; Amniotic Fluid: medium amount of  meconium fluid Uterine Activity:  q 5-7 Fetal heart variability: moderate Fetal Heart Rate decelerations: none Fetal Heart Rate accelerations: yes Baseline FHR: 130 per minute Assessment/Plan: 
29 y.o.  40w1d Labor Meconium NICU for delivery, disc with NICU face to face request to attend delivery Reassuring fetal status. Anticipate  GIRISHM/REJI Curtis MD

## 2018-10-01 NOTE — PROGRESS NOTES
Delivery Note Patient C/C/+2, pushed over intact perineum. Tight nuchal cord clamped x2 and cut. [facility] 
NICU present for delivery because of meconium Spontaneous placenta delivery. Laceration repaired 2nd degree Fundus firm Bladder emptied with straight cath with sterile technique 200cc plus drained.  cc Counts correct

## 2018-10-01 NOTE — PROGRESS NOTES
Labor Progress Note Patient seen, fetal heart rate and contraction pattern evaluated. Pt c/o pressure Physical Exam: 
 
Cervical Exam:  
C/c/+1 Membranes: s/p arom Uterine Activity:  q 5 min Fetal heart variability: moderate Fetal Heart Rate decelerations: none Fetal Heart Rate accelerations: yes Baseline FHR: 130 per minute Assessment/Plan: 
29 y.o.  40w1d Reassuring fetal status. Anticipate  CEFM/TOCO Jurgenhillary Wheeler MD

## 2018-10-01 NOTE — PROGRESS NOTES
ITSP, pt skin to skin with baby Fundus firm 1finger below umb. Will continue to monitor pp bleeding H/o pph Dominick Renee MD

## 2018-10-01 NOTE — PROGRESS NOTES
CTSP for passing clots, fundus firm. 800 ug miso placed per rectum Uterus explored, about 150 cc clots removed, fundus firm. Will give methergine and monitor closely

## 2018-10-01 NOTE — PROGRESS NOTES
10/01/18 12:01 PM 
CM met with patient to complete initial assessment and to begin discharge planning. Demographics were reviewed and confirmed. Patient and her /FOB Jossie ([phone]) live together and have a 1year old daughter. 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 FOB also works as pharmacist and will have 2 weeks off. Patient's parents live close and will be the couple's main support; patient noted that her mother is a retired nurse and is very helpful. Patient plans to breastfeed and has a pump to use at home. Dr. Cardona Speaker at [facility] [address] [place] [address]) will provide medical follow up for the baby. Patient has car seat, crib, clothing, and other necessary supplies. Denied need for [facility] and Medicaid services. Care Management Interventions PCP Verified by CM: Yes ( [facility], [facility]) Mode of Transport at Discharge: Self Transition of Care Consult (CM Consult): Discharge Planning Current Support Network: [facility], Lives with Spouse Confirm Follow Up Transport: Family Plan discussed with Pt/Family/Caregiver: Yes Discharge Location Discharge Placement: Home with family assistance OLAF Marquez

## 2018-10-01 NOTE — PROGRESS NOTES
0403:  arrives with c/o leaking small amount of fluid twice in the middle of the night. States she felt it around 0430. States it was a very small amount of fluid leaking, no large gush. Pt states she is also having contractions. Pt states she was seen by MFM once during her pregnancy for a possible placenta previa vs. Low lying placenta but states that she was told her placenta grew out of the way. 6638: Nitrazine negative. Will proceed with amnisure. : SBAR report given to SAKSHI Chu RN

## 2018-10-01 NOTE — PROGRESS NOTES
0730:  Reactive NST. Dr Merlin Langford notified of report per RN nazario Campuzano, reactive tracing. REceived verbal order to ambulate patient x 1 hr, return to bed for monitoring/SVE. Pt advised, Mesh panties and pads provided. Pt encouraged to ambulate for an hour, return for mointoring/assessment/SVE.  VSS. 0745:  Pt ambulates off unit accompanied by spouse for fresh air, pitcher of water taken for the walk. 0900:  Pt returned to room, with c/o intensifying UCx; Dr Mulugeta Meredith at [facility], SVE performed as RN placing on monitor for EFM  5/100/-1 with bulging bag. Defer AROM at this time in favor of obtaining epidural.   
#18 G IV to left hand, LR at bolus rate at this time 
0910:  HUYEN Wilson notified pt requesting epidural, orders requested; labs sent for evlauation 0940Beverely Feller HUYEN notified pt now ready for epidural placement. 9480:  HUYEN Wilson at bedside, pre-procedure interview in progress [id number]:  Pt sitting for epidural,  
1004:  Pt to left tilt, peanut ball and pillows for alignment/support. 1100:  AROM by Dr Mulugeta Meredith, Meconium noted, Dr Mulugeta Meredith requests NICU attend delivery 1105:  Reassuring tracing post-AROM 
1150:  UCX palpate mild, Pt c/o increasing rectal/vagional pressure with UCx, not without UCx.  TOCO adjusted, EFM re-assuring. 1220:  [address] infant 1235: Moderate Lochia, small clot expressed 1245: Moderate clots expressed with second fundal exam 
1314:  Dr Mulugeta Meredith at bedside for evaluation of PP bleeding 1320:  Dr Mulugeta Meredith at bedside, manual exploration of utreine cavity, \"full of clots\" (220 cc clots by weight expressed) 1330: Moderate bleeding 1340:  Heavy bleeding noted, Call to Charge RN, request Dr Mulugeta Meredith bedside evaluation, [place]. 1350: Parham placed er Dr Mulugeta Meredith verbal order. 1355:  Dr Mulugeta Meredith at bedside. Sterile speculum inserted, cervical exam by Dr Mulugeta Meredith at this time 1358:  [place]  Bakri ordered,  Oral antibiotics discussed, planned 1400:  Bakri inflated with 60 cc sterile water at this time. 1165 CC total blood loss (delivery and PPHemorrhage) recorded 1402: Total of 180 ML sterile water infused into Bakri balloon. 1415:  Bleeding stable at this time, Dr Clara Barclay eparts bedside. Bakri to [place]ham bag, Labeled. 1419:  VSS, Pt sitting up eating lunch tray 1440:  Call to Dr Clara Barclay to inquire about antibiotic treatment, Dr to consider options (due to pt allergies) and input orders. 1449:  Orders for Gentamycin and Clindamycin acknowledged by this RN 
1500:  Fluids counted, Pt states she is feeling well, VSS 
1600:  Fluids counted, pt states she is feeling well, VSS 
1700:  Fluids counted, Pt states she is feeling much better after a brief rest, VSS 
1720:  CBC drawn at Dr Miranda's request. 
1800:  Dr Clara Barclay updated with Labs/output. 1915:  SBAR at bedside to [employer], RN; relinquishing care at this time. Bedside and Verbal shift change report given to AMADA Shah RN (oncoming nurse) by Marco Archer RN (offgoing nurse). Report included the following information SBAR, Procedure Summary, Intake/Output, MAR and Recent Results.

## 2018-10-02 LAB
BASOPHILS # BLD: 0 K/UL (ref 0–0.1)
BASOPHILS NFR BLD: 0 % (ref 0–1)
DIFFERENTIAL METHOD BLD: ABNORMAL
EOSINOPHIL # BLD: 0 K/UL (ref 0–0.4)
EOSINOPHIL NFR BLD: 0 % (ref 0–7)
ERYTHROCYTE [DISTWIDTH] IN BLOOD BY AUTOMATED COUNT: 15 % (ref 11.5–14.5)
HCT VFR BLD AUTO: 26.7 % (ref 35–47)
HGB BLD-MCNC: 8.6 G/DL (ref 11.5–16)
IMM GRANULOCYTES # BLD: 0.1 K/UL (ref 0–0.04)
IMM GRANULOCYTES NFR BLD AUTO: 1 % (ref 0–0.5)
LYMPHOCYTES # BLD: 1.6 K/UL (ref 0.8–3.5)
LYMPHOCYTES NFR BLD: 17 % (ref 12–49)
MCH RBC QN AUTO: 26.8 PG (ref 26–34)
MCHC RBC AUTO-ENTMCNC: 32.2 G/DL (ref 30–36.5)
MCV RBC AUTO: 83.2 FL (ref 80–99)
MONOCYTES # BLD: 0.8 K/UL (ref 0–1)
MONOCYTES NFR BLD: 9 % (ref 5–13)
NEUTS SEG # BLD: 6.5 K/UL (ref 1.8–8)
NEUTS SEG NFR BLD: 73 % (ref 32–75)
NRBC # BLD: 0 K/UL (ref 0–0.01)
NRBC BLD-RTO: 0 PER 100 WBC
PLATELET # BLD AUTO: 153 K/UL (ref 150–400)
PMV BLD AUTO: 11.1 FL (ref 8.9–12.9)
RBC # BLD AUTO: 3.21 M/UL (ref 3.8–5.2)
WBC # BLD AUTO: 9 K/UL (ref 3.6–11)

## 2018-10-02 PROCEDURE — 74011250636 HC RX REV CODE- 250/636: Performed by: OBSTETRICS & GYNECOLOGY

## 2018-10-02 PROCEDURE — 85025 COMPLETE CBC W/AUTO DIFF WBC: CPT | Performed by: OBSTETRICS & GYNECOLOGY

## 2018-10-02 PROCEDURE — 36415 COLL VENOUS BLD VENIPUNCTURE: CPT | Performed by: OBSTETRICS & GYNECOLOGY

## 2018-10-02 PROCEDURE — 74011250637 HC RX REV CODE- 250/637: Performed by: OBSTETRICS & GYNECOLOGY

## 2018-10-02 PROCEDURE — 65270000029 HC RM PRIVATE

## 2018-10-02 RX ORDER — CLINDAMYCIN PHOSPHATE 900 MG/50ML
900 INJECTION, SOLUTION INTRAVENOUS EVERY 8 HOURS
Status: COMPLETED | OUTPATIENT
Start: 2018-10-02 | End: 2018-10-02

## 2018-10-02 RX ADMIN — CLINDAMYCIN PHOSPHATE 900 MG: 900 INJECTION, SOLUTION INTRAVENOUS at 17:02

## 2018-10-02 RX ADMIN — IBUPROFEN 800 MG: 800 TABLET ORAL at 15:01

## 2018-10-02 RX ADMIN — DOCUSATE SODIUM 100 MG: 100 CAPSULE, LIQUID FILLED ORAL at 23:00

## 2018-10-02 RX ADMIN — CLINDAMYCIN PHOSPHATE 900 MG: 900 INJECTION, SOLUTION INTRAVENOUS at 08:41

## 2018-10-02 RX ADMIN — IBUPROFEN 800 MG: 800 TABLET ORAL at 07:11

## 2018-10-02 RX ADMIN — HYDROCODONE BITARTRATE AND ACETAMINOPHEN 1 TABLET: 5; 325 TABLET ORAL at 09:06

## 2018-10-02 RX ADMIN — HYDROCODONE BITARTRATE AND ACETAMINOPHEN 1 TABLET: 5; 325 TABLET ORAL at 13:56

## 2018-10-02 RX ADMIN — IBUPROFEN 800 MG: 800 TABLET ORAL at 23:00

## 2018-10-02 NOTE — LACTATION NOTE
This note was copied from a baby's chart. Assisted mother with positioning and latching baby to breast.  Baby awake and eager, assisted mother with breast hold so baby may latch deeply. Baby latched deeply with minimal effort, rhythmic sucking with  swallows noted. Reassured mother that baby is doing very well. BF basics reviewed, parents questions answered. Discussed with mother the benefits of breastfeeding for both mom and baby. Discussed with mom that we encourage exclusive breastfeeding for the first 6 months and that it is recommended to continue to breastfeed for a minimum of one year. Research shows that breastfeeding offers an unmatched beginning for our children. Providing infants with human milk gives them the most complete nutrition possible. Human milk provides the perfect mix of nutrients and antibodies needed for babies to thrive. Scientific studies show that breast fed children have fewer and less serious illnesses then those who never receive breast milk. Mothers that breastfeed are healthier and also enjoy emotional benefits of the very special and close relationship formed through breastfeeding. Breastfeeding is economical and allows families to save money. Communities also benefit when mothers breastfeed because each breast fed baby cuts down on our pollution. Hand Expression Education:  Mom taught how to manually hand express her colostrum. Emphasized the importance of providing infant with valuable colostrum as infant rests skin to skin at breast.  Aware to avoid extended periods of non-feeding. Aware to offer 10-20+ drops of colostrum every 2-3 hours until infant is latching and nursing effectively. Taught the rationale behind this low tech but highly effective evidence based practice.  
 
Pt will successfully establish breastfeeding by feeding in response to early feeding cues  
or wake every 3h, will obtain deep latch, and will keep log of feedings/output. Taught to BF at hunger cues and or q 2-3 hrs and to offer 10-20 drops of hand expressed colostrum at any non-feeds. Breast Assessment Left Breast: Medium Left Nipple: Everted, Intact Right Breast: Medium Right Nipple: Everted, Intact Breast- Feeding Assessment Attends Breast-Feeding Classes: No 
Breast-Feeding Experience: Yes (1 month) Breast Trauma/Surgery: No 
Type/Quality: Good Lactation Consultant Visits Breast-Feedings: Good Mother/Infant Observation Mother Observation: Breast comfortable, Recognizes feeding cues Infant Observation: Rhythmic suck, Relaxed after feeding, Opens mouth, Lips flanged, upper, Lips flanged, lower, Latches nipple and aereolae, Frenulum checked, Feeding cues, Audible swallows LATCH Documentation Latch: Grasps breast, tongue down, lips flanged, rhythmic sucking Audible Swallowing: A few with stimulation Type of Nipple: Everted (after stimulation) Comfort (Breast/Nipple): Soft/non-tender Hold (Positioning): Full assist, teach one side, mother does other, staff holds LATCH Score: 8

## 2018-10-02 NOTE — PROGRESS NOTES
Late entry from 510pm rounding. ITSP. Bakri out, raymond out, getting last dose of clindamycin. Tolerated shower and reports moderate lochia. Remove IV if remains stable after abx. Fundus firm 1 f below umbilicus Visit Vitals  /66 (BP 1 Location: Right arm, BP Patient Position: At rest)  Pulse 79  Temp 98 °F (36.7 °C)  Resp 16  
 Ht 5' 7\" (1.702 m)  Wt 182 lb (82.6 kg)  SpO2 96%  BMI 28.51 kg/m2 Monitor BP< mildly elevated.  
 
Kayla Estrada MD

## 2018-10-02 NOTE — PROGRESS NOTES
1900 Assumed care of pt at this time from [address] [place] [address], SRoly Loja RN. 
 
2100 Bedside shift change report given to Irwin Lira RN (oncoming nurse) by ANABEL Nice RN (offgoing nurse). Report included the following information SBAR, Kardex, Intake/Output, MAR, Recent Results and Med Rec Status.

## 2018-10-02 NOTE — PROGRESS NOTES
Pad changed, small amount of blood noted on pt, pt transferred to wheelchair, tolerated well, epidural removed. 2210-SBAR OUT Report: Mother Verbal report given to ANDREW Schrader Rn (full name & credentials) on this patient, who is now being transferred to MIU (unit) for routine progression of care. Report consisted of patient's Situation, Background, Assessment and Recommendations (SBAR). [place] ID bands were compared with the identification form, and verified with the patient and receiving nurse. Information from the SBAR and the [employer] Report was reviewed with the receiving nurse; opportunity for questions and clarification provided.

## 2018-10-02 NOTE — ROUTINE PROCESS
Bedside and Verbal shift change report given to Afshan Newman (oncoming nurse) by Oralia Pugh RN (offgoing nurse). Report included the following information SBAR, Intake/Output and MAR.

## 2018-10-02 NOTE — PROGRESS NOTES
Bedside shift change report given to [address] [place] [address] (oncoming nurse) by Janeth Klein (offgoing nurse). Report included the following information SBAR and MAR.

## 2018-10-02 NOTE — PROGRESS NOTES
1900- spoke to dr Justo Gomes re: plan of care and transfer to MIU. Pt to be transferred to continue pp care.

## 2018-10-02 NOTE — PROGRESS NOTES
Post-Partum Progress Note Patient doing well post-partum without significant complaint. She is voiding without difficulty, she reports normal lochia. Her pain is well controlled with oral pain medication. She is tolerating a general diet. Delivery information: 
Information for the patient's :  Alyssa Dawkins, Female [[id number]] Delivery of a   female infant via Vaginal, Spontaneous Delivery on 10/1/2018 at 12:20 PM  by . Apgars were 9 and 9. Vitals:  Patient Vitals for the past 8 hrs: 
 BP Temp Pulse Resp  
10/02/18 0530 121/64 97.9 °F (36.6 °C) 78 16 Temp (24hrs), Av.6 °F (37 °C), Min:97.9 °F (36.6 °C), Max:99 °F (37.2 °C) Visit Vitals  /64  Pulse 78  Temp 97.9 °F (36.6 °C)  Resp 16  
 Ht 5' 7\" (1.702 m)  Wt 182 lb (82.6 kg)  SpO2 96%  BMI 28.51 kg/m2 Exam:   
General: Patient without distress. Abdomen: soft, fundus firm at level of umbilicus, nontender Lower extremities: negative for cords or tenderness. Labs:  
Recent Results (from the past 24 hour(s)) CBC WITH AUTOMATED DIFF Collection Time: 10/01/18  9:10 AM  
Result Value Ref Range WBC 8.9 3.6 - 11.0 K/uL  
 RBC 4.16 3.80 - 5.20 M/uL  
 HGB 11.2 (L) 11.5 - 16.0 g/dL HCT 35.1 35.0 - 47.0 % MCV 84.4 80.0 - 99.0 FL  
 MCH 26.9 26.0 - 34.0 PG  
 MCHC 31.9 30.0 - 36.5 g/dL  
 RDW 15.2 (H) 11.5 - 14.5 % PLATELET 862 126 - 700 K/uL MPV 11.0 8.9 - 12.9 FL  
 NRBC 0.0 0  WBC ABSOLUTE NRBC 0.00 0.00 - 0.01 K/uL NEUTROPHILS 78 (H) 32 - 75 % LYMPHOCYTES 15 12 - 49 % MONOCYTES 6 5 - 13 % EOSINOPHILS 0 0 - 7 % BASOPHILS 0 0 - 1 % IMMATURE GRANULOCYTES 1 (H) 0.0 - 0.5 % ABS. NEUTROPHILS 7.0 1.8 - 8.0 K/UL  
 ABS. LYMPHOCYTES 1.3 0.8 - 3.5 K/UL  
 ABS. MONOCYTES 0.5 0.0 - 1.0 K/UL  
 ABS. EOSINOPHILS 0.0 0.0 - 0.4 K/UL  
 ABS. BASOPHILS 0.0 0.0 - 0.1 K/UL  
 ABS. IMM.  GRANS. 0.1 (H) 0.00 - 0.04 K/UL  
 DF AUTOMATED    
CBC W/O DIFF  
 Collection Time: 10/01/18  5:20 PM  
Result Value Ref Range WBC 11.7 (H) 3.6 - 11.0 K/uL  
 RBC 3.82 3.80 - 5.20 M/uL  
 HGB 10.2 (L) 11.5 - 16.0 g/dL HCT 31.7 (L) 35.0 - 47.0 % MCV 83.0 80.0 - 99.0 FL  
 MCH 26.7 26.0 - 34.0 PG  
 MCHC 32.2 30.0 - 36.5 g/dL  
 RDW 14.7 (H) 11.5 - 14.5 % PLATELET 630 456 - 537 K/uL MPV 10.3 8.9 - 12.9 FL  
 NRBC 0.0 0  WBC ABSOLUTE NRBC 0.00 0.00 - 0.01 K/uL CBC WITH AUTOMATED DIFF Collection Time: 10/02/18  2:30 AM  
Result Value Ref Range WBC 9.0 3.6 - 11.0 K/uL  
 RBC 3.21 (L) 3.80 - 5.20 M/uL HGB 8.6 (L) 11.5 - 16.0 g/dL HCT 26.7 (L) 35.0 - 47.0 % MCV 83.2 80.0 - 99.0 FL  
 MCH 26.8 26.0 - 34.0 PG  
 MCHC 32.2 30.0 - 36.5 g/dL  
 RDW 15.0 (H) 11.5 - 14.5 % PLATELET 262 493 - 675 K/uL MPV 11.1 8.9 - 12.9 FL  
 NRBC 0.0 0  WBC ABSOLUTE NRBC 0.00 0.00 - 0.01 K/uL NEUTROPHILS 73 32 - 75 % LYMPHOCYTES 17 12 - 49 % MONOCYTES 9 5 - 13 % EOSINOPHILS 0 0 - 7 % BASOPHILS 0 0 - 1 % IMMATURE GRANULOCYTES 1 (H) 0.0 - 0.5 % ABS. NEUTROPHILS 6.5 1.8 - 8.0 K/UL  
 ABS. LYMPHOCYTES 1.6 0.8 - 3.5 K/UL  
 ABS. MONOCYTES 0.8 0.0 - 1.0 K/UL  
 ABS. EOSINOPHILS 0.0 0.0 - 0.4 K/UL  
 ABS. BASOPHILS 0.0 0.0 - 0.1 K/UL  
 ABS. IMM. GRANS. 0.1 (H) 0.00 - 0.04 K/UL  
 DF AUTOMATED Assessment: 1. Postpartum S/P spontaneous vaginal delivery 2. Patient doing well without significant complications 3. PPH, treated with meds and Bakri 4. POP anemia, acute pph blood loss, stable Plan: 1. Continue routine postpartum care 2. Routine perineal care and maternal education 3. Oral pain medications and bowel regimen as needed 4. Begin to deflate Bakri Arik Acharya MD

## 2018-10-02 NOTE — PROGRESS NOTES
SBAR IN Report: Mother Verbal report received from Alvarado Mon RN (full name & credentials) on this patient, who is now being transferred from Labor and Delivery Unit (unit) for routine progression of care. The patient is not wearing a green \"Anesthesia-Duramorph\" band. Report consisted of patient's Situation, Background, Assessment and Recommendations (SBAR).  ID bands were compared with the identification form, and verified with the patient and transferring nurse. Information from the SBAR and [facility] and the [place] Report was reviewed with the transferring nurse; opportunity for questions and clarification provided.

## 2018-10-03 VITALS
HEART RATE: 82 BPM | BODY MASS INDEX: 28.56 KG/M2 | DIASTOLIC BLOOD PRESSURE: 70 MMHG | SYSTOLIC BLOOD PRESSURE: 126 MMHG | RESPIRATION RATE: 18 BRPM | OXYGEN SATURATION: 96 % | WEIGHT: 182 LBS | TEMPERATURE: 98.5 F | HEIGHT: 67 IN

## 2018-10-03 PROCEDURE — 74011250637 HC RX REV CODE- 250/637: Performed by: OBSTETRICS & GYNECOLOGY

## 2018-10-03 RX ORDER — IBUPROFEN 600 MG/1
600 TABLET ORAL
Qty: 30 TAB | Refills: 0 | Status: SHIPPED | OUTPATIENT
Start: 2018-10-03 | End: 2019-10-22

## 2018-10-03 RX ORDER — HYDROCODONE BITARTRATE AND ACETAMINOPHEN 5; 325 MG/1; MG/1
1 TABLET ORAL
Qty: 10 TAB | Refills: 0 | Status: SHIPPED | OUTPATIENT
Start: 2018-10-03 | End: 2019-10-22

## 2018-10-03 RX ADMIN — DOCUSATE SODIUM 100 MG: 100 CAPSULE, LIQUID FILLED ORAL at 10:41

## 2018-10-03 RX ADMIN — IBUPROFEN 800 MG: 800 TABLET ORAL at 08:17

## 2018-10-03 NOTE — DISCHARGE SUMMARY
Obstetrical Discharge Summary     Name: Katia Frey MRN: [id number]  SSN: [id number]    YOB: 1985  Age: 29 y.o. Sex: female      Admit Date: 10/1/2018    Discharge Date: 10/3/2018     Admitting Physician: Roosevelt Boo MD     Attending Physician:  Roosevelt Boo MD     Admission Diagnoses: Pregnancy; Abdominal pain in pregnancy    Condition on Discharge: Stable    Procedures: , management of PP hemorrhage, placement of Bakri    Disposition: to home    Follow up: Follow-up Appointments   Procedures    FOLLOW UP VISIT Appointment in: 6 Weeks     Standing Status:   Standing     Number of Occurrences:   1     Order Specific Question:   Appointment in     Answer:   6 Weeks        Discharge Diagnoses:   Information for the patient's :  Rachele Huertas, Female [[id number]]   Delivery of a 8 lb 2.5 oz (3.7 kg) female infant via Vaginal, Spontaneous Delivery on 10/1/2018 at 12:20 PM  by . Apgars were 9 and 9. Additional Diagnoses:   Hospital Problems  Date Reviewed: 2018          Codes Class Noted POA    Abdominal pain in pregnancy ICD-10-CM: O26.899, R10.9  ICD-9-CM: 646.80, 789.00  10/1/2018 Unknown             Lab Results   Component Value Date/Time    Rubella, External immune 2018    GrBStrep, External Negative 2018       Hospital Course: Postpartum course was complicated by acute blood loss anemia and postpartum hemorrhage, which added 0 days to the patient's length of stay. It was managed by uterine exploration, medications and a Bakri balloon for < 24 hours. She remained stable postpartum and her anemia was stable and tolerated. She was given gentamycin and clindamycin postpartum x 24 hours. Patient Instructions:   Current Discharge Medication List      START taking these medications    Details   ibuprofen (MOTRIN) 600 mg tablet Take 1 Tab by mouth every six (6) hours as needed for Pain. Take with food.   Qty: 30 Tab, Refills: 0      HYDROcodone-acetaminophen (NORCO) 5-325 mg per tablet Take 1 Tab by mouth every four (4) hours as needed for Pain. Max Daily Amount: 6 Tabs. Indications: Pain  Qty: 10 Tab, Refills: 0    Associated Diagnoses:  (spontaneous vaginal delivery)         CONTINUE these medications which have NOT CHANGED    Details   esomeprazole (NEXIUM) 20 mg capsule Take  by mouth daily. PROCTOZONE-HC 2.5 % rectal cream APPLY RECTALLY Q 12 H  Refills: 0      ferrous sulfate (SLOW RELEASE IRON PO) Take  by mouth. butalbital-acetaminophen-caffeine (FIORICET, ESGIC) -40 mg per tablet Take 1 Tab by mouth every four (4) hours as needed for Pain. Qty: 30 Tab, Refills: 0               Reference my discharge instructions.       Signed By:  Diann Lopez MD     October 3, 2018

## 2018-10-03 NOTE — LACTATION NOTE
This note was copied from a baby's chart. Mother resting in bed holding baby. Mother states baby has been breastfeeding well. Talked with mother some more regarding pumping and return to work as mother verbalized concerns yesterday. Discharge teaching completed. Parents questions answered. Chart shows numerous feedings, void, stool WNL. Discussed importance of monitoring outputs and feedings on first week of life. Discussed ways to tell if baby is  getting enough breast milk, ie  voids and stools, change in color of stool, and return to birth wt within 2 weeks. Follow up with pediatrician visit for weight check in 1-2 days (per AAP guidelines.)  Encouraged to call [facility]  [phone] or [facility] at [phone] for any questions/problems that arise. Mother also given breastfeeding support group dates and times for any future needs Pt will successfully establish breastfeeding by feeding in response to early feeding cues  
or wake every 3h, will obtain deep latch, and will keep log of feedings/output. Taught to BF at hunger cues and or q 2-3 hrs and to offer 10-20 drops of hand expressed colostrum at any non-feeds. Breast Assessment Left Breast: Medium Left Nipple: Everted, Intact Right Breast: Medium Right Nipple: Everted, Intact Breast- Feeding Assessment Attends Breast-Feeding Classes: No 
Breast-Feeding Experience: Yes (1 month) Breast Trauma/Surgery: No 
Type/Quality: Good Lactation Consultant Visits Breast-Feedings: Good  (per mother) Mother/Infant Observation Mother Observation: Breast comfortable Infant Observation: Rhythmic suck, Relaxed after feeding, Opens mouth, Lips flanged, upper, Lips flanged, lower, Latches nipple and aereolae, Frenulum checked, Feeding cues, Audible swallows

## 2018-10-03 NOTE — PROGRESS NOTES
Post-Partum Progress Note Patient doing well post-partum without significant complaint. She is voiding without difficulty, she reports normal lochia. Her pain is well controlled with oral pain medication. She is tolerating a general diet. Delivery information: 
Information for the patient's :  Mohinder Sandhu, Female [[id number]] Delivery of a 8 lb 2.5 oz (3.7 kg) female infant via Vaginal, Spontaneous Delivery on 10/1/2018 at 12:20 PM  by . Apgars were 9 and 9. Vitals:  No data found. Temp (24hrs), Av.3 °F (36.8 °C), Min:98 °F (36.7 °C), Max:98.5 °F (36.9 °C) Visit Vitals  /70 (BP 1 Location: Right arm, BP Patient Position: At rest)  Pulse 82  Temp 98.5 °F (36.9 °C)  Resp 18  Ht 5' 7\" (1.702 m)  Wt 182 lb (82.6 kg)  SpO2 96%  BMI 28.51 kg/m2 Exam:   
General: Patient without distress. Abdomen: soft, fundus firm at level of umbilicus, nontender Lower extremities: negative for cords or tenderness. Labs: No results found for this or any previous visit (from the past 24 hour(s)). Assessment: 1. Postpartum S/P spontaneous vaginal delivery 2. Patient doing well s/p PPH and bakri 3. POP anemia, acute intraoperative blood loss, stable Plan: 1. Continue routine postpartum care 2. Routine perineal care and maternal education 3. Oral pain medications and bowel regimen as needed 4. Bleeding precautions Yesenia Rivera MD

## 2018-10-03 NOTE — PROGRESS NOTES
Delivery summary completed. Spoke to [facility] on the phone to fill in blanks of time patient complete and started pushing.

## 2018-10-03 NOTE — PROGRESS NOTES
Patient discharge prescriptions & instructions given. Verbalized understanding. All questions answered. No distress noted. E-sign copy of discharge instructions in electronic chart.

## 2018-10-03 NOTE — DISCHARGE INSTRUCTIONS
[facility]  [email or web address]  [phone]    Aruna Ortiz MD, FACOG     POST DELIVERY DISCHARGE INSTRUCTIONS  FROM YOUR PHYSICIAN    Name: Briana Mccarthy  YOB: 1985    General:     Read all discharge information provided by the hospital    Diet/Diet Restrictions:  Eat healthy meals and snacks as desired. Eat foods that are high in fiber and low in fat and cholesterol. Drink eight 8-ounce glasses of water daily; avoid excessive caffeine intake. [email or web address]://www.kat-kohler.org/. html  EliteClients.be    Medications:   See discharge medication list and read instructions carefully. Breast Feeding:  See instructions from your lactation consult. Call [phone] for more information or to locate a lactation consultant. [email or web address].abraham.info/    Vaccines:  If you received the MMR vaccine postpartum you should wait three months until you get pregnant again. You, and close contacts, should make sure that the Tdap vaccine is up to date. This vaccine can decrease the risk of your baby getting pertussis or \"whooping cough. \"    You, and close contacts, should receive the influenza vaccine during flu season when appropriate. SalaryStart.tn    Tobacco Use: If you (or other people around the baby) smoke or use tobacco products, please try to  use and quit to improve your health and decrease risk to your baby. LimitBuy.nl. htm    Swelling in your Legs:  There are many fluid changes after delivery and you may have more swelling the first few days after delivery  Continue to drink plenty of water, avoid sitting or standing in one position for too long and elevate your feet above your heart, to help reduce some the pressure you may be feeling in your ankles and legs.      Section Incision:  Steri-strips or tape strips may be removed gently at home approximately 7- 10 days after surgery. Soaking the strips with a warm, wet cloth or taking a shower may make the strips easier to remove. Metal staples are usually removed within 3 to 10 days, either before you leave the hospital or in the office. Make an appointment if needed. Insorb absorbable staples may be used under the skin but you may see small white pieces as they dissolve. Skin glue or dermabond will fall off with time. Abdominal incisions should be kept clean by showering. It is not necessary to put soap on the incision; plain tap water is adequate. Avoid scrubbing the area and pat dry. The way your scar looks will change over time and may not reach its final appearance for up to a year. The area may feel either numb or sensitive to touch, which is normal.      Anemia:  Your results show some anemia, or low blood count. You should start, or add, an over the counter elemental iron supplement of 45-65 mg. Consider 'Slow FE' or ferrous sulfate 325 mg once a day for at least three months. Also take vitamin C 250 mg and a daily prenatal vitamin to help anemia. Add a stool softener, like docusate or colace 100 mg (2x/day) to avoid constipation. If you do not tolerate supplements you can try blackstrap molasses (1 tbsp=27mg elemental iron). Physical Activity / Restrictions / Safety:     Avoid heavy lifting, no more that 10 pounds, for 2-3 weeks. No driving while taking narcotic pain medication, of if you can not slam on the brakes. No intercourse for 4-6 weeks, no douching or tampon use until seen by your doctor for your postpartum visit. Use condoms as needed for contraception with sexual activity. You may resume normal exercise after you are cleared by your physician at your postpartum check. You may walk for exercise, as tolerated.      Discharge Instructions/Special Treatment/Home Care Needs: Continue your prenatal vitamins while breast feeding or pumping. Continue to use a squirt bottle with warm water on your perineum/bottom/episiotomy after each bathroom use until bleeding stops. Take stool softeners daily. For example, docusate over the counter stool softener. This is especially important if you are taking narcotic pain medications, because they can cause constipation. Call your doctor for the following: If you have a fever over 100.4 degrees by mouth on two readings. If you have persistent vaginal bleeding heavier than a heavy menstrual period or persistent large clots or if you are bleeding so heavy it is making you feel weak. It is normal to pass larger clots when you first get out of bed: but if they persist, notify your physician. If you have red streaks or increased swelling of legs, painful red streaks on your breast.  If you have painful urination, or increased pain, redness or discharge with your incision. If you have any questions or concerns. Pain Management:     Take Acetaminophen (Tylenol), Ibuprofen (Advil, Motrin), prescribed pain medications as directed for pain. Do not take Perocet with Tylenol, they both contain acetaminophen. Use a warm water Sitz bath 3 times daily to relieve episiotomy, bottom/perineum or hemorrhoidal discomfort. Apply heating pad to  incision as needed. For hemorrhoidal discomfort, you can use Tucks and Anusol cream as needed and directed.     NSAID information for patients:  Richelle    Pain medication/narcotic information for patients:   Take your medicine exactly as prescribed   Store your medicine away from children and in a safe  place   Do not give your medicine to others   Do not drink alcohol while taking this medicine    Follow-Up Care:     Appointment with MD:  Dr. Peck Burn  [phone]  Schedule your postpartum visit for six weeks        Additional Discharge Instructions    Please read all of your discharge instructions  Follow all of your medication instructions carefully  Call our office on the next business day to schedule your follow-up appointment  If you have any questions or concerns, please contact us at [phone] or if the situation is urgent contact [phone]  Become a [facility] My Chart user so you can access information, results and appointments: go to [email or web address]. The [facility] is to bring compassion to healthcare and to be good help to those in need. We aim at providing quality healthcare with an emphasis on respect, justice, compassion, stewardship, integrity, growth and innovation.     If you did not receive excellent communication, compassionate care and an outstanding patient experience, please notify Efren Arthur at [email or web address] or [phone] or discuss your concerns with me at your next visit so that we can meet our mission and your expectations    Anant Garcia MD  [address] [facility], [address]  [email or web address]   [phone]   Good Help to Those in [facility]

## 2018-10-03 NOTE — PROGRESS NOTES
Bedside shift change report given to [address] [place] (oncoming nurse) by Steve العراقي (offgoing nurse). Report included the following information SBAR and MAR.

## 2018-10-03 NOTE — PROGRESS NOTES
0430 This RN entered room to check MOB vital signs. MOB attempting to breastfeed. MOB stated that she would use her call bell after feeding infant. 5208 This RN entered room to check MOB vital signs. MOB again stated that she would use her call bell when she was ready to have her vital signs checked. 3967 This RN entered room to check MOB vital signs. MOB standing in room holding infant and trying to calm infant. MOB again stated that she would use her call bell when she was ready to have her vital signs checked.

## 2018-10-03 NOTE — L&D DELIVERY NOTE
Delivery Summary    Patient: Daphne Amador MRN: [id number]  SSN: [id number]    YOB: 1985  Age: 29 y.o. Sex: female        Labor Events:    Labor:      Rupture Date: 10/1/2018    Rupture Time: 11:00 AM    Rupture Type: AROM    Amniotic Fluid Volume: Moderate     Amniotic Fluid Description:  Amniotic Fluid Odor: Meconium    None     Induction:           Induction Date:        Induction Time:       Indications for Induction:       Augmentation: AROM    Augmentation Date: 10/1/2018    Augmentation Time: 11:00 AM    Indications for Augmentation:      Cervical Ripening:            Rupture Identifier:       Labor complications: None     Additional complications:           Delivery Events:  Episiotomy: None    Indications for Episiotomy:      Laceration(s): Second degree perineal       Repaired: Yes     Number of Repair Packets: 1    Suture Type and Size: Rapide 3-0        Estimated Blood Loss (ml):          Information for the patient's newbornReji Morse Female [[id number]]     Delivery Summary - Baby    Delivery Date: 10/1/2018   Delivery Time: 12:20 PM   Delivery Type: Vaginal, Spontaneous Delivery  Sex:  female  Gestational Age: 44w3d  Delivery Clinician:  Sharla Barron  Living?: Living   Delivery Location: L&D             APGARS  One minute Five minutes Ten minutes   Skin Color: 1    1       Heart Rate: 2   2         Reflex Irritability: 2   2         Muscle Tone: 2   2       Respiration: 2   2         Total: 9   9           Presentation: Vertex  Position: Right Occiput Anterior  Resuscitation Method:  Suctioning-bulb;Suctioning-deep; Tactile Stimulation     Meconium Stained:  Thick    Cord Information: 3 Vessels   Complications: Nuchal Cord Without Compressions  Cord Blood Sent?:  No    Blood Gases Sent?:  No    Placenta:  Date/Time: 10/1 12:25 PM  Removal: Spontaneous      Appearance: Normal      Measurements:  Birth Weight: 8 lb 2.5 oz (3.7 kg)    Birth Length:     Head Circumference:       Chest Circumference:      Abdominal Girth: Other Providers:   CARLTON CARCAMO;CRIS PICKETT;GREG NIEVES;GREG NIEVES;NICHOLAS WADDELL Obstetrician;Primary Nurse;Primary [place] Nurse;Nicu Nurse;Neonatologist;Anesthesiologist;Crna;Nurse Practitioner;Midwife;Nursery Nurse           Cord Blood Results:  Information for the patient's :  Suraj Mathews, Female [[id number]]   No results found for: Lark Backbone, PCTDIG, BILI, ABORHEXT, [address]    Information for the patient's :  Suraj Mathews, Female [[id number]]   No results found for: APH, APCO2, APO2, AHCO3, ABEC, ABDC, O2ST, SITE, [place], PHI, [place], PO2I, HCO3I, SO2I, IBD     Information for the patient's newbornSaria Albarran, Female [[id number]]   No results found for: EPHV, PCO2V, PO2V, HCO3V, O2STV, EBDV      See delivery note/progress notes,  complicated by postpartum hemorrhage.

## 2018-10-13 NOTE — ANESTHESIA PROCEDURE NOTES
Epidural Block Performed by: Brielle Garrison Authorized by: Gui Scott  
 
Pre-Procedure Indication: labor epidural   
Preanesthetic Checklist: patient identified, risks and benefits discussed, anesthesia consent, timeout performed and anesthesia consent Timeout Time: 09:52 Epidural:  
Patient position:  Seated Prep region:  Lumbar Prep: Betadine Location:  L3-4 Needle and Epidural Catheter:  
Needle Type:  Tuohy Needle Gauge:  17 G Injection Technique:  Loss of resistance using air Attempts:  1 Catheter Size:  18 G Catheter at Skin Depth (cm):  9 Depth in Epidural Space (cm):  5 Events: no blood with aspiration, no cerebrospinal fluid with aspiration, no paresthesia and negative aspiration test   
Test Dose:  Lidocaine 1.5% w/ epi and negative Assessment:  
Catheter Secured:  Tegaderm and tape Insertion:  Uncomplicated Patient tolerance:  Patient tolerated the procedure well with no immediate complications Epidural easily placed x1 attempt KEVIN at 4cm Catheter easily threaded to 9 cm. Neg heme, neg paresthesia, neg csf
DISCHARGE

## 2018-10-25 ENCOUNTER — TELEPHONE (OUTPATIENT)
Dept: OBGYN CLINIC | Age: 33
End: 2018-10-25

## 2018-10-25 ENCOUNTER — HOSPITAL ENCOUNTER (EMERGENCY)
Age: 33
Discharge: HOME | End: 2018-10-25
Attending: EMERGENCY MEDICINE
Payer: COMMERCIAL

## 2018-10-25 VITALS
BODY MASS INDEX: 24.33 KG/M2 | DIASTOLIC BLOOD PRESSURE: 83 MMHG | WEIGHT: 155 LBS | HEART RATE: 83 BPM | SYSTOLIC BLOOD PRESSURE: 126 MMHG | TEMPERATURE: 99.1 F | OXYGEN SATURATION: 97 % | HEIGHT: 67 IN | RESPIRATION RATE: 16 BRPM

## 2018-10-25 DIAGNOSIS — N61.0 ACUTE MASTITIS OF RIGHT BREAST: Primary | ICD-10-CM

## 2018-10-25 DIAGNOSIS — R50.9 ACUTE FEBRILE ILLNESS: ICD-10-CM

## 2018-10-25 LAB
ALBUMIN SERPL-MCNC: 3.2 G/DL (ref 3.5–5)
ALBUMIN/GLOB SERPL: 0.8 {RATIO} (ref 1.1–2.2)
ALP SERPL-CCNC: 107 U/L (ref 45–117)
ALT SERPL-CCNC: 48 U/L (ref 12–78)
ANION GAP SERPL CALC-SCNC: 9 MMOL/L (ref 5–15)
AST SERPL-CCNC: 58 U/L (ref 15–37)
BASOPHILS # BLD: 0 K/UL (ref 0–0.1)
BASOPHILS NFR BLD: 0 % (ref 0–1)
BILIRUB SERPL-MCNC: 0.4 MG/DL (ref 0.2–1)
BUN SERPL-MCNC: 19 MG/DL (ref 6–20)
BUN/CREAT SERPL: 20 (ref 12–20)
CALCIUM SERPL-MCNC: 8.7 MG/DL (ref 8.5–10.1)
CHLORIDE SERPL-SCNC: 103 MMOL/L (ref 97–108)
CO2 SERPL-SCNC: 25 MMOL/L (ref 21–32)
CREAT SERPL-MCNC: 0.95 MG/DL (ref 0.55–1.02)
DIFFERENTIAL METHOD BLD: ABNORMAL
EOSINOPHIL # BLD: 0 K/UL (ref 0–0.4)
EOSINOPHIL NFR BLD: 0 % (ref 0–7)
ERYTHROCYTE [DISTWIDTH] IN BLOOD BY AUTOMATED COUNT: 15 % (ref 11.5–14.5)
GLOBULIN SER CALC-MCNC: 4.1 G/DL (ref 2–4)
GLUCOSE SERPL-MCNC: 122 MG/DL (ref 65–100)
HCT VFR BLD AUTO: 35.4 % (ref 35–47)
HGB BLD-MCNC: 11.2 G/DL (ref 11.5–16)
IMM GRANULOCYTES # BLD: 0 K/UL (ref 0–0.04)
IMM GRANULOCYTES NFR BLD AUTO: 0 % (ref 0–0.5)
LACTATE BLD-SCNC: 1 MMOL/L (ref 0.4–2)
LYMPHOCYTES # BLD: 0.6 K/UL (ref 0.8–3.5)
LYMPHOCYTES NFR BLD: 6 % (ref 12–49)
MCH RBC QN AUTO: 26.4 PG (ref 26–34)
MCHC RBC AUTO-ENTMCNC: 31.6 G/DL (ref 30–36.5)
MCV RBC AUTO: 83.3 FL (ref 80–99)
MONOCYTES # BLD: 0.4 K/UL (ref 0–1)
MONOCYTES NFR BLD: 4 % (ref 5–13)
NEUTS SEG # BLD: 9.2 K/UL (ref 1.8–8)
NEUTS SEG NFR BLD: 90 % (ref 32–75)
NRBC # BLD: 0 K/UL (ref 0–0.01)
NRBC BLD-RTO: 0 PER 100 WBC
PLATELET # BLD AUTO: 228 K/UL (ref 150–400)
PMV BLD AUTO: 11.1 FL (ref 8.9–12.9)
POTASSIUM SERPL-SCNC: 4.7 MMOL/L (ref 3.5–5.1)
PROT SERPL-MCNC: 7.3 G/DL (ref 6.4–8.2)
RBC # BLD AUTO: 4.25 M/UL (ref 3.8–5.2)
RBC MORPH BLD: ABNORMAL
RBC MORPH BLD: ABNORMAL
SODIUM SERPL-SCNC: 137 MMOL/L (ref 136–145)
WBC # BLD AUTO: 10.2 K/UL (ref 3.6–11)

## 2018-10-25 PROCEDURE — 74011250636 HC RX REV CODE- 250/636: Performed by: EMERGENCY MEDICINE

## 2018-10-25 PROCEDURE — 99285 EMERGENCY DEPT VISIT HI MDM: CPT

## 2018-10-25 PROCEDURE — 87040 BLOOD CULTURE FOR BACTERIA: CPT | Performed by: EMERGENCY MEDICINE

## 2018-10-25 PROCEDURE — 74011250637 HC RX REV CODE- 250/637: Performed by: EMERGENCY MEDICINE

## 2018-10-25 PROCEDURE — 80053 COMPREHEN METABOLIC PANEL: CPT | Performed by: EMERGENCY MEDICINE

## 2018-10-25 PROCEDURE — 96361 HYDRATE IV INFUSION ADD-ON: CPT

## 2018-10-25 PROCEDURE — 83605 ASSAY OF LACTIC ACID: CPT

## 2018-10-25 PROCEDURE — 96360 HYDRATION IV INFUSION INIT: CPT

## 2018-10-25 PROCEDURE — 36415 COLL VENOUS BLD VENIPUNCTURE: CPT | Performed by: EMERGENCY MEDICINE

## 2018-10-25 PROCEDURE — 85025 COMPLETE CBC W/AUTO DIFF WBC: CPT | Performed by: EMERGENCY MEDICINE

## 2018-10-25 PROCEDURE — 74011250637 HC RX REV CODE- 250/637: Performed by: PHYSICIAN ASSISTANT

## 2018-10-25 RX ORDER — DICLOXACILLIN SODIUM 250 MG/1
500 CAPSULE ORAL
Qty: 40 CAP | Refills: 1 | Status: SHIPPED | OUTPATIENT
Start: 2018-10-25 | End: 2019-10-22

## 2018-10-25 RX ORDER — DICLOXACILLIN SODIUM 250 MG/1
500 CAPSULE ORAL
Status: COMPLETED | OUTPATIENT
Start: 2018-10-25 | End: 2018-10-25

## 2018-10-25 RX ORDER — ACETAMINOPHEN 325 MG/1
975 TABLET ORAL
Status: COMPLETED | OUTPATIENT
Start: 2018-10-25 | End: 2018-10-25

## 2018-10-25 RX ADMIN — ACETAMINOPHEN 975 MG: 325 TABLET, FILM COATED ORAL at 19:57

## 2018-10-25 RX ADMIN — SODIUM CHLORIDE 1000 ML: 900 INJECTION, SOLUTION INTRAVENOUS at 20:00

## 2018-10-25 RX ADMIN — DICLOXACILLIN SODIUM 500 MG: 250 CAPSULE ORAL at 22:55

## 2018-10-25 NOTE — TELEPHONE ENCOUNTER
rx sent. Please check that she is OK with penicillins. Allergy to ceph drugs crosses over to cillins about 5% of the time.

## 2018-10-25 NOTE — ED TRIAGE NOTES
Pt reports chills and fever starting last night, also reports pain in right breast.  Pt reports having 3month old at home that she has been breast feeding.

## 2018-10-25 NOTE — TELEPHONE ENCOUNTER
Call received at 1:17PM    TP patient  29year old patient delivered on 10/1/18. Patient calling to say that she has a temperature of 102.6, feeling off , flu like symptoms, and right breast is sore and tender. Patient feels like it is affecting her milk supply. Patient has had her flu shot and reports no one in the house is sick    Patient advised to contact [facility] Prader and phone number provided of [phone]        Patient states pcp office told her to go to ER. Patient is in the lobby of [facility].   This nurse advised patient per work in [place], Dr. Josette Mejia that he would send in antibiotics    Patient verbalized understanding    Please advise

## 2018-10-25 NOTE — TELEPHONE ENCOUNTER
Patient advised of work in [place], Dr. Josette Mejia , recommendations and prescription sent to patient preferred pharmacy. Patient state she is ok with taking Penicillins. Patient advised to contact the office if her symptoms do not improve.     Patient has called Rome Prader and is waiting for her to call her back

## 2018-10-25 NOTE — ED PROVIDER NOTES
7:30 PM 
I have evaluated the patient as the Provider in Triage. I have reviewed Her vital signs and the triage nurse assessment. I have talked with the patient and any available family and advised that I am the provider in triage and have ordered the appropriate study to initiate their work up based on the clinical presentation during my assessment. I have advised that the patient will be accommodated in the Main ED as soon as possible. I have also requested to contact the triage nurse or myself immediately if the patient experiences any changes in their condition during this brief waiting period. Delivery 10/1 is breast feeding. Temp 102.6 today + breast pain. Had vaginal delivery. Ob called in dicloxacillin. Last motrin at 6 pm. No abd pain Trevon Howe MD 
 
 
 
  
 
Past Medical History:  
Diagnosis Date  Acne  Anemia  Contusion of left knee 11/6/2013  Eczema 11/6/2013  GERD (gastroesophageal reflux disease) 1/27/2015  Migraine 11/6/2013  Right knee meniscal tear   
 resolved w physical therapy. BBall injury  Right wrist fracture 1997, 2000  
 x2  Scoliosis   
 mild  Screening for cervical cancer 2/2010, 3/8/2012  
 normal. Dr. Mulugeta Merediht No past surgical history on file. Family History:  
Problem Relation Age of Onset  Hypertension Mother  Hypertension Father  High Cholesterol Father  Asthma Sister  Cancer Maternal Grandmother   
     colon  Diabetes Paternal Grandfather   
     type 2  
 Heart Attack Paternal Grandfather   
     fatal  
 
 
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  
 Drug use: No  
 Sexual activity: Yes  
  Partners: Male Birth control/protection: None Other Topics Concern  Not on file Social History Narrative  Not on file ALLERGIES: Bactrim [sulfamethoprim]; Ceftin [cefuroxime axetil]; and Gardasil [h papillomavirus vac,qval (pf)] Review of Systems There were no vitals filed for this visit. Physical Exam  
Constitutional: She is oriented to person, place, and time. She appears well-developed and well-nourished. She is active. Non-toxic appearance. No distress. HENT:  
Head: Normocephalic and atraumatic. Eyes: Conjunctivae are normal. Pupils are equal, round, and reactive to light. Right eye exhibits no discharge. Left eye exhibits no discharge. Neck: Normal range of motion and full passive range of motion without pain. Neck supple. No tracheal tenderness present. Cardiovascular: Normal rate, regular rhythm, normal heart sounds, intact distal pulses and normal pulses. Exam reveals no gallop and no friction rub. No murmur heard. Pulmonary/Chest: Effort normal and breath sounds normal. No respiratory distress. She has no wheezes. She has no rales. She exhibits no tenderness. Abdominal: Soft. Bowel sounds are normal. She exhibits no distension. There is no tenderness. There is no rebound and no guarding. Musculoskeletal: Normal range of motion. She exhibits no edema or tenderness. Neurological: She is alert and oriented to person, place, and time. She has normal strength. No cranial nerve deficit or sensory deficit. Coordination normal.  
Skin: Skin is warm, dry and intact. No abrasion and no rash noted.  She is not diaphoretic. No erythema. Psychiatric: She has a normal mood and affect. Her speech is normal and behavior is normal. Cognition and memory are normal.  
Nursing note and vitals reviewed. MDM Number of Diagnoses or Management Options Diagnosis management comments:  
Ddx: mastitis, sepsis, electrolyte abnormality Amount and/or Complexity of Data Reviewed Clinical lab tests: ordered and reviewed Review and summarize past medical records: yes Patient Progress Patient progress: stable Procedures I discussed patient's PMH, exam findings as well as careplan with the ER attending who agrees with care plan. Des Alonso PA-C 
 
 
  
LABORATORY TESTS: 
Recent Results (from the past 12 hour(s)) CBC WITH AUTOMATED DIFF Collection Time: 10/25/18  7:47 PM  
Result Value Ref Range WBC 10.2 3.6 - 11.0 K/uL  
 RBC 4.25 3.80 - 5.20 M/uL  
 HGB 11.2 (L) 11.5 - 16.0 g/dL HCT 35.4 35.0 - 47.0 % MCV 83.3 80.0 - 99.0 FL  
 MCH 26.4 26.0 - 34.0 PG  
 MCHC 31.6 30.0 - 36.5 g/dL  
 RDW 15.0 (H) 11.5 - 14.5 % PLATELET 367 423 - 152 K/uL MPV 11.1 8.9 - 12.9 FL  
 NRBC 0.0 0  WBC ABSOLUTE NRBC 0.00 0.00 - 0.01 K/uL NEUTROPHILS 90 (H) 32 - 75 % LYMPHOCYTES 6 (L) 12 - 49 % MONOCYTES 4 (L) 5 - 13 % EOSINOPHILS 0 0 - 7 % BASOPHILS 0 0 - 1 % IMMATURE GRANULOCYTES 0 0.0 - 0.5 % ABS. NEUTROPHILS 9.2 (H) 1.8 - 8.0 K/UL  
 ABS. LYMPHOCYTES 0.6 (L) 0.8 - 3.5 K/UL  
 ABS. MONOCYTES 0.4 0.0 - 1.0 K/UL  
 ABS. EOSINOPHILS 0.0 0.0 - 0.4 K/UL  
 ABS. BASOPHILS 0.0 0.0 - 0.1 K/UL  
 ABS. IMM. GRANS. 0.0 0.00 - 0.04 K/UL  
 DF SMEAR SCANNED    
 RBC COMMENTS ANISOCYTOSIS 1+ 
    
 RBC COMMENTS MICROCYTOSIS 
1+ METABOLIC PANEL, COMPREHENSIVE Collection Time: 10/25/18  7:47 PM  
Result Value Ref Range Sodium 137 136 - 145 mmol/L Potassium 4.7 3.5 - 5.1 mmol/L Chloride 103 97 - 108 mmol/L  
 CO2 25 21 - 32 mmol/L  Anion gap 9 5 - 15 mmol/L  
 Glucose 122 (H) 65 - 100 mg/dL BUN 19 6 - 20 MG/DL Creatinine 0.95 0.55 - 1.02 MG/DL  
 BUN/Creatinine ratio 20 12 - 20 GFR est AA >60 >60 ml/min/1.73m2 GFR est non-AA >60 >60 ml/min/1.73m2 Calcium 8.7 8.5 - 10.1 MG/DL Bilirubin, total 0.4 0.2 - 1.0 MG/DL  
 ALT (SGPT) 48 12 - 78 U/L  
 AST (SGOT) 58 (H) 15 - 37 U/L Alk. phosphatase 107 45 - 117 U/L Protein, total 7.3 6.4 - 8.2 g/dL Albumin 3.2 (L) 3.5 - 5.0 g/dL Globulin 4.1 (H) 2.0 - 4.0 g/dL A-G Ratio 0.8 (L) 1.1 - 2.2 POC LACTIC ACID Collection Time: 10/25/18  7:50 PM  
Result Value Ref Range Lactic Acid (POC) 1.0 0.4 - 2.0 mmol/L MEDICATIONS GIVEN: 
Medications  
sodium chloride 0.9 % bolus infusion 1,000 mL (0 mL IntraVENous IV Completed 10/25/18 2134)  
acetaminophen (TYLENOL) tablet 975 mg (975 mg Oral Given 10/25/18 1957) CONSULT NOTE:  
9:54 PM 
Reji Hawkins PA-C spoke with Dr. Shari Headley, Specialty: OB/GYN on call for [facility] Discussed pt's hx, disposition, and available diagnostic and imaging results. Reviewed care plans. Consultant agrees with plans as outlined. He states patient can f/u outpatient, to call the office tomorrow to be seen and continue dicloxacillin as directed. Written by Reji Hawkins PA-C. Patient pumped while in ER. Feeling better, no longer tachycardic once fever reduced. She is tolerating PO, no vomiting, no leukocytosis, normal lactic acid. GYN comfortable with outpatient f/u, return precautions discussed. Reji Hawkins PA-C 
DISCHARGE NOTE: 
11pm 
The patient's results have been reviewed with them and/or available family.  Patient and/or family verbally conveyed their understanding and agreement of the patient's signs, symptoms, diagnosis, treatment and prognosis and additionally agree to follow up as recommended in the discharge instructions or to return to the Emergency Room should their condition change prior to their follow-up appointment. The patient/family verbally agrees with the care-plan and verbally conveys that all of their questions have been answered. The discharge instructions have also been provided to the patient and/or family with some educational information regarding the patient's diagnosis as well a list of reasons why the patient would want to return to the ER prior to their follow-up appointment, should their condition change. Plan: 
1. F/U with GYN 2. Continue dicloxacillin; continue ibuprofen / tylenol 3. Continue to pump / nurse frequently, warm moist compresses encouraged Return precautions discussed and advised to return to ER if worse

## 2018-10-26 NOTE — ED NOTES
YANA Ayala gave and reviewed discharge instructions with the patient. The patient verbalized understanding. The patient was given opportunity for questions. Patient discharged in stable condition to the waiting room.

## 2018-10-26 NOTE — DISCHARGE INSTRUCTIONS
Mastitis: Care Instructions  Your Care Instructions  Mastitis is an inflammation of the breast. It occurs most often in women who are breastfeeding, but it can affect any woman. Mastitis can be caused by poor milk flow from the breast. When milk builds up in a breast, it leaks into the nearby breast tissue. Infection can also develop when the nipples become cracked or irritated. The tissue can then become infected with bacteria. Antibiotics can usually cure mastitis. For women who are nursing, continued breastfeeding (or pumping) can help. If mastitis is not treated, a pocket of pus may form in the breast and need to be drained. Follow-up care is a key part of your treatment and safety. Be sure to make and go to all appointments, and call your doctor if you are having problems. It's also a good idea to know your test results and keep a list of the medicines you take. How can you care for yourself at home? · If your doctor prescribed antibiotics, take them as directed. Do not stop taking them just because you feel better. You need to take the full course of antibiotics. · If you are breastfeeding, continue breastfeeding or pumping breast milk. It is important to empty your breasts regularly, every 2 to 3 hours while you are awake. These tips may help:  ? Before breastfeeding, place a warm, wet washcloth over your breast for about 15 minutes. Try this at least 3 times a day. This increases milk flow in the breast. Massaging the affected breast may also increase milk flow. ? Breastfeed on both sides. Try to start with your healthy breast first. Then, after your milk is flowing, breastfeed from the affected breast until it feels soft. You should empty this breast completely. Then switch back to the healthy breast and breastfeed until your baby has finished. ? Pump or hand-express a small amount of breast milk before breastfeeding if your breasts are too full with milk.  This will make your breasts less full and may make it easier for your baby to latch on to your breast.  ? Pump or express milk from the affected breast if it hurts too much to breastfeed. · Take an over-the-counter pain medicine, such as acetaminophen (Tylenol) or ibuprofen (Advil, Motrin) to relieve pain and fever. Read and follow all instructions on the label. · Do not take two or more pain medicines at the same time unless the doctor told you to. Many pain medicines have acetaminophen, which is Tylenol. Too much acetaminophen (Tylenol) can be harmful. · Rest as much as possible. · Drink extra fluids. · If pus is draining from your infected breast, wash the nipple gently and let it air-dry before you put your bra back on. A disposable breast pad placed in the bra cup may absorb the pus. When should you call for help? Call your doctor now or seek immediate medical care if:    · You have worse symptoms of a breast infection, such as:  ? Increased pain, swelling, redness, or warmth around a breast.  ? Red streaks leading from a breast.  ? Pus draining from a breast.  ? A fever.    Watch closely for changes in your health, and be sure to contact your doctor if:    · You do not get better as expected. Where can you learn more? Go to http://remi-kannan.info/. Enter Y207 in the search box to learn more about \"Mastitis: Care Instructions. \"  Current as of: November 21, 2017  Content Version: 11.8  © 1655-6039 [employer]. Care instructions adapted under license by [employer] (which disclaims liability or warranty for this information). If you have questions about a medical condition or this instruction, always ask your healthcare professional. [place] [address] any warranty or liability for your use of this information. Learning About Fever  What is a fever? A fever is a high body temperature. It's one way your body fights being sick.  A fever shows that the body is responding to infection or other illnesses, both minor and severe. A fever is a symptom, not an illness by itself. A fever can be a sign that you are ill, but most fevers are not caused by a serious problem. You may have a fever with a minor illness, such as a cold. But sometimes a very serious infection may cause little or no fever. It is important to look at other symptoms, other conditions you have, and how you feel in general. In children, notice how they act and see what symptoms they complain of. What is a normal body temperature? A normal body temperature is about 98. 6ºF. Some people have a normal temperature that is a little higher or a little lower than this. Your temperature may be a little lower in the morning than it is later in the day. It may go up during hot weather or when you exercise, wear heavy clothes, or take a hot bath. Your temperature may also be different depending on how you take it. A temperature taken in the mouth (oral) or under the arm may be a little lower than your core temperature (rectal). What is a fever temperature? A core temperature of 100.4°F or above is considered a fever. What can cause a fever? A fever may be caused by:  · Infections. This is the most common cause of a fever. Examples of infections that can cause a fever include the flu, a kidney infection, or pneumonia. · Some medicines. · Severe trauma or injury, such as a heart attack, stroke, heatstroke, or burns. · Other medical conditions, such as arthritis and some cancers. How can you treat a fever at home? · Ask your doctor if you can take an over-the-counter pain medicine, such as acetaminophen (Tylenol), ibuprofen (Advil, Motrin), or naproxen (Aleve). Be safe with medicines. Read and follow all instructions on the label. · To prevent dehydration, drink plenty of fluids. Choose water and other caffeine-free clear liquids until you feel better.  If you have kidney, heart, or liver disease and have to limit fluids, talk with your doctor before you increase the amount of fluids you drink. Follow-up care is a key part of your treatment and safety. Be sure to make and go to all appointments, and call your doctor if you are having problems. It's also a good idea to know your test results and keep a list of the medicines you take. Where can you learn more? Go to http://remi-kannan.info/. Enter O405 in the search box to learn more about \"Learning About Fever. \"  Current as of: November 20, 2017  Content Version: 11.8  © 6318-7360 [employer], [employer]. Care instructions adapted under license by [employer] (which disclaims liability or warranty for this information). If you have questions about a medical condition or this instruction, always ask your healthcare professional. [address] any warranty or liability for your use of this information.

## 2018-10-31 LAB
BACTERIA SPEC CULT: NORMAL
SERVICE CMNT-IMP: NORMAL

## 2018-11-12 ENCOUNTER — OFFICE VISIT (OUTPATIENT)
Dept: OBGYN CLINIC | Age: 33
End: 2018-11-12

## 2018-11-12 VITALS
WEIGHT: 155.25 LBS | DIASTOLIC BLOOD PRESSURE: 72 MMHG | BODY MASS INDEX: 24.37 KG/M2 | SYSTOLIC BLOOD PRESSURE: 112 MMHG | HEIGHT: 67 IN

## 2018-11-12 RX ORDER — ACETAMINOPHEN AND CODEINE PHOSPHATE 120; 12 MG/5ML; MG/5ML
1 SOLUTION ORAL DAILY
Qty: 1 PACKAGE | Refills: 5 | Status: SHIPPED | OUTPATIENT
Start: 2018-11-12 | End: 2019-10-22

## 2018-11-12 NOTE — PATIENT INSTRUCTIONS
After Pregnancy: Exercises  Your Care Instructions  Here are some examples of exercises that can help after pregnancy. Start each exercise slowly. Ease off the exercise if you start to have pain. Your doctor or physical therapist will tell you when you can start these exercises and which ones will work best for you. How to do the exercises  Tummy tuck    1. Lie on your back, and place two fingers just inside your hip bones so you can feel your lower belly muscles. 2. Take a deep breath in.  3. As you breathe out, pull your belly button in toward your spine, as if you are trying to zip up a tight pair of jeans. You should feel your lower belly muscles pull slightly away from your fingers as the muscles tighten. 4. Hold for about 6 seconds, but do not hold your breath. 5. Repeat 8 to 12 times. 6. Repeat several times a day, and try to hold your lower belly muscles in for longer as you get stronger. 7. Practice doing this exercise while you are standing, such as when you are standing in line, or sitting. Heel slides    1. Lie on the floor with your knees bent, and place two fingers just inside your hip bones so you can feel your lower belly muscles. Your feet should be flat on the floor. 2. Pull your belly button in toward your spine. You should feel your lower belly muscles pull slightly away from your fingers as the muscles tighten. 3. Keep holding your belly button in as you slowly slide one foot along the floor until your leg is out straight. 4. Slowly slide the leg back to your starting position while making sure that you keep holding your belly button in.  5. Do not arch or move your back as you are doing this. Do not hold your breath. 6. Relax and repeat with your other leg. 7. Repeat 8 to 12 times. Knee-to-chest stretch    1. Lie on your back with one knee bent and the other leg straight.   2. Clasp your hands together under your bent knee and bring the knee to your chest. Keep your lower back pressed to the floor. 3. If it hurts your back to keep your opposite leg straight as you stretch, bend that knee too, and keep that foot flat on the floor. 4. Hold for at least 15 to 30 seconds. 5. Relax and lower the knee to the starting position. 6. Repeat with the other leg. 7. Repeat 2 to 4 times with each leg. Neck rotation    1. Sit in a firm chair, or stand up straight. 2. Keeping your chin level, turn your head to the right, and hold for 15 to 30 seconds. 3. Turn your head to the left and hold for 15 to 30 seconds. 4. Repeat 2 to 4 times to each side. Shoulder rolls    1. Sit comfortably with your feet shoulder-width apart. You can also do this exercise while standing. 2. Roll your shoulders up, then back, and then down in a smooth, circular motion. 3. Repeat 2 to 4 times. Midback stretch    1. Kneel on the floor, and sit back on your ankles. 2. Lean forward, place your hands on the floor, and stretch your arms out in front of you. Rest your head between your arms. 3. Gently push your chest toward the floor, reaching as far in front of you as possible. 4. Hold for 15 to 30 seconds. 5. Repeat 2 to 4 times. Back stretches    1. Get down on your hands and knees on the floor. 2. Relax your head and allow it to droop. Round your back up toward the ceiling until you feel a nice stretch in your upper, middle, and lower back. Hold this stretch for as long as it feels comfortable, or about 15 to 30 seconds. 3. Return to the starting position with a flat back while you are on your hands and knees. 4. Let your back sway by pressing your stomach toward the floor. Lift your buttocks toward the ceiling. 5. Hold this position for 15 to 30 seconds. 6. Repeat 2 to 4 times. Hamstring stretch (lying down)    1. Lie flat on your back with your legs straight. If you feel discomfort in your back, place a small towel roll under your lower back.   2. Holding the back of your leg, lift your leg straight up and toward your body until you feel a stretch at the back of your thigh. 3. Hold the stretch for at least 30 seconds. 4. Repeat 2 to 4 times. 5. Switch legs and repeat steps 1 through 4. Calf stretch    1. Stand facing a wall with your hands on the wall at about eye level. Put your leg about a step behind your other leg. 2. Keeping your back leg straight and your back heel on the floor, bend your front knee and gently bring your hip and chest toward the wall until you feel a stretch in the calf of your back leg. 3. Hold the stretch for 15 to 30 seconds. 4. Repeat 2 to 4 times. 5. Repeat steps 1 through 4, but this time keep your back knee bent. 6. Switch legs and repeat steps 1 through 5. Follow-up care is a key part of your treatment and safety. Be sure to make and go to all appointments, and call your doctor if you are having problems. It's also a good idea to know your test results and keep a list of the medicines you take. Where can you learn more? Go to http://remi-kannan.info/. Mars Almaguer in the search box to learn more about \"After Pregnancy: Exercises. \"  Current as of: November 21, 2017  Content Version: 11.8  © 1869-9085 Healthwise[employer]. Care instructions adapted under license by [employer] (which disclaims liability or warranty for this information). If you have questions about a medical condition or this instruction, always ask your healthcare professional. [place] [address] any warranty or liability for your use of this information.

## 2018-11-12 NOTE — PROGRESS NOTES
Laith Horan MD, [facility]     Postpartum visit    Chief Complaint   Patient presents with   [address]     6wk PP       Karen Marks is a 29 y.o. female  who presents for a postpartum exam.     She is now 6 weeks from a vaginal delivery delivery. No LMP recorded. She has had the following significant problems since her delivery: Had mastitis. Doing better now    She reports her mood as Good. The patient is breastfeeding/pumping breast milk for her baby. The patient would like to discuss options for birth control.  getting vasectomy    She is due for her next AE in 3 months. Past Medical History:   Diagnosis Date    Acne     Anemia     Contusion of left knee 2013    Eczema 2013    GERD (gastroesophageal reflux disease) 2015    Migraine 2013    Right knee meniscal tear     resolved w physical therapy. BBall injury    Right wrist fracture , 2000    x2    Scoliosis     mild    Screening for cervical cancer 2010, 3/8/2012    normal. Dr. Maura Harvey     History reviewed. No pertinent surgical history. Current Outpatient Medications   Medication Sig    norethindrone (MICRONOR) 0.35 mg tab Take 1 Tab by mouth daily.  ibuprofen (MOTRIN) 600 mg tablet Take 1 Tab by mouth every six (6) hours as needed for Pain. Take with food.  esomeprazole (NEXIUM) 20 mg capsule Take  by mouth daily.  PROCTOZONE-HC 2.5 % rectal cream APPLY RECTALLY Q 12 H    ferrous sulfate (SLOW RELEASE IRON PO) Take  by mouth.  butalbital-acetaminophen-caffeine (FIORICET, ESGIC) -40 mg per tablet Take 1 Tab by mouth every four (4) hours as needed for Pain.  dicloxacillin (DYNAPEN) 250 mg capsule Take 2 Caps by mouth Before breakfast, lunch, dinner and at bedtime.  HYDROcodone-acetaminophen (NORCO) 5-325 mg per tablet Take 1 Tab by mouth every four (4) hours as needed for Pain. Max Daily Amount: 6 Tabs.  Indications: Pain     No current facility-administered medications for this visit.       Allergies   Allergen Reactions    Bactrim [Sulfamethoprim] Hives    Ceftin [Cefuroxime Axetil] Hives    Gardasil [H Papillomavirus Vac,Qval (Pf)] Other (comments)     syncope     Family History   Problem Relation Age of Onset    Hypertension Mother     Hypertension Father     High Cholesterol Father     Asthma Sister     Cancer Maternal Grandmother         colon    Diabetes Paternal Grandfather         type 2    Heart Attack Paternal Grandfather         fatal     Social History     Socioeconomic History    Marital status:      Spouse name: Jossie    Number of children: 1    Years of education: Not on file    Highest education level: Not on file   Social Needs    Financial resource strain: Not on file    Food insecurity - worry: Not on file    Food insecurity - inability: Not on file   [employer] needs - medical: Not on file   [place] [place] needs - non-medical: Not on file   Occupational History     Employer: [employer]    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[employer])     Employer: [employer]   Tobacco Use    Smoking status: Never Smoker    Smokeless tobacco: Never Used   Substance and Sexual Activity    Alcohol use: No    Drug use: No    Sexual activity: Yes     Partners: Male     Birth control/protection: None   Other Topics Concern    Not on file   Social History Narrative    Not on file     OB History      Para Term  AB Living    2 2 2 0 0 2    SAB TAB Ectopic Molar Multiple Live Births    0 0 0   0 2        Obstetric Comments    PPH             Immunization History   Administered Date(s) Administered    Hep A Vaccine (Adult) 2014    Human Papillomavirus 2006    Influenza Vaccine 2013, 2014, 2017    Influenza Vaccine (Quad) 2018    TDAP Vaccine 2012    Tdap 2015, 2018       Review of Systems:  History obtained from the patient  General ROS: negative for - chills, fever or weight loss  Respiratory ROS: no cough, shortness of breath, or wheezing  Cardiovascular ROS: no chest pain or dyspnea on exertion  Gastrointestinal ROS: negative for - appetite loss, change in bowel habits or nausea/vomiting  Genito-Urinary ROS: negative except for as noted in HPI    PHYSICAL EXAMINATION  Visit Vitals  /72   Ht 5' 7\" (1.702 m)   Wt 155 lb 4 oz (70.4 kg)   Breastfeeding? Yes   BMI 24.32 kg/m²       Constitutional  · Appearance: well-nourished, well developed, alert, in no acute distress    HENT  · Head and Face: appears normal    Neck  · Inspection/Palpation: normal appearance, no masses or tenderness  · Lymph Nodes: no lymphadenopathy present  · Thyroid: gland size normal, nontender, no nodules or masses present on palpation    Chest  clear to auscultation, no wheezes, rales or rhonchi, symmetric air entry. Cardiac/CVS  normal rate, regular rhythm, normal S1, S2, no murmurs, rubs, clicks or gallops.     Breasts  · Inspection of Breasts: breasts symmetrical, no skin changes, no discharge present, nipple appearance normal, no skin retraction present  · Palpation of Breasts and Axillae: no masses present on palpation, no breast tenderness  · Axillary Lymph Nodes: no lymphadenopathy present    Gastrointestinal  · Abdominal Examination: abdomen non-tender to palpation, normal bowel sounds, no masses present  · Liver and spleen: no hepatomegaly present, spleen not palpable  · Hernias: no hernias identified    Genitourinary  · External Genitalia: normal appearance for age, no discharge present, no tenderness present, no inflammatory lesions present, no masses present, no atrophy present  · Vagina: normal vaginal vault without central or paravaginal defects, no discharge present, no inflammatory lesions present, no masses present  · Bladder: non-tender to palpation  · Urethra: appears normal  · Cervix: normal   · Uterus: normal size, shape and consistency  · Adnexa: no adnexal tenderness present, no adnexal masses present  · Perineum: perineum within normal limits, no evidence of trauma, no rashes or skin lesions present  · Anus: anus within normal limits  · Inguinal Lymph Nodes: no lymphadenopathy present    Skin  · General Inspection: no rash, no lesions identified    Neurologic/Psychiatric  · Mental Status:  · Orientation: grossly oriented to person, place and time  · Mood and Affect: mood normal, affect appropriate    Assessment:  Normal postpartum check  Encounter Diagnosis   Name Primary?  Routine postpartum follow-up Yes       Plan:  RTO for AE or sooner prn  Discussed contraception options; r/b/a. Planned contraception: vasectomy planned. She should return to normal activity  We recommend healthy balanced diet, regular exercise  We discussed safer sex practices, condom use and risk factors for sexually transmitted diseases. Patient should notify MD if she cannot resume normal activity and exercise  Recommended continuing prenatal vitamins/folic acid    Recommend back up with micronor/minipill with new start and if not exclusively breast feeding. Reviewed the importance of taking it at the same time each day.   Rec back up since not exclusively BF

## 2019-03-12 ENCOUNTER — OFFICE VISIT (OUTPATIENT)
Dept: INTERNAL MEDICINE CLINIC | Age: 34
End: 2019-03-12

## 2019-03-12 VITALS
TEMPERATURE: 97.7 F | HEART RATE: 91 BPM | HEIGHT: 67 IN | WEIGHT: 145 LBS | OXYGEN SATURATION: 99 % | BODY MASS INDEX: 22.76 KG/M2 | SYSTOLIC BLOOD PRESSURE: 111 MMHG | RESPIRATION RATE: 18 BRPM | DIASTOLIC BLOOD PRESSURE: 65 MMHG

## 2019-03-12 DIAGNOSIS — J02.9 SORE THROAT: ICD-10-CM

## 2019-03-12 DIAGNOSIS — R68.89 FLU-LIKE SYMPTOMS: Primary | ICD-10-CM

## 2019-03-12 DIAGNOSIS — J01.00 SUBACUTE MAXILLARY SINUSITIS: ICD-10-CM

## 2019-03-12 LAB
FLUAV+FLUBV AG NOSE QL IA.RAPID: NEGATIVE POS/NEG
FLUAV+FLUBV AG NOSE QL IA.RAPID: NEGATIVE POS/NEG
S PYO AG THROAT QL: NEGATIVE
VALID INTERNAL CONTROL?: YES
VALID INTERNAL CONTROL?: YES

## 2019-03-12 RX ORDER — AZITHROMYCIN 250 MG/1
250 TABLET, FILM COATED ORAL SEE ADMIN INSTRUCTIONS
Qty: 6 TAB | Refills: 0 | Status: SHIPPED | OUTPATIENT
Start: 2019-03-12 | End: 2019-03-17

## 2019-03-12 NOTE — PATIENT INSTRUCTIONS
Sore Throat: Care Instructions  Your Care Instructions    Infection by bacteria or a virus causes most sore throats. Cigarette smoke, dry air, air pollution, allergies, and yelling can also cause a sore throat. Sore throats can be painful and annoying. Fortunately, most sore throats go away on their own. If you have a bacterial infection, your doctor may prescribe antibiotics. Follow-up care is a key part of your treatment and safety. Be sure to make and go to all appointments, and call your doctor if you are having problems. It's also a good idea to know your test results and keep a list of the medicines you take. How can you care for yourself at home? · If your doctor prescribed antibiotics, take them as directed. Do not stop taking them just because you feel better. You need to take the full course of antibiotics. · Gargle with warm salt water once an hour to help reduce swelling and relieve discomfort. Use 1 teaspoon of salt mixed in 1 cup of warm water. · Take an over-the-counter pain medicine, such as acetaminophen (Tylenol), ibuprofen (Advil, Motrin), or naproxen (Aleve). Read and follow all instructions on the label. · Be careful when taking over-the-counter cold or flu medicines and Tylenol at the same time. Many of these medicines have acetaminophen, which is Tylenol. Read the labels to make sure that you are not taking more than the recommended dose. Too much acetaminophen (Tylenol) can be harmful. · Drink plenty of fluids. Fluids may help soothe an irritated throat. Hot fluids, such as tea or soup, may help decrease throat pain. · Use over-the-counter throat lozenges to soothe pain. Regular cough drops or hard candy may also help. These should not be given to young children because of the risk of choking. · Do not smoke or allow others to smoke around you. If you need help quitting, talk to your doctor about stop-smoking programs and medicines.  These can increase your chances of quitting for good. · Use a vaporizer or humidifier to add moisture to your bedroom. Follow the directions for cleaning the machine. When should you call for help? Call your doctor now or seek immediate medical care if:    · You have new or worse trouble swallowing.     · Your sore throat gets much worse on one side.    Watch closely for changes in your health, and be sure to contact your doctor if you do not get better as expected. Where can you learn more? Go to http://remi-kannan.info/. Enter [phone] in the search box to learn more about \"Sore Throat: Care Instructions. \"  Current as of: March 27, 2018  Content Version: 11.9  © 5795-2941 [employer]. Care instructions adapted under license by [employer] (which disclaims liability or warranty for this information). If you have questions about a medical condition or this instruction, always ask your healthcare professional. [address] any warranty or liability for your use of this information.

## 2019-03-12 NOTE — PROGRESS NOTES
Chief Complaint   Patient presents with    Flu    Headache     Influenza  Patient reports she was doing well and overall very healthy except yesterday at lunchtime she became acutely sick. She notes that her symptoms came on very fast.  She has a 3year-old and a 11month-old at home. 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 She does not have to work until Saturday. She has been taking Advil and Tylenol with no relief. She has a severe sore throat and has to swallow deeply. She has a low-grade fever. Pt had flu shot    Sinusitis  Patient reports that she has developed some pressure over her left eye area. She has a history of getting sinus infections. She has not had any discharge yet as her symptoms have started but not become productive. She has pressure in her ears as well. Tylenol and ibuprofen have not been effective. She is using Mucinex and Afrin. Past Medical History:   Diagnosis Date    Acne     Anemia     Contusion of left knee 11/6/2013    Eczema 11/6/2013    GERD (gastroesophageal reflux disease) 1/27/2015    Migraine 11/6/2013    Right knee meniscal tear     resolved w physical therapy. BBall injury    Right wrist fracture 1997, 2000    x2    Scoliosis     mild    Screening for cervical cancer 2/2010, 3/8/2012    normal. Dr. Rabia Rasheed     History reviewed. No pertinent surgical history.   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[employer])     Employer: [employer]   Tobacco Use    Smoking status: Never Smoker    Smokeless tobacco: Never Used   Substance and Sexual Activity    Alcohol use: No    Drug use: No    Sexual activity: Yes     Partners: Male     Birth control/protection: None Family History   Problem Relation Age of Onset    Hypertension Mother     Hypertension Father     High Cholesterol Father     Asthma Sister     Cancer Maternal Grandmother         colon    Diabetes Paternal Grandfather         type 2    Heart Attack Paternal Grandfather         fatal     Current Outpatient Medications   Medication Sig Dispense Refill    norethindrone (MICRONOR) 0.35 mg tab Take 1 Tab by mouth daily. 1 Package 5    dicloxacillin (DYNAPEN) 250 mg capsule Take 2 Caps by mouth Before breakfast, lunch, dinner and at bedtime. 40 Cap 1    ibuprofen (MOTRIN) 600 mg tablet Take 1 Tab by mouth every six (6) hours as needed for Pain. Take with food. 30 Tab 0    HYDROcodone-acetaminophen (NORCO) 5-325 mg per tablet Take 1 Tab by mouth every four (4) hours as needed for Pain. Max Daily Amount: 6 Tabs. Indications: Pain 10 Tab 0    esomeprazole (NEXIUM) 20 mg capsule Take  by mouth daily.  PROCTOZONE-HC 2.5 % rectal cream APPLY RECTALLY Q 12 H  0    ferrous sulfate (SLOW RELEASE IRON PO) Take  by mouth.  butalbital-acetaminophen-caffeine (FIORICET, ESGIC) -40 mg per tablet Take 1 Tab by mouth every four (4) hours as needed for Pain. 30 Tab 0     Allergies   Allergen Reactions    Bactrim [Sulfamethoprim] Hives    Ceftin [Cefuroxime Axetil] Hives    Gardasil [H Papillomavirus Vac,Qval (Pf)] Other (comments)     syncope       Review of Systems - General ROS: positive for  - chills, fatigue, fever, malaise and sleep disturbance  negative for - night sweats  Cardiovascular ROS: no chest pain or dyspnea on exertion  Respiratory ROS: no cough, shortness of breath, or wheezing    Visit Vitals  /65 (BP 1 Location: Left arm, BP Patient Position: Sitting)   Pulse 91   Temp 97.7 °F (36.5 °C) (Oral)   Resp 18   Ht 5' 7\" (1.702 m)   Wt 145 lb (65.8 kg)   LMP 03/06/2019   SpO2 99%   Breastfeeding?  No   BMI 22.71 kg/m²     General Appearance:  Well developed, well nourished,alert and oriented x 3, and individual in no acute distress. Ears/Nose/Mouth/Throat:   Hearing grossly normal.         Neck: Supple, no lad, no bruits   Chest:   Lungs clear to auscultation bilaterally. Cardiovascular:  Regular rate and rhythm, S1, S2 normal, no murmur. Abdomen:   Soft, non-tender, bowel sounds are active. Extremities: No edema bilaterally. Skin: Warm and dry, no suspicious lesions                 Diagnoses and all orders for this visit:    1. Flu-like symptoms  -     AMB POC DANIELLE INFLUENZA A/B TEST  Patient is flu negative but may be false negative. She is a pharmacist.  I discussed flank Tamiflu and she defers for now. 2. Sore throat   Viral pharyngitis  She is strep negative  Will try one-to-one mix with Benadryl 12.5 mg and milk of mag and if no improvement will use Magic mouthwash  -     AMB POC RAPID STREP A  -     magic mouthwash solution; Magic mouth wash   Maalox  Lidocaine 2% viscous   Diphenhydramine oral solution     Rinse and spit out do not swallow    Pharmacy to mix equal portions of ingredients to a total volume as indicated in the dispense amount. Sinusitis  -     azithromycin (ZITHROMAX) 250 mg tablet; Take 1 Tab by mouth See Admin Instructions for 5 days.

## 2019-04-03 RX ORDER — FLUCONAZOLE 150 MG/1
150 TABLET ORAL
Qty: 2 TAB | Refills: 0 | Status: SHIPPED | OUTPATIENT
Start: 2019-04-03 | End: 2019-12-26

## 2019-10-22 ENCOUNTER — OFFICE VISIT (OUTPATIENT)
Dept: OBGYN CLINIC | Age: 34
End: 2019-10-22

## 2019-10-22 VITALS — SYSTOLIC BLOOD PRESSURE: 98 MMHG | BODY MASS INDEX: 22.4 KG/M2 | DIASTOLIC BLOOD PRESSURE: 60 MMHG | WEIGHT: 143 LBS

## 2019-10-22 DIAGNOSIS — Z01.419 ENCOUNTER FOR GYNECOLOGICAL EXAMINATION (GENERAL) (ROUTINE) WITHOUT ABNORMAL FINDINGS: Primary | ICD-10-CM

## 2019-10-22 RX ORDER — BISMUTH SUBSALICYLATE 262 MG
1 TABLET,CHEWABLE ORAL DAILY
COMMUNITY

## 2019-10-22 NOTE — PATIENT INSTRUCTIONS

## 2019-10-22 NOTE — PROGRESS NOTES
[facility]  [email or web address]  [phone]    Garrick Menendez MD, FACOG       Annual Gynecologic Exam:  WWE <40  Chief Complaint   Patient presents with    Well Woman         Gamaliel Ryan is a 36 y.o.  WHITE [place]  female who presents for an annual well woman exam.  Patient's last menstrual period was 10/06/2019 (exact date). .    With regard to the Gardisil vaccine, she received 2 of the 3 injections. (Fainted/rash)  She does not report additional concerns today. Menstrual status:  Her periods are moderate. She does report dysmenorrhea/painful menses. She does not report irregular bleeding. Sexual history and Contraception:  Social History     Substance and Sexual Activity   Sexual Activity Yes    Partners: Male    Birth control/protection: Surgical    Comment: Vasectomy     She never use condoms with sexual activity  She does not reports new sexual partner(s) in the last year. The patient does not request STD testing. We recommended testing per CDC guidelines and at patient request.     Preventive Medicine History:  Her most recent Pap smear result: normal was obtained in 2018  Her most recent HR HPV screen was Negative obtained in 2018  She does not have a history of ALAINA 2, 3 or cervical cancer. Past Medical History:   Diagnosis Date    Acne     Anemia     Contusion of left knee 2013    Eczema 2013    GERD (gastroesophageal reflux disease) 2015    Migraine 2013    Right knee meniscal tear     resolved w physical therapy.   BBall injury    Right wrist fracture , 2000    x2    Scoliosis     mild    Screening for cervical cancer 2010, 3/8/2012    normal. Dr. Sims Host     OB History    Para Term  AB Living   2 2 2 0 0 2   SAB TAB Ectopic Molar Multiple Live Births   0 0 0   0 2      # Outcome Date GA Lbr Faraz/2nd Weight Sex Delivery Anes PTL Lv   2 Term 10/01/18 40w1d 02:35 / 00:15 8 lb 2.5 oz (3.7 kg) F Vag-Spont EPIDURAL AN  [place]   1 Term 06/24/15 40w5d   F Vag-Spont   [place]      Obstetric Comments   PPH      History reviewed. No pertinent surgical history.   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[address])     Employer: [employer]   Social Needs    Financial resource strain: Not on file    Food insecurity:     Worry: Not on file     Inability: Not on file    Transportation needs:     Medical: Not on file     Non-medical: Not on file   Tobacco Use    Smoking status: Never Smoker    Smokeless tobacco: Never Used   Substance and Sexual Activity    Alcohol use: No    Drug use: No    Sexual activity: Yes     Partners: Male     Birth control/protection: Surgical     Comment: Vasectomy   Lifestyle    Physical activity:     Days per week: Not on file     Minutes per session: Not on file    Stress: Not on file   Relationships    Social connections:     Talks on phone: Not on file     Gets together: Not on file     Attends Advent service: Not on file     Active member of club or organization: Not on file     Attends meetings of clubs or organizations: Not on file     Relationship status: Not on file    Intimate partner violence:     Fear of current or ex partner: Not on file     Emotionally abused: Not on file     Physically abused: Not on file     Forced sexual activity: Not on file   Other Topics Concern    Not on file   Social History Narrative    Not on file Allergies   Allergen Reactions    Bactrim [Sulfamethoprim] Hives    Ceftin [Cefuroxime Axetil] Hives    Gardasil [H Papillomavirus Vac,Qval (Pf)] Other (comments)     syncope       Current Outpatient Medications   Medication Sig    multivitamin (ONE A DAY) tablet Take 1 Tab by mouth daily.  butalbital-acetaminophen-caffeine (FIORICET, ESGIC) -40 mg per tablet Take 1 Tab by mouth every four (4) hours as needed for Pain. No current facility-administered medications for this visit. Patient Active Problem List   Diagnosis Code    Acne L70.9    Migraine G43.909    Eczema L30.9    Contusion of left knee S80. 02XA    GERD (gastroesophageal reflux disease) K21.9    Normal labor and delivery O80    Scoliosis M41.9    Encounter for supervision of other normal pregnancy, unspecified trimester Z34.80    Low lying placenta nos or without hemorrhage, third trimester O44.43       Review of Systems - History obtained from the patient  Constitutional: negative for weight loss, fever, night sweats  HEENT: negative for hearing loss, earache, congestion, snoring, sorethroat  CV: negative for chest pain, palpitations, edema  Resp: negative for cough, shortness of breath, wheezing  GI: negative for change in bowel habits, abdominal pain, black or bloody stools  : negative for frequency, dysuria, hematuria  GYN: see HPI  MSK: negative for back pain, joint pain, muscle pain  Breast: negative for breast lumps, nipple discharge, galactorrhea  Skin :negative for itching, rash, hives  Neuro: negative for dizziness, headache, confusion, weakness  Psych: negative for anxiety, depression, change in mood  Heme/lymph: negative for bleeding, bruising, pallor      (WWE continued)     Physical Exam  Visit Vitals  BP 98/60   Wt 143 lb (64.9 kg)   LMP 10/06/2019 (Exact Date)   Breastfeeding?  No   BMI 22.40 kg/m²       Constitutional  · Appearance: well-nourished, well developed, alert, in no acute distress    HENT  · Head and Face: appears normal    Neck  · Inspection/Palpation: normal appearance, no masses or tenderness  · Lymph Nodes: no lymphadenopathy present  · Thyroid: gland size normal, nontender, no nodules or masses present on palpation    Chest  · Respiratory Effort: breathing unlabored  · Auscultation: normal breath sounds    Cardiovascular  · Heart:  · Auscultation: regular rate and rhythm without murmur    Breasts  · Inspection of Breasts: breasts symmetrical, no skin changes, no discharge present, nipple appearance normal, no skin retraction present  · Palpation of Breasts and Axillae: no masses present on palpation, no breast tenderness  · Axillary Lymph Nodes: no lymphadenopathy present    Gastrointestinal  · Abdominal Examination: abdomen non-tender to palpation, normal bowel sounds, no masses present  · Liver and spleen: no hepatomegaly present, spleen not palpable  · Hernias: no hernias identified    Genitourinary  · External Genitalia: normal appearance for age, no discharge present, no tenderness present, no inflammatory lesions present, no masses present  · Vagina: normal vaginal vault without central or paravaginal defects, no discharge present, no inflammatory lesions present, no masses present  · Bladder: non-tender to palpation  · Urethra: appears normal  · Cervix: normal   · Uterus: normal size, shape and consistency  · Adnexa: no adnexal tenderness present, no adnexal masses present  · Perineum: perineum within normal limits, no evidence of trauma, no rashes or skin lesions present  · Anus: anus within normal limits, no hemorrhoids present  · Inguinal Lymph Nodes: no lymphadenopathy present    Skin  · General Inspection: no rash, no lesions identified    Neurologic/Psychiatric  · Mental Status:  · Orientation: grossly oriented to person, place and time  · Mood and Affect: mood normal, affect appropriate    Assessment:  36 y.o.  for well woman exam  Encounter Diagnosis Name Primary?  Encounter for gynecological examination (general) (routine) without abnormal findings Yes       Plan:  The patient was counseled about diet, exercise, healthy lifestyle  We discussed self breast exam  We discussed safer sex practices, condom use and risk factors for sexually transmitted diseases. We discussed current pap smear and HR HPV testing guidelines. We recommend follow up one year for routine annual gynecologic exam or sooner prn  We recommend routine follow up with her primary care doctor for management of chronic medical problems and non-gynecologic concerns  Handouts were given to the patient  We discussed calcium/vitamin D/weight bearing exercise and osteoporosis prevention  Disc hpv vaccine does not want to complete  Track cycles for migraine risk factors, if desired    Folllow up:  [x] return for annual well woman exam in one year or sooner if she is having problems  [] follow up and ultrasound  [] 6 months  [] 3 months  [] 6 weeks   [] 1 month    No orders of the defined types were placed in this encounter. No results found for any visits on 10/22/19.

## 2019-11-15 ENCOUNTER — HOSPITAL ENCOUNTER (OUTPATIENT)
Dept: LAB | Age: 34
Discharge: HOME | End: 2019-11-15

## 2019-11-15 ENCOUNTER — OFFICE VISIT (OUTPATIENT)
Dept: INTERNAL MEDICINE CLINIC | Age: 34
End: 2019-11-15

## 2019-11-15 VITALS
SYSTOLIC BLOOD PRESSURE: 106 MMHG | OXYGEN SATURATION: 98 % | WEIGHT: 141 LBS | HEART RATE: 67 BPM | BODY MASS INDEX: 22.13 KG/M2 | TEMPERATURE: 98.3 F | RESPIRATION RATE: 18 BRPM | HEIGHT: 67 IN | DIASTOLIC BLOOD PRESSURE: 69 MMHG

## 2019-11-15 DIAGNOSIS — J20.9 ACUTE BRONCHITIS, UNSPECIFIED ORGANISM: ICD-10-CM

## 2019-11-15 DIAGNOSIS — Z00.00 PREVENTATIVE HEALTH CARE: ICD-10-CM

## 2019-11-15 DIAGNOSIS — Z00.00 PREVENTATIVE HEALTH CARE: Primary | ICD-10-CM

## 2019-11-15 DIAGNOSIS — J01.00 SUBACUTE MAXILLARY SINUSITIS: ICD-10-CM

## 2019-11-15 DIAGNOSIS — D22.9 ATYPICAL NEVI: ICD-10-CM

## 2019-11-15 LAB
ALBUMIN SERPL-MCNC: 4.2 G/DL (ref 3.5–5)
ALBUMIN/GLOB SERPL: 1.2 {RATIO} (ref 1.1–2.2)
ALP SERPL-CCNC: 74 U/L (ref 45–117)
ALT SERPL-CCNC: 24 U/L (ref 12–78)
ANION GAP SERPL CALC-SCNC: 4 MMOL/L (ref 5–15)
AST SERPL-CCNC: 18 U/L (ref 15–37)
BILIRUB SERPL-MCNC: 0.4 MG/DL (ref 0.2–1)
BUN SERPL-MCNC: 16 MG/DL (ref 6–20)
BUN/CREAT SERPL: 21 (ref 12–20)
CALCIUM SERPL-MCNC: 9.7 MG/DL (ref 8.5–10.1)
CHLORIDE SERPL-SCNC: 105 MMOL/L (ref 97–108)
CHOLEST SERPL-MCNC: 183 MG/DL
CO2 SERPL-SCNC: 29 MMOL/L (ref 21–32)
CREAT SERPL-MCNC: 0.76 MG/DL (ref 0.55–1.02)
ERYTHROCYTE [DISTWIDTH] IN BLOOD BY AUTOMATED COUNT: 13.2 % (ref 11.5–14.5)
GLOBULIN SER CALC-MCNC: 3.6 G/DL (ref 2–4)
GLUCOSE SERPL-MCNC: 87 MG/DL (ref 65–100)
HCT VFR BLD AUTO: 43.1 % (ref 35–47)
HDLC SERPL-MCNC: 63 MG/DL
HDLC SERPL: 2.9 {RATIO} (ref 0–5)
HGB BLD-MCNC: 13.5 G/DL (ref 11.5–16)
LDLC SERPL CALC-MCNC: 110.4 MG/DL (ref 0–100)
LIPID PROFILE,FLP: ABNORMAL
MCH RBC QN AUTO: 28.9 PG (ref 26–34)
MCHC RBC AUTO-ENTMCNC: 31.3 G/DL (ref 30–36.5)
MCV RBC AUTO: 92.3 FL (ref 80–99)
NRBC # BLD: 0 K/UL (ref 0–0.01)
NRBC BLD-RTO: 0 PER 100 WBC
PLATELET # BLD AUTO: 181 K/UL (ref 150–400)
PMV BLD AUTO: 10.8 FL (ref 8.9–12.9)
POTASSIUM SERPL-SCNC: 4.4 MMOL/L (ref 3.5–5.1)
PROT SERPL-MCNC: 7.8 G/DL (ref 6.4–8.2)
RBC # BLD AUTO: 4.67 M/UL (ref 3.8–5.2)
SODIUM SERPL-SCNC: 138 MMOL/L (ref 136–145)
TRIGL SERPL-MCNC: 48 MG/DL (ref ?–150)
VLDLC SERPL CALC-MCNC: 9.6 MG/DL
WBC # BLD AUTO: 7.2 K/UL (ref 3.6–11)

## 2019-11-15 RX ORDER — CODEINE PHOSPHATE AND GUAIFENESIN 10; 100 MG/5ML; MG/5ML
5 SOLUTION ORAL
Qty: 60 ML | Status: SHIPPED | OUTPATIENT
Start: 2019-11-15 | End: 2019-11-22

## 2019-11-15 RX ORDER — DOXYCYCLINE 100 MG/1
100 CAPSULE ORAL 2 TIMES DAILY
Qty: 14 CAP | Refills: 0 | Status: SHIPPED | OUTPATIENT
Start: 2019-11-15 | End: 2020-01-13

## 2019-11-15 NOTE — PROGRESS NOTES
Yajaira Plaza is a 36 y.o. female  Presenting for her annual checkup and follow-up    Saw Dr Gurinder Ugarte in GYN 10/22/19  Hx of hemorrhage at pregnancy. No other bleeding. Would like skin exam.      Presents with nasal blockage, post nasal drip and bilateral sinus pain for 7 days. Denies shortness of breath, chest pain, abdominal pain, nausea, vomiting,  fever and chills. Symptoms are moderate. Recent treatment for similar symptoms? None. Has tried over-the-counter remedies with mild and paritial relief of symptoms. Contacts with similar infections: no.  Asthma?:  no.   reports that she has never smoked. She has never used smokeless tobacco.    Last DEXA:   Health Maintenance   Topic Date Due    PAP AKA CERVICAL CYTOLOGY  02/21/2021    DTaP/Tdap/Td series (4 - Td) 07/25/2028    Influenza Age 5 to Adult  Completed    Pneumococcal 0-64 years  Aged Out     Exercise: moderately active  Diet: generally follows a low fat low cholesterol diet    Vaccinations reviewed  Immunization History   Administered Date(s) Administered    Hep A Vaccine (Adult) 01/30/2014    Human Papillomavirus 01/01/2006    Influenza Vaccine 08/24/2013, 09/16/2014, 08/19/2017, 10/23/2019    Influenza Vaccine (Quad) 09/02/2018    Influenza Vaccine (Quad) PF 09/02/2018, 10/23/2019    TDAP Vaccine 09/07/2012    Tdap 04/16/2015, 07/25/2018       Allergies: Bactrim [sulfamethoprim] and Gardasil [h papillomavirus vac,qval (pf)]  Current Outpatient Medications   Medication Sig    guaiFENesin-codeine (ROBITUSSIN AC) 100-10 mg/5 mL solution Take 5 mL by mouth three (3) times daily as needed for Cough for up to 7 days. Max Daily Amount: 15 mL.  doxycycline (MONODOX) 100 mg capsule Take 1 Cap by mouth two (2) times a day.  multivitamin (ONE A DAY) tablet Take 1 Tab by mouth daily.  butalbital-acetaminophen-caffeine (FIORICET, ESGIC) -40 mg per tablet Take 1 Tab by mouth every four (4) hours as needed for Pain.  (Patient taking differently: Take 1 Tab by mouth as needed for Pain.)     No current facility-administered medications for this visit. has a past medical history of Acne, Anemia, Contusion of left knee (11/6/2013), Eczema (11/6/2013), Encounter for supervision of other normal pregnancy, unspecified trimester (3/5/2018), GERD (gastroesophageal reflux disease) (1/27/2015), Migraine (11/6/2013), Right knee meniscal tear, Right wrist fracture (1997, 2000), Scoliosis, and Screening for cervical cancer (2/2010, 3/8/2012). She also has no past medical history of Abnormal Papanicolaou smear of cervix, Asthma, Breast disorder, Chlamydia, Complication of anesthesia, Diabetes ([address] Disease of blood and blood forming organ, Epilepsy ([address] Essential hypertension, Genital herpes, Gestational diabetes, Gestational hypertension, Gonorrhea, Heart abnormality, Herpes gestationis, Herpes simplex virus (HSV) infection, Human immunodeficiency virus (HIV) disease ([address] Infertility, female, Kidney disease, Liver disease, Nicotine vapor product user, Non-nicotine vapor product user, Phlebitis and thrombophlebitis, Pituitary disorder ([address] Polycystic disease, ovaries, Postpartum depression, Psychiatric problem, Rhesus isoimmunization affecting pregnancy, Sickle cell disease ([address] Sickle cell trait syndrome ([address] Syphilis, Systemic lupus erythematosus ([address] Thyroid activity decreased, or Trauma. History reviewed. No pertinent surgical history.    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   Social Needs    Financial resource strain: Not on file    Food insecurity:     Worry: Not on file     Inability: Not on file    Transportation needs:     Medical: Not on file     Non-medical: Not on file   Tobacco Use    Smoking status: Never Smoker    Smokeless tobacco: Never Used   Substance and Sexual Activity    Alcohol use: No    Drug use: No    Sexual activity: Yes     Partners: Male     Birth control/protection: Surgical     Comment: Vasectomy   Lifestyle    Physical activity:     Days per week: Not on file     Minutes per session: Not on file    Stress: Not on file   Relationships    Social connections:     Talks on phone: Not on file     Gets together: Not on file     Attends Holiness service: Not on file     Active member of club or organization: Not on file     Attends meetings of clubs or organizations: Not on file     Relationship status: Not on file    Intimate partner violence:     Fear of current or ex partner: Not on file     Emotionally abused: Not on file     Physically abused: Not on file     Forced sexual activity: Not on file   Other Topics Concern    Not on file   Social History Narrative    Not on file     Family History   Problem Relation Age of Onset    Hypertension Mother     Hypertension Father     High Cholesterol Father     Asthma Sister     Cancer Maternal Grandmother         colon    Diabetes Paternal Grandfather         type 2    Heart Attack Paternal Grandfather         fatal       Review of Systems - History obtained from the patient  General ROS: negative for - night sweats, weight gain or weight loss  Cardiovascular ROS: no chest pain, dyspnea on exertion, edema  GYN ROS: normal menses, no abnormal bleeding, pelvic pain or discharge, no breast pain or new or enlarging lumps on self exam.    Physical exam  Blood pressure 106/69, pulse 67, temperature 98.3 °F (36.8 °C), temperature source Oral, resp. rate 18, height 5' 7\" (1.702 m), weight 141 lb (64 kg), SpO2 98 %, not currently breastfeeding.   Wt Readings from Last 3 Encounters:   11/15/19 141 lb (64 kg)   10/22/19 143 lb (64.9 kg)   03/12/19 145 lb (65.8 kg)     she appears well, alert and oriented x 3, pleasant and cooperative. Vitals as noted. No rashes or significant lesions. Neck supple and free of adenopathy, or masses. No thyromegaly or carotid bruits. Cranial nerves normal. Lungs are clear to auscultation. Heart sounds are normal with no murmurs, clicks, gallops or rubs. Abdomen is soft, non- tender, with no masses or organomegaly. Extremities, peripheral pulses and reflexes are normal.  .   Skin right scapular 3 mm brown scaly nevus  BREAST EXAM: patient declines to have breast exam  PELVIC EXAM: examination not indicated      Diagnoses and all orders for this visit:    1. Preventative health care  -     LIPID PANEL; Future  -     METABOLIC PANEL, COMPREHENSIVE; Future  -     CBC W/O DIFF; Future    2. Subacute maxillary sinusitis  3. Acute bronchitis, unspecified organism  Try mucinex 1200mg twice daily, tylenol or advil as directed on bottle. -     guaiFENesin-codeine (ROBITUSSIN AC) 100-10 mg/5 mL solution; Take 5 mL by mouth three (3) times daily as needed for Cough for up to 7 days. Max Daily Amount: 15 mL. -     doxycycline (MONODOX) 100 mg capsule; Take 1 Cap by mouth two (2) times a day.     4. Atypical nevi- R scapula  -     REFERRAL TO DERMATOLOGY      The patient is asked to make an attempt to improve diet and exercise patterns    Return for yearly wellness visits

## 2019-12-26 RX ORDER — FLUCONAZOLE 150 MG/1
TABLET ORAL
Qty: 2 TAB | Refills: 0 | Status: SHIPPED | OUTPATIENT
Start: 2019-12-26 | End: 2020-01-13

## 2020-01-10 NOTE — PATIENT INSTRUCTIONS
Premenstrual Syndrome (PMS): Care Instructions  Your Care Instructions    Premenstrual syndrome (PMS) is the name for some uncomfortable changes that can happen to women at a certain time of the month. This is the time between when your body releases an egg (ovulation) and the first days of your period. Doctors don't know why some women have PMS and others don't. They also don't know why some women have worse symptoms than others. There are different symptoms of PMS. You may have bloating or muscle aches. You may also feel castillo or have trouble sleeping. Some women crave certain foods. Any of these symptoms can get in the way of how well you feel. They may also affect your work or your relationships. Home treatments and medicines can help you feel better. Follow-up care is a key part of your treatment and safety. Be sure to make and go to all appointments, and call your doctor if you are having problems. It's also a good idea to know your test results and keep a list of the medicines you take. How can you care for yourself at home? · Take anti-inflammatory medicines if your body aches or your breasts are sore. These include ibuprofen (Advil, Motrin) and naproxen (Aleve). Read and follow all instructions on the label. · Take your medicines exactly as prescribed. Call your doctor if you think you are having a problem with your medicine. · Some foods and drinks can make symptoms worse. Try to avoid or drink less caffeine or alcohol during the time you have PMS or several days before you expect to have symptoms. You may also want to avoid or eat less chocolate and salt then too. · Eat a healthy, balanced diet. Include plenty of water, fruits, vegetables, and whole grains. · Get at least 30 minutes of physical activity on most days of the week. Walking is a good choice. You also may want to do other activities, such as running, swimming, cycling, or playing tennis or team sports.   · Talk to your doctor about taking calcium and magnesium supplements. These may help relieve PMS symptoms. When should you call for help? Call your doctor now or seek immediate medical care if:    · You have severe vaginal bleeding.     · You have new or worse belly or pelvic pain.    Watch closely for changes in your health, and be sure to contact your doctor if:    · You have unusual vaginal bleeding.     · You do not get better as expected. Where can you learn more? Go to http://remi-kannan.info/. Enter Y188 in the search box to learn more about \"Premenstrual Syndrome (PMS): Care Instructions. \"  Current as of: February 19, 2019  Content Version: 12.2  © 8930-7329 [employer]. Care instructions adapted under license by [employer] (which disclaims liability or warranty for this information). If you have questions about a medical condition or this instruction, always ask your healthcare professional. [address] any warranty or liability for your use of this information.

## 2020-01-13 ENCOUNTER — OFFICE VISIT (OUTPATIENT)
Dept: OBGYN CLINIC | Age: 35
End: 2020-01-13

## 2020-01-13 VITALS
BODY MASS INDEX: 22.29 KG/M2 | DIASTOLIC BLOOD PRESSURE: 66 MMHG | SYSTOLIC BLOOD PRESSURE: 110 MMHG | HEIGHT: 67 IN | WEIGHT: 142 LBS

## 2020-01-13 DIAGNOSIS — L70.9 ACNE, UNSPECIFIED ACNE TYPE: ICD-10-CM

## 2020-01-13 DIAGNOSIS — N92.4 EXCESSIVE BLEEDING IN PREMENOPAUSAL PERIOD: Primary | ICD-10-CM

## 2020-01-13 DIAGNOSIS — R45.86 MOOD CHANGE: ICD-10-CM

## 2020-01-13 DIAGNOSIS — N94.6 DYSMENORRHEA: ICD-10-CM

## 2020-01-13 RX ORDER — NORETHINDRONE ACETATE AND ETHINYL ESTRADIOL, ETHINYL ESTRADIOL AND FERROUS FUMARATE 1MG-10(24)
1 KIT ORAL DAILY
Qty: 3 PACKAGE | Refills: 3 | Status: SHIPPED | OUTPATIENT
Start: 2020-01-13 | End: 2021-03-17

## 2020-01-13 NOTE — PROGRESS NOTES
[facility]  [email or web address]  [phone]    Yousif Santillan MD, FACOG       OB/GYN Problem visit    Chief Complaint:   Chief Complaint   Patient presents with    Hormone Problem    Menstrual Problem       History of Present Illness: This is a new problem being evaluated by this provider. The patient is a 36 y.o.  female who reports having mood changes around ovulation, acne breakouts and heavy first 2 days of cycles since having last baby. Her cycles are regular. Would like to discuss hormonal method to help. She reports the symptoms are has worsened. Aggravating factors include none. Alleviating factors include none. She does not have other concerns. LMP: Patient's last menstrual period was 2020 (exact date). [facility]:  Past Medical History:   Diagnosis Date    Acne     Anemia     Contusion of left knee 2013    Eczema 2013    Encounter for supervision of other normal pregnancy, unspecified trimester 3/5/2018    Declined genetic testing GERD zantac Nausea [facility]an [employer], post, no longer previa <2cm, fu 34 wks (rafael cole) 18 1 hr gtt #136; 3 hr gtt 18 WNL-80, 131, 124, 78 Hgb 10.5 @ 29wks neg k-b 18 MFM appt 18 @ 8:45am for elevated BP/anxiety--pt notified in office. Induction 10/3/18. Pt notified.  GERD (gastroesophageal reflux disease) 2015    Migraine 2013    no aura, worse on cycle,     Right knee meniscal tear     resolved w physical therapy. BBall injury    Right wrist fracture , 2000    x2    Scoliosis     mild    Screening for cervical cancer 2010, 3/8/2012    normal. Dr. Bao Wright     History reviewed. No pertinent surgical history.   Family History   Problem Relation Age of Onset    Hypertension Mother     Hypertension Father     High Cholesterol Father     Asthma Sister     Cancer Maternal Grandmother         colon    Diabetes Paternal Grandfather         type 2    Heart Attack Paternal Grandfather         fatal     Social History     Tobacco Use    Smoking status: Never Smoker    Smokeless tobacco: Never Used   Substance Use Topics    Alcohol use: No    Drug use: No     Allergies   Allergen Reactions    Bactrim [Sulfamethoprim] Hives    Gardasil [H Papillomavirus Vac,Qval (Pf)] Other (comments)     syncope     Current Outpatient Medications   Medication Sig    OTHER Collagen gummies PO    LO LOESTRIN FE 1 mg-10 mcg (24)/10 mcg (2) tab Take 1 Tab by mouth daily.  multivitamin (ONE A DAY) tablet Take 1 Tab by mouth daily. No current facility-administered medications for this visit. Review of Systems:  History obtained from the patient  Constitutional: see hpi  ENT ROS: negative for - hearing change, oral lesions or visual changes  Respiratory: negative for cough, wheezing or dyspnea on exertion  Cardiovascular: negative for chest pain, irregular heart beats, exertional chest pressure/discomfort  Gastrointestinal: negative for dysphagia, nausea and vomiting  Genito-Urinary ROS:  see HPI  Inteument/breast: negative for rash, breast lump and nipple discharge  Musculoskeletal:negative for stiff joints, neck pain and muscle weakness  Endocrine ROS: see hpi  Hematological and Lymphatic ROS: negative for - blood clots, bruising or swollen lymph nodes    Physical Exam:  Visit Vitals  /66   Ht 5' 7\" (1.702 m)   Wt 142 lb (64.4 kg)   Breastfeeding No   BMI 22.24 kg/m²       GENERAL: alert, well appearing, and in no distress  HEAD: normocephalic, atraumatic. NEURO: alert, oriented, normal speech    Assessment:  Encounter Diagnoses   Name Primary?  Excessive bleeding in premenopausal period Yes    Dysmenorrhea     Acne, unspecified acne type     Mood change        Plan:  The patient is advised that she should contact the office if she does not note improvement or if symptoms recur  Recommend follow up with PCP for non-gynecologic complaints and chronic medical problems. She should contact our office with any questions or concerns  She could keep her routine annual exam appointment. We discussed progesterone only and non hormonal options for contraception including but not limited to condoms, IUDs, Nexplanon, and depo provera. Discussed risks, benefits and alternatives of OCP/nuvaring/patch: including but not limited to dvt/pe/mi/cva/ca/gi risks and that smoking, increasing age and other health conditions can increase these risks. Disc risk of ocp and migraine max  Fu Wwe or sooner if ni  ocp ho, rec start with next cycle near beginning. .  Orders Placed This Encounter    LO LOESTRIN FE 1 mg-10 mcg (24)/10 mcg (2) tab       No results found for this visit on 01/13/20.

## 2020-02-11 ENCOUNTER — OFFICE VISIT (OUTPATIENT)
Dept: INTERNAL MEDICINE CLINIC | Age: 35
End: 2020-02-11

## 2020-02-11 VITALS
BODY MASS INDEX: 22.57 KG/M2 | TEMPERATURE: 98.9 F | DIASTOLIC BLOOD PRESSURE: 70 MMHG | WEIGHT: 143.8 LBS | OXYGEN SATURATION: 100 % | HEIGHT: 67 IN | SYSTOLIC BLOOD PRESSURE: 114 MMHG | RESPIRATION RATE: 12 BRPM | HEART RATE: 68 BPM

## 2020-02-11 DIAGNOSIS — J98.01 BRONCHOSPASM: ICD-10-CM

## 2020-02-11 DIAGNOSIS — R68.89 FLU-LIKE SYMPTOMS: Primary | ICD-10-CM

## 2020-02-11 DIAGNOSIS — J06.9 VIRAL UPPER RESPIRATORY TRACT INFECTION: ICD-10-CM

## 2020-02-11 DIAGNOSIS — R59.1 LYMPHADENOPATHY: ICD-10-CM

## 2020-02-11 RX ORDER — ALBUTEROL SULFATE 90 UG/1
2 AEROSOL, METERED RESPIRATORY (INHALATION)
Qty: 1 INHALER | Refills: 2 | Status: SHIPPED | OUTPATIENT
Start: 2020-02-11

## 2020-02-11 RX ORDER — BENZONATATE 200 MG/1
200 CAPSULE ORAL
Qty: 30 CAP | Refills: 0 | Status: SHIPPED | OUTPATIENT
Start: 2020-02-11 | End: 2020-02-21

## 2020-02-11 NOTE — PATIENT INSTRUCTIONS

## 2020-02-11 NOTE — LETTER
NOTIFICATION RETURN TO WORK / SCHOOL 
 
2/11/2020 1:42 PM 
 
Ms. Suyapa Paige [address] [facility] [id number] To Whom It May Concern: 
 
Suyapa Paige is currently under the care of [address] [place] [address],[address]. She was seen in office today for medical illness and was advised to remain out of work until 2/13/2020. She will return to work/school on: 2/13/2020 If there are questions or concerns please have the patient contact our office.  
 
 
 
Sincerely, 
 
 
See Florian NP

## 2020-02-11 NOTE — PROGRESS NOTES
HISTORY OF PRESENT ILLNESS  Rickford Krabbe is a 36 y.o. female. HPI  Presents with complaints of cough that has been progressively worsening over the past 3 days. Is now having bodyaches, chills, fever to 100.5 for the past 2 days. Has been taking OTC Motrin to control fever and Mucinex for cough without relief. Cough has been minimally productive of non-purulent sputum. Denies head congestion, sore throat, sinus pain. Has noted some tightness in upper chest but has not heard any wheezing; has been using her Albuterol inhaler twice daily for the past 2 days. Has history of exercise induced bronchospasm. Has taken Tessalon in past for cough with good effects. Her  and 3year old daughter have been ill with similar symptoms. Her daughter tested negative for influenza and strep on 2/6/2020. Review of Systems   Constitutional: Positive for chills, fever and malaise/fatigue. HENT: Negative for congestion, ear pain, sinus pain and sore throat. Respiratory: Positive for cough, sputum production and shortness of breath. Negative for wheezing. Cardiovascular: Negative for chest pain. Gastrointestinal: Negative for nausea and vomiting. Musculoskeletal: Positive for myalgias. Neurological: Negative for dizziness, tingling and headaches. /70 (BP 1 Location: Left arm, BP Patient Position: Sitting)   Pulse 68   Temp 98.9 °F (37.2 °C) (Oral)   Resp 12   Ht 5' 7\" (1.702 m)   Wt 143 lb 12.8 oz (65.2 kg)   LMP 01/31/2020 (Approximate)   SpO2 100%   Breastfeeding No   BMI 22.52 kg/m²   Physical Exam  Vitals signs and nursing note reviewed. Constitutional:       General: She is not in acute distress. Appearance: Normal appearance. HENT:      Head: Normocephalic and atraumatic.       Right Ear: Tympanic membrane, ear canal and external ear normal.      Left Ear: Tympanic membrane, ear canal and external ear normal.      Nose: Nose normal.      Mouth/Throat:      Mouth: Mucous membranes are moist.      Pharynx: Oropharynx is clear. Posterior oropharyngeal erythema present. Neck:      Musculoskeletal: Normal range of motion. Cardiovascular:      Rate and Rhythm: Normal rate and regular rhythm. Pulmonary:      Effort: Pulmonary effort is normal.      Breath sounds: No wheezing or rhonchi. Comments: Upper chest congestion  Lymphadenopathy:      Cervical: Cervical adenopathy (left tonsillar adenopathy) present. Skin:     General: Skin is warm and dry. Neurological:      Mental Status: She is alert and oriented to person, place, and time. Psychiatric:         Mood and Affect: Mood normal.         Behavior: Behavior normal.         ASSESSMENT and PLAN  Diagnoses and all orders for this visit:    1. Flu-like symptoms  -     AMB POC DANIELLE INFLUENZA A/B TEST -- negative    2. Viral upper respiratory tract infection -- appears viral at this point; antibiotics not indicated. Treat symptomatically and advised to alert office if symptoms worsen or fail to improve over the next 72 hours. -     benzonatate (TESSALON) 200 mg capsule; Take 1 Cap by mouth three (3) times daily as needed for Cough for up to 10 days. 3. Bronchospasm  -     albuterol (PROVENTIL HFA, VENTOLIN HFA, PROAIR HFA) 90 mcg/actuation inhaler; Take 2 Puffs by inhalation every four (4) hours as needed for Wheezing. 4. Lymphadenopathy -- continue to observe; most likely reactive.   -     AMB POC RAPID STREP A -- negative      lab results and schedule of future lab studies reviewed with patient  reviewed diet, exercise and weight control  reviewed medications and side effects in detail

## 2020-05-28 RX ORDER — ADAPALENE AND BENZOYL PEROXIDE .1; 2.5 G/100G; G/100G
1 GEL TOPICAL DAILY
Qty: 45 G | Refills: 5 | Status: SHIPPED | OUTPATIENT
Start: 2020-05-28 | End: 2022-02-25 | Stop reason: ALTCHOICE

## 2021-03-10 ENCOUNTER — APPOINTMENT (OUTPATIENT)
Age: 36
Setting detail: DERMATOLOGY
End: 2021-03-10

## 2021-03-10 DIAGNOSIS — L70.0 ACNE VULGARIS: ICD-10-CM

## 2021-03-10 DIAGNOSIS — Z71.89 OTHER SPECIFIED COUNSELING: ICD-10-CM

## 2021-03-10 DIAGNOSIS — D18.0 HEMANGIOMA: ICD-10-CM

## 2021-03-10 PROBLEM — D18.01 HEMANGIOMA OF SKIN AND SUBCUTANEOUS TISSUE: Status: ACTIVE | Noted: 2021-03-10

## 2021-03-10 PROCEDURE — OTHER TREATMENT REGIMEN: OTHER

## 2021-03-10 PROCEDURE — 99203 OFFICE O/P NEW LOW 30 MIN: CPT

## 2021-03-10 PROCEDURE — OTHER COUNSELING: OTHER

## 2021-03-10 PROCEDURE — OTHER MIPS QUALITY: OTHER

## 2021-03-10 PROCEDURE — OTHER SUNSCREEN RECOMMENDATIONS: OTHER

## 2021-03-10 PROCEDURE — OTHER REASSURANCE: OTHER

## 2021-03-10 ASSESSMENT — LOCATION SIMPLE DESCRIPTION DERM
LOCATION SIMPLE: LEFT FOREHEAD
LOCATION SIMPLE: RIGHT UPPER BACK
LOCATION SIMPLE: LEFT LOWER BACK
LOCATION SIMPLE: RIGHT CHEEK
LOCATION SIMPLE: LEFT CHEEK
LOCATION SIMPLE: CHEST
LOCATION SIMPLE: RIGHT FOREHEAD

## 2021-03-10 ASSESSMENT — LOCATION DETAILED DESCRIPTION DERM
LOCATION DETAILED: LEFT FOREHEAD
LOCATION DETAILED: LEFT SUPERIOR LATERAL LOWER BACK
LOCATION DETAILED: RIGHT SUPERIOR MEDIAL UPPER BACK
LOCATION DETAILED: RIGHT SUPERIOR MEDIAL BUCCAL CHEEK
LOCATION DETAILED: UPPER STERNUM
LOCATION DETAILED: LEFT INFERIOR CENTRAL MALAR CHEEK
LOCATION DETAILED: RIGHT FOREHEAD

## 2021-03-10 ASSESSMENT — SEVERITY ASSESSMENT OVERALL AMONG ALL PATIENTS
IN YOUR EXPERIENCE, AMONG ALL PATIENTS YOU HAVE SEEN WITH THIS CONDITION, HOW SEVERE IS THIS PATIENT'S CONDITION?: FEW INFLAMMATORY LESIONS, SOME NONINFLAMMATORY

## 2021-03-10 ASSESSMENT — LOCATION ZONE DERM
LOCATION ZONE: FACE
LOCATION ZONE: TRUNK

## 2021-03-10 NOTE — PROCEDURE: COUNSELING
Isotretinoin Counseling: Patient should get monthly blood tests, not donate blood, not drive at night if vision affected, not share medication, and not undergo elective surgery for 6 months after tx completed. Side effects reviewed, pt to contact office should one occur.
Minocycline Pregnancy And Lactation Text: This medication is Pregnancy Category D and not consider safe during pregnancy. It is also excreted in breast milk.
Minocycline Counseling: Patient advised regarding possible photosensitivity and discoloration of the teeth, skin, lips, tongue and gums.  Patient instructed to avoid sunlight, if possible.  When exposed to sunlight, patients should wear protective clothing, sunglasses, and sunscreen.  The patient was instructed to call the office immediately if the following severe adverse effects occur:  hearing changes, easy bruising/bleeding, severe headache, or vision changes.  The patient verbalized understanding of the proper use and possible adverse effects of minocycline.  All of the patient's questions and concerns were addressed.
Tetracycline Counseling: Patient counseled regarding possible photosensitivity and increased risk for sunburn.  Patient instructed to avoid sunlight, if possible.  When exposed to sunlight, patients should wear protective clothing, sunglasses, and sunscreen.  The patient was instructed to call the office immediately if the following severe adverse effects occur:  hearing changes, easy bruising/bleeding, severe headache, or vision changes.  The patient verbalized understanding of the proper use and possible adverse effects of tetracycline.  All of the patient's questions and concerns were addressed. Patient understands to avoid pregnancy while on therapy due to potential birth defects.
Isotretinoin Pregnancy And Lactation Text: This medication is Pregnancy Category X and is considered extremely dangerous during pregnancy. It is unknown if it is excreted in breast milk.
High Dose Vitamin A Counseling: Side effects reviewed, pt to contact office should one occur.
Erythromycin Pregnancy And Lactation Text: This medication is Pregnancy Category B and is considered safe during pregnancy. It is also excreted in breast milk.
Sarecycline Counseling: Patient advised regarding possible photosensitivity and discoloration of the teeth, skin, lips, tongue and gums.  Patient instructed to avoid sunlight, if possible.  When exposed to sunlight, patients should wear protective clothing, sunglasses, and sunscreen.  The patient was instructed to call the office immediately if the following severe adverse effects occur:  hearing changes, easy bruising/bleeding, severe headache, or vision changes.  The patient verbalized understanding of the proper use and possible adverse effects of sarecycline.  All of the patient's questions and concerns were addressed.
Include Pregnancy/Lactation Warning?: No
Azithromycin Pregnancy And Lactation Text: This medication is considered safe during pregnancy and is also secreted in breast milk.
Benzoyl Peroxide Pregnancy And Lactation Text: This medication is Pregnancy Category C. It is unknown if benzoyl peroxide is excreted in breast milk.
Doxycycline Pregnancy And Lactation Text: This medication is Pregnancy Category D and not consider safe during pregnancy. It is also excreted in breast milk but is considered safe for shorter treatment courses.
Bactrim Counseling:  I discussed with the patient the risks of sulfa antibiotics including but not limited to GI upset, allergic reaction, drug rash, diarrhea, dizziness, photosensitivity, and yeast infections.  Rarely, more serious reactions can occur including but not limited to aplastic anemia, agranulocytosis, methemoglobinemia, blood dyscrasias, liver or kidney failure, lung infiltrates or desquamative/blistering drug rashes.
Topical Clindamycin Counseling: Patient counseled that this medication may cause skin irritation or allergic reactions.  In the event of skin irritation, the patient was advised to reduce the amount of the drug applied or use it less frequently.   The patient verbalized understanding of the proper use and possible adverse effects of clindamycin.  All of the patient's questions and concerns were addressed.
Topical Sulfur Applications Pregnancy And Lactation Text: This medication is Pregnancy Category C and has an unknown safety profile during pregnancy. It is unknown if this topical medication is excreted in breast milk.
Bactrim Pregnancy And Lactation Text: This medication is Pregnancy Category D and is known to cause fetal risk.  It is also excreted in breast milk.
Dapsone Pregnancy And Lactation Text: This medication is Pregnancy Category C and is not considered safe during pregnancy or breast feeding.
Doxycycline Counseling:  Patient counseled regarding possible photosensitivity and increased risk for sunburn.  Patient instructed to avoid sunlight, if possible.  When exposed to sunlight, patients should wear protective clothing, sunglasses, and sunscreen.  The patient was instructed to call the office immediately if the following severe adverse effects occur:  hearing changes, easy bruising/bleeding, severe headache, or vision changes.  The patient verbalized understanding of the proper use and possible adverse effects of doxycycline.  All of the patient's questions and concerns were addressed.
Azithromycin Counseling:  I discussed with the patient the risks of azithromycin including but not limited to GI upset, allergic reaction, drug rash, diarrhea, and yeast infections.
Topical Retinoid counseling:  Patient advised to apply a pea-sized amount only at bedtime and wait 30 minutes after washing their face before applying.  If too drying, patient may add a non-comedogenic moisturizer. The patient verbalized understanding of the proper use and possible adverse effects of retinoids.  All of the patient's questions and concerns were addressed.
Birth Control Pills Counseling: Birth Control Pill Counseling: I discussed with the patient the potential side effects of OCPs including but not limited to increased risk of stroke, heart attack, thrombophlebitis, deep venous thrombosis, hepatic adenomas, breast changes, GI upset, headaches, and depression.  The patient verbalized understanding of the proper use and possible adverse effects of OCPs. All of the patient's questions and concerns were addressed.
Detail Level: Detailed
Topical Clindamycin Pregnancy And Lactation Text: This medication is Pregnancy Category B and is considered safe during pregnancy. It is unknown if it is excreted in breast milk.
Birth Control Pills Pregnancy And Lactation Text: This medication should be avoided if pregnant and for the first 30 days post-partum.
Benzoyl Peroxide Counseling: Patient counseled that medicine may cause skin irritation and bleach clothing.  In the event of skin irritation, the patient was advised to reduce the amount of the drug applied or use it less frequently.   The patient verbalized understanding of the proper use and possible adverse effects of benzoyl peroxide.  All of the patient's questions and concerns were addressed.
Topical Retinoid Pregnancy And Lactation Text: This medication is Pregnancy Category C. It is unknown if this medication is excreted in breast milk.
Erythromycin Counseling:  I discussed with the patient the risks of erythromycin including but not limited to GI upset, allergic reaction, drug rash, diarrhea, increase in liver enzymes, and yeast infections.
Spironolactone Counseling: Patient advised regarding risks of diarrhea, abdominal pain, hyperkalemia, birth defects (for female patients), liver toxicity and renal toxicity. The patient may need blood work to monitor liver and kidney function and potassium levels while on therapy. The patient verbalized understanding of the proper use and possible adverse effects of spironolactone.  All of the patient's questions and concerns were addressed.
Tazorac Counseling:  Patient advised that medication is irritating and drying.  Patient may need to apply sparingly and wash off after an hour before eventually leaving it on overnight.  The patient verbalized understanding of the proper use and possible adverse effects of tazorac.  All of the patient's questions and concerns were addressed.
High Dose Vitamin A Pregnancy And Lactation Text: High dose vitamin A therapy is contraindicated during pregnancy and breast feeding.
Tazorac Pregnancy And Lactation Text: This medication is not safe during pregnancy. It is unknown if this medication is excreted in breast milk.
Spironolactone Pregnancy And Lactation Text: This medication can cause feminization of the male fetus and should be avoided during pregnancy. The active metabolite is also found in breast milk.
Topical Sulfur Applications Counseling: Topical Sulfur Counseling: Patient counseled that this medication may cause skin irritation or allergic reactions.  In the event of skin irritation, the patient was advised to reduce the amount of the drug applied or use it less frequently.   The patient verbalized understanding of the proper use and possible adverse effects of topical sulfur application.  All of the patient's questions and concerns were addressed.
Dapsone Counseling: I discussed with the patient the risks of dapsone including but not limited to hemolytic anemia, agranulocytosis, rashes, methemoglobinemia, kidney failure, peripheral neuropathy, headaches, GI upset, and liver toxicity.  Patients who start dapsone require monitoring including baseline LFTs and weekly CBCs for the first month, then every month thereafter.  The patient verbalized understanding of the proper use and possible adverse effects of dapsone.  All of the patient's questions and concerns were addressed.

## 2021-03-10 NOTE — PROCEDURE: TREATMENT REGIMEN
Samples Given: Epiduo forte - please call if you like samples for a rx
Discontinue Regimen: Epiduo
Initiate Treatment: Epiduo forte in the PM
Detail Level: Zone

## 2021-03-10 NOTE — PROCEDURE: SUNSCREEN RECOMMENDATIONS

## 2021-03-16 RX ORDER — NORETHINDRONE ACETATE AND ETHINYL ESTRADIOL, ETHINYL ESTRADIOL AND FERROUS FUMARATE 1MG-10(24)
1 KIT ORAL DAILY
Qty: 3 PACKAGE | Refills: 3 | Status: CANCELLED | OUTPATIENT
Start: 2021-03-16

## 2021-03-16 NOTE — PATIENT INSTRUCTIONS
Well Visit, Ages 25 to 48: Care Instructions Your Care Instructions Physical exams can help you stay healthy. Your doctor has checked your overall health and may have suggested ways to take good care of yourself. He or she also may have recommended tests. At home, you can help prevent illness with healthy eating, regular exercise, and other steps. Follow-up care is a key part of your treatment and safety. Be sure to make and go to all appointments, and call your doctor if you are having problems. It's also a good idea to know your test results and keep a list of the medicines you take. How can you care for yourself at home? · Reach and stay at a healthy weight. This will lower your risk for many problems, such as obesity, diabetes, heart disease, and high blood pressure. · Get at least 30 minutes of physical activity on most days of the week. Walking is a good choice. You also may want to do other activities, such as running, swimming, cycling, or playing tennis or team sports. Discuss any changes in your exercise program with your doctor. · Do not smoke or allow others to smoke around you. If you need help quitting, talk to your doctor about stop-smoking programs and medicines. These can increase your chances of quitting for good. · Talk to your doctor about whether you have any risk factors for sexually transmitted infections (STIs). Having one sex partner (who does not have STIs and does not have sex with anyone else) is a good way to avoid these infections. · Use birth control if you do not want to have children at this time. Talk with your doctor about the choices available and what might be best for you. · Protect your skin from too much sun. When you're outdoors from 10 a.m. to 4 p.m., stay in the shade or cover up with clothing and a hat with a wide brim. Wear sunglasses that block UV rays. Even when it's cloudy, put broad-spectrum sunscreen (SPF 30 or higher) on any exposed skin.  
· See a dentist one or two times a year for checkups and to have your teeth cleaned. · Wear a seat belt in the car. Follow your doctor's advice about when to have certain tests. These tests can spot problems early. For everyone · Cholesterol. Have the fat (cholesterol) in your blood tested after age 21. Your doctor will tell you how often to have this done based on your age, family history, or other things that can increase your risk for heart disease. · Blood pressure. Have your blood pressure checked during a routine doctor visit. Your doctor will tell you how often to check your blood pressure based on your age, your blood pressure results, and other factors. · Vision. Talk with your doctor about how often to have a glaucoma test. 
· Diabetes. Ask your doctor whether you should have tests for diabetes. · Colon cancer. Your risk for colorectal cancer gets higher as you get older. Some experts say that adults should start regular screening at age 48 and stop at age 76. Others say to start before age 48 or continue after age 76. Talk with your doctor about your risk and when to start and stop screening. For women · Breast exam and mammogram. Talk to your doctor about when you should have a clinical breast exam and a mammogram. Medical experts differ on whether and how often women under 50 should have these tests. Your doctor can help you decide what is right for you. · Cervical cancer screening test and pelvic exam. Begin with a Pap test at age 24. The test often is part of a pelvic exam. Starting at age 27, you may choose to have a Pap test, an HPV test, or both tests at the same time (called co-testing). Talk with your doctor about how often to have testing. · Tests for sexually transmitted infections (STIs). Ask whether you should have tests for STIs. You may be at risk if you have sex with more than one person, especially if your partners do not wear condoms. For men · Tests for sexually transmitted infections (STIs). Ask whether you should have tests for STIs. You may be at risk if you have sex with more than one person, especially if you do not wear a condom. · Testicular cancer exam. Ask your doctor whether you should check your testicles regularly. · Prostate exam. Talk to your doctor about whether you should have a blood test (called a PSA test) for prostate cancer. Experts differ on whether and when men should have this test. Some experts suggest it if you are older than 39 and are -American or have a father or brother who got prostate cancer when he was younger than 72. When should you call for help? Watch closely for changes in your health, and be sure to contact your doctor if you have any problems or symptoms that concern you. Where can you learn more? Go to http://www.head.com/ Enter P072 in the search box to learn more about \"Well Visit, Ages 25 to 48: Care Instructions. \" Current as of: May 27, 2020               Content Version: 12.6 © 2006-2020 [employer]. Care instructions adapted under license by [employer] (which disclaims liability or warranty for this information). If you have questions about a medical condition or this instruction, always ask your healthcare professional. [place] [address] any warranty or liability for your use of this information. Well Visit, Ages 25 to 48: Care Instructions Your Care Instructions Physical exams can help you stay healthy. Your doctor has checked your overall health and may have suggested ways to take good care of yourself. He or she also may have recommended tests. At home, you can help prevent illness with healthy eating, regular exercise, and other steps. Follow-up care is a key part of your treatment and safety. Be sure to make and go to all appointments, and call your doctor if you are having problems.  It's also a good idea to know your test results and keep a list of the medicines you take. How can you care for yourself at home? · Reach and stay at a healthy weight. This will lower your risk for many problems, such as obesity, diabetes, heart disease, and high blood pressure. · Get at least 30 minutes of physical activity on most days of the week. Walking is a good choice. You also may want to do other activities, such as running, swimming, cycling, or playing tennis or team sports. Discuss any changes in your exercise program with your doctor. · Do not smoke or allow others to smoke around you. If you need help quitting, talk to your doctor about stop-smoking programs and medicines. These can increase your chances of quitting for good. · Talk to your doctor about whether you have any risk factors for sexually transmitted infections (STIs). Having one sex partner (who does not have STIs and does not have sex with anyone else) is a good way to avoid these infections. · Use birth control if you do not want to have children at this time. Talk with your doctor about the choices available and what might be best for you. · Protect your skin from too much sun. When you're outdoors from 10 a.m. to 4 p.m., stay in the shade or cover up with clothing and a hat with a wide brim. Wear sunglasses that block UV rays. Even when it's cloudy, put broad-spectrum sunscreen (SPF 30 or higher) on any exposed skin. · See a dentist one or two times a year for checkups and to have your teeth cleaned. · Wear a seat belt in the car. Follow your doctor's advice about when to have certain tests. These tests can spot problems early. For everyone · Cholesterol. Have the fat (cholesterol) in your blood tested after age 21. Your doctor will tell you how often to have this done based on your age, family history, or other things that can increase your risk for heart disease. · Blood pressure. Have your blood pressure checked during a routine doctor visit.  Your doctor will tell you how often to check your blood pressure based on your age, your blood pressure results, and other factors. · Vision. Talk with your doctor about how often to have a glaucoma test. 
· Diabetes. Ask your doctor whether you should have tests for diabetes. · Colon cancer. Your risk for colorectal cancer gets higher as you get older. Some experts say that adults should start regular screening at age 48 and stop at age 76. Others say to start before age 48 or continue after age 76. Talk with your doctor about your risk and when to start and stop screening. For women · Breast exam and mammogram. Talk to your doctor about when you should have a clinical breast exam and a mammogram. Medical experts differ on whether and how often women under 50 should have these tests. Your doctor can help you decide what is right for you. · Cervical cancer screening test and pelvic exam. Begin with a Pap test at age 24. The test often is part of a pelvic exam. Starting at age 27, you may choose to have a Pap test, an HPV test, or both tests at the same time (called co-testing). Talk with your doctor about how often to have testing. · Tests for sexually transmitted infections (STIs). Ask whether you should have tests for STIs. You may be at risk if you have sex with more than one person, especially if your partners do not wear condoms. For men · Tests for sexually transmitted infections (STIs). Ask whether you should have tests for STIs. You may be at risk if you have sex with more than one person, especially if you do not wear a condom. · Testicular cancer exam. Ask your doctor whether you should check your testicles regularly. · Prostate exam. Talk to your doctor about whether you should have a blood test (called a PSA test) for prostate cancer.  Experts differ on whether and when men should have this test. Some experts suggest it if you are older than 39 and are -American or have a father or brother who got prostate cancer when he was younger than 72. When should you call for help? Watch closely for changes in your health, and be sure to contact your doctor if you have any problems or symptoms that concern you. Where can you learn more? Go to http://www.[employer].com/ Enter P072 in the search box to learn more about \"Well Visit, Ages 25 to 48: Care Instructions. \" Current as of: May 27, 2020               Content Version: 12.6 © 2006-2020 [employer]. Care instructions adapted under license by [employer] (which disclaims liability or warranty for this information). If you have questions about a medical condition or this instruction, always ask your healthcare professional. [address] any warranty or liability for your use of this information.

## 2021-03-17 ENCOUNTER — OFFICE VISIT (OUTPATIENT)
Dept: OBGYN CLINIC | Age: 36
End: 2021-03-17
Payer: COMMERCIAL

## 2021-03-17 VITALS
HEIGHT: 67 IN | DIASTOLIC BLOOD PRESSURE: 62 MMHG | BODY MASS INDEX: 23.23 KG/M2 | WEIGHT: 148 LBS | SYSTOLIC BLOOD PRESSURE: 98 MMHG

## 2021-03-17 DIAGNOSIS — Z01.419 WELL FEMALE EXAM WITH ROUTINE GYNECOLOGICAL EXAM: Primary | ICD-10-CM

## 2021-03-17 DIAGNOSIS — N76.0 ACUTE VAGINITIS: ICD-10-CM

## 2021-03-17 DIAGNOSIS — N92.4 EXCESSIVE BLEEDING IN PREMENOPAUSAL PERIOD: ICD-10-CM

## 2021-03-17 PROCEDURE — 99395 PREV VISIT EST AGE 18-39: CPT | Performed by: OBSTETRICS & GYNECOLOGY

## 2021-03-17 NOTE — PROGRESS NOTES
[facility]  [email or web address]  [phone]    Janna Tiwari MD, FACOG       Annual Gynecologic Exam:  WWE <40  Chief Complaint   Patient presents with    Well Woman         Loi Clancy is a 30 y.o.  WHITE female who presents for an annual well woman exam.  No LMP recorded. .    She reports the following additional concerns: noticing to be more prone to yeast infections 1 week pre menstrual cycle x2-3 months; using Monistat with improvement and sxs resolve prior to start of cycle. Not taking OCP Lo Loestrin. States it was a trial for heavy bleeding, but caused nausea therefore she stopped taking it. Has burning with yeast infection. Bleeding heavy day 1, inc discharge. She reports no new sexual partners. Menstrual status:  She does not report dysmenorrhea/painful menses. She does report heavy menses \" for the first day or so of cycle\". She does not report irregular bleeding. Sexual history and Contraception:  Social History     Substance and Sexual Activity   Sexual Activity Yes    Partners: Male    Birth control/protection: Surgical    Comment: Vasectomy       She does not reports new sexual partner(s) in the last year. Preventive Medicine History:  Her most recent Pap smear result: normal was obtained in 2018  Her most recent HR HPV screen was Negative obtained in 2018    She does not have a history of ALAINA 2, 3 or cervical cancer. Past Medical History:   Diagnosis Date    Acne     Anemia     Contusion of left knee 2013    Eczema 2013    Encounter for supervision of other normal pregnancy, unspecified trimester 3/5/2018    Declined genetic testing GERD zantac Nausea zofran [facility], post, no longer previa <2cm, fu 34 wks (rafael cole) 18 1 hr gtt #136; 3 hr gtt 18 WNL-80, 131, 124, 78 Hgb 10.5 @ 29wks neg k-b 18 MFM appt 18 @ 8:45am for elevated BP/anxiety--pt notified in office. Induction 10/3/18.  Pt notified.  GERD (gastroesophageal reflux disease) 2015    Migraine 2013    no aura, worse on cycle,     Right knee meniscal tear     resolved w physical therapy. BBall injury    Right wrist fracture , 2000    x2    Scoliosis     mild    Screening for cervical cancer 2010, 3/8/2012    normal. Dr. Rangel Shea     OB History    Para Term  AB Living   2 2 2 0 0 2   SAB TAB Ectopic Molar Multiple Live Births   0 0 0   0 2      # Outcome Date GA Lbr Faraz/2nd Weight Sex Delivery Anes PTL Lv   2 Term 10/01/18 40w1d 02:35 / 00:15 8 lb 2.5 oz (3.7 kg) F Vag-Spont EPIDURAL AN  [place]   1 Term 06/24/15 40w5d   F Vag-Spont   [place]      Obstetric Comments   PPH      History reviewed. No pertinent surgical history.   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   Social Needs    Financial resource strain: Not on file    Food insecurity     Worry: Not on file     Inability: Not on file   [employer] needs     Medical: Not on file     Non-medical: Not on file   Tobacco Use    Smoking status: Never Smoker    Smokeless tobacco: Never Used   Substance and Sexual Activity    Alcohol use: No    Drug use: No    Sexual activity: Yes     Partners: Male     Birth control/protection: Surgical     Comment: Vasectomy   Lifestyle    Physical activity     Days per week: Not on file     Minutes per session: Not on file    Stress: Not on file   Relationships    Social connections     Talks on phone: Not on file     Gets together: Not on file     Attends Yazdanism service: Not on file     Active member of club or organization: Not on file     Attends meetings of clubs or organizations: Not on file     Relationship status: Not on file    Intimate partner violence     Fear of current or ex partner: Not on file     Emotionally abused: Not on file     Physically abused: Not on file     Forced sexual activity: Not on file   Other Topics Concern    Not on file   Social History Narrative    Not on file       Allergies   Allergen Reactions    Bactrim [Sulfamethoprim] Hives    Gardasil [H Papillomavirus Vac,Qval (Pf)] Other (comments)     syncope       Current Outpatient Medications   Medication Sig    adapalene-benzoyl peroxide (Epiduo) 0.1-2.5 % glwp 1 Dose by Apply Externally route daily. Apply small amount daily    albuterol (PROVENTIL HFA, VENTOLIN HFA, PROAIR HFA) 90 mcg/actuation inhaler Take 2 Puffs by inhalation every four (4) hours as needed for Wheezing.  OTHER Collagen gummies PO    multivitamin (ONE A DAY) tablet Take 1 Tab by mouth daily. No current facility-administered medications for this visit. Patient Active Problem List   Diagnosis Code    Acne L70.9    Migraine G43.909    Eczema L30.9    Contusion of left knee S80. 0XA    GERD (gastroesophageal reflux disease) K21.9    Scoliosis M41.9         Review of Systems - History obtained from the patient and patient filled out questionnaire   Constitutional/general, HEENT, CV, Resp, GI, MSK, Neuro, Psych, Heme/lymph, Skin, Breast ROS: no significant complaints except as noted on HPI    Physical Exam  Visit Vitals  BP 98/62 (BP 1 Location: Left upper arm, BP Patient Position: Sitting, BP Cuff Size: Adult)   Ht 5' 7\" (1.702 m)   Wt 148 lb (67.1 kg)   BMI 23.18 kg/m²       Constitutional  · Appearance: well-nourished, well developed, alert, in no acute distress    HENT  · Head and Face: appears normal    Neck  · Inspection/Palpation: normal appearance, no masses or tenderness  · Lymph Nodes: no lymphadenopathy present  · Thyroid: gland size normal, nontender, no nodules or masses present on palpation    Chest  · Respiratory Effort: breathing unlabored  · Auscultation: normal breath sounds    Cardiovascular  · Heart:  · Auscultation: regular rate and rhythm without murmur    Breasts  · Inspection of Breasts: breasts symmetrical, no skin changes, no discharge present, nipple appearance normal, no skin retraction present  · Palpation of Breasts and Axillae: no masses present on palpation, no breast tenderness  · Axillary Lymph Nodes: no lymphadenopathy present    Gastrointestinal  · Abdominal Examination: abdomen non-tender to palpation, normal bowel sounds, no masses present  · Liver and spleen: no hepatomegaly present, spleen not palpable  · Hernias: no hernias identified    Genitourinary  · External Genitalia: normal appearance for age, no discharge present, no tenderness present, no inflammatory lesions present, no masses present  · Vagina: normal vaginal vault without central or paravaginal defects, white discharge present, no inflammatory lesions present, no masses present  · Bladder: non-tender to palpation  · Urethra: appears normal  · Cervix: normal   · Uterus: normal size, shape and consistency  · Adnexa: no adnexal tenderness present, no adnexal masses present  · Perineum: perineum within normal limits, no evidence of trauma, no rashes or skin lesions present  · Anus: anus within normal limits, no hemorrhoids present  · Inguinal Lymph Nodes: no lymphadenopathy present    Skin  · General Inspection: no rash, no lesions identified    Neurologic/Psychiatric  · Mental Status:  · Orientation: grossly oriented to person, place and time  · Mood and Affect: mood normal, affect appropriate    Assessment:  30 y.o.  for well woman exam  Encounter Diagnoses   Name Primary?     Well female exam with routine gynecological exam Yes    Acute vaginitis     Excessive bleeding in premenopausal period        Plan:  The patient was counseled about diet, exercise, healthy lifestyle  We discussed current pap smear and HR HPV testing guidelines. I recommended follow up one year for routine annual gynecologic exam or sooner prn  Handouts were given to the patient  I recommended follow up with a primary care physician for chronic medical problems and evaluation of non-gynecologic concerns and to please contact our office with any GYN questions or concerns. I recommended testing per CDC guidelines and at patient request.   We discussed potential causes of vaginal discharge/irritation. We discussed good vulvar hygiene. Recommended avoid vaginal irritants. Discussed use of mild soaps/detergents. Follow up if NI. Patient will be notified about swab results and prescription sent, if indicated. We discussed safer NSAID dosing for heavy cycles. Pt was advised to take this dose with food and not to take for more than 5 days. Disc RBA including bleeding/gastric irritation. MACKENZIE HENDRIX if NI to discuss other options. Disc prog only pill option if interested, pt will try nsaid first    Folllow up:  [x] return for annual well woman exam in one year or sooner if she is having problems  [] follow up and ultrasound  [] 6 months  [] 3 months  [] 6 weeks   [] 1 month    Orders Placed This Encounter    NUSWAB VAGINITIS + HSV ([facility])    PAP IG, APTIMA HPV AND RFX 16/18,45 ([id number])       No results found for any visits on 03/17/21.

## 2021-03-22 LAB
A VAGINAE DNA VAG QL NAA+PROBE: NORMAL SCORE
BVAB2 DNA VAG QL NAA+PROBE: NORMAL SCORE
C ALBICANS DNA VAG QL NAA+PROBE: NEGATIVE
C GLABRATA DNA VAG QL NAA+PROBE: NEGATIVE
C TRACH DNA VAG QL NAA+PROBE: NEGATIVE
HSV1 DNA SPEC QL NAA+PROBE: NEGATIVE
HSV2 DNA SPEC QL NAA+PROBE: NEGATIVE
MEGA1 DNA VAG QL NAA+PROBE: NORMAL SCORE
N GONORRHOEA DNA VAG QL NAA+PROBE: NEGATIVE
SPECIMEN STATUS REPORT, ROLRST: NORMAL
T VAGINALIS DNA VAG QL NAA+PROBE: NEGATIVE

## 2021-03-22 NOTE — PROGRESS NOTES
The results are normal, no clear evidence of vaginal infection  [address] message sent if active. Recommend f/u if still having symptoms/problems or has additional concerns.

## 2021-03-23 LAB
CYTOLOGIST CVX/VAG CYTO: NORMAL
CYTOLOGY CVX/VAG DOC CYTO: NORMAL
CYTOLOGY CVX/VAG DOC THIN PREP: NORMAL
CYTOLOGY HISTORY:: NORMAL
DX ICD CODE: NORMAL
HPV I/H RISK 4 DNA CVX QL PROBE+SIG AMP: NEGATIVE
Lab: NORMAL
OTHER STN SPEC: NORMAL
STAT OF ADQ CVX/VAG CYTO-IMP: NORMAL

## 2021-03-24 NOTE — PROGRESS NOTES
Normal pap smear, message sent if [address] active. Update PMH/HM: include: Date of pap, Cytology: wnl. For HR HPV results: list NEG or POS, when done.

## 2021-04-27 ENCOUNTER — RX ONLY (RX ONLY)
Age: 36
End: 2021-04-27

## 2021-04-27 RX ORDER — ADAPALENE AND BENZOYL PEROXIDE 3; 25 MG/G; MG/G
GEL TOPICAL
Qty: 1 | Refills: 2 | Status: ERX | COMMUNITY
Start: 2021-04-27

## 2021-07-01 ENCOUNTER — APPOINTMENT (OUTPATIENT)
Age: 36
Setting detail: DERMATOLOGY
End: 2021-07-01

## 2021-07-01 DIAGNOSIS — Z71.89 OTHER SPECIFIED COUNSELING: ICD-10-CM

## 2021-07-01 DIAGNOSIS — L72.0 EPIDERMAL CYST: ICD-10-CM

## 2021-07-01 DIAGNOSIS — L70.0 ACNE VULGARIS: ICD-10-CM

## 2021-07-01 DIAGNOSIS — L21.8 OTHER SEBORRHEIC DERMATITIS: ICD-10-CM

## 2021-07-01 DIAGNOSIS — L81.0 POSTINFLAMMATORY HYPERPIGMENTATION: ICD-10-CM

## 2021-07-01 DIAGNOSIS — L72.8 OTHER FOLLICULAR CYSTS OF THE SKIN AND SUBCUTANEOUS TISSUE: ICD-10-CM

## 2021-07-01 PROCEDURE — OTHER TREATMENT REGIMEN: OTHER

## 2021-07-01 PROCEDURE — OTHER SUNSCREEN RECOMMENDATIONS: OTHER

## 2021-07-01 PROCEDURE — OTHER MEDICATION COUNSELING: OTHER

## 2021-07-01 PROCEDURE — OTHER INTRALESIONAL KENALOG: OTHER

## 2021-07-01 PROCEDURE — OTHER PRESCRIPTION: OTHER

## 2021-07-01 PROCEDURE — 11900 INJECT SKIN LESIONS </W 7: CPT

## 2021-07-01 PROCEDURE — 99214 OFFICE O/P EST MOD 30 MIN: CPT | Mod: 25

## 2021-07-01 PROCEDURE — OTHER COUNSELING: OTHER

## 2021-07-01 PROCEDURE — OTHER MIPS QUALITY: OTHER

## 2021-07-01 RX ORDER — TRIFAROTENE 50 UG/G
CREAM TOPICAL
Qty: 1 | Refills: 5 | Status: ERX | COMMUNITY
Start: 2021-07-01

## 2021-07-01 RX ORDER — KETOCONAZOLE 20 MG/G
CREAM TOPICAL BID
Qty: 1 | Refills: 6 | Status: ERX | COMMUNITY
Start: 2021-07-01

## 2021-07-01 ASSESSMENT — LOCATION DETAILED DESCRIPTION DERM
LOCATION DETAILED: SUPERIOR THORACIC SPINE
LOCATION DETAILED: LEFT CENTRAL ZYGOMA
LOCATION DETAILED: LEFT INFERIOR CENTRAL MALAR CHEEK
LOCATION DETAILED: LEFT CENTRAL MALAR CHEEK
LOCATION DETAILED: RIGHT SUPERIOR NASAL CHEEK
LOCATION DETAILED: RIGHT PROXIMAL POSTERIOR UPPER ARM
LOCATION DETAILED: LEFT UPPER CUTANEOUS LIP
LOCATION DETAILED: LEFT PROXIMAL POSTERIOR UPPER ARM
LOCATION DETAILED: RIGHT INFERIOR MEDIAL MALAR CHEEK

## 2021-07-01 ASSESSMENT — LOCATION ZONE DERM
LOCATION ZONE: TRUNK
LOCATION ZONE: ARM
LOCATION ZONE: LIP
LOCATION ZONE: FACE

## 2021-07-01 ASSESSMENT — LOCATION SIMPLE DESCRIPTION DERM
LOCATION SIMPLE: LEFT LIP
LOCATION SIMPLE: LEFT ZYGOMA
LOCATION SIMPLE: RIGHT POSTERIOR UPPER ARM
LOCATION SIMPLE: UPPER BACK
LOCATION SIMPLE: RIGHT CHEEK
LOCATION SIMPLE: LEFT CHEEK
LOCATION SIMPLE: LEFT POSTERIOR UPPER ARM

## 2021-07-01 ASSESSMENT — SEVERITY ASSESSMENT
HOW SEVERE IS THIS PATIENT'S CONDITION?: MILD
SEVERITY: MILD

## 2021-07-01 NOTE — PROCEDURE: MEDICATION COUNSELING
Returned patient's call .Will schedule labs and echo in July.    Nadiya STEPHENS Staff   Caller: Unspecified (Today,  8:49 AM)             Pt scheduled his FU needs lab schedule, Pt prefers [facility] around 10am. Thanks         Finasteride Counseling:  I discussed with the patient the risks of use of finasteride including but not limited to decreased libido, decreased ejaculate volume, gynecomastia, and depression. Women should not handle medication.  All of the patient's questions and concerns were addressed. Finasteride Male Counseling: Finasteride Counseling:  I discussed with the patient the risks of use of finasteride including but not limited to decreased libido, decreased ejaculate volume, gynecomastia, and depression. Women should not handle medication.  All of the patient's questions and concerns were addressed.

## 2021-07-01 NOTE — PROCEDURE: TREATMENT REGIMEN
Detail Level: Zone
Otc Regimen: Alternate CeraVe SA and CeraVe Acne on arms, chest, and back, Glycolic Acid Resurfacing cleanser a few times a week
Initiate Treatment: Ketoconazole cream once a day
Initiate Treatment: PM: mix Aklief with CeraVe SA lotion of Amlactin lotion on back and arms, mix Aklief with gentle moisturizer on face
Discontinue Regimen: Epiduo/epiduo forte
Samples Given: Moisés

## 2021-07-01 NOTE — PROCEDURE: INTRALESIONAL KENALOG
Concentration Of Solution Injected (Mg/Ml): 10.0
Detail Level: Detailed
Treatment Number (Optional): 1
Include Z78.9 (Other Specified Conditions Influencing Health Status) As An Associated Diagnosis?: No
Consent: The risks of atrophy were reviewed with the patient.
X Size Of Lesion In Cm (Optional): 0
Kenalog Preparation: Kenalog
Total Volume Injected (Ccs- Only Use Numbers And Decimals): 0.1
Medical Necessity Clause: This procedure was medically necessary because the lesions that were treated were:
Validate Note Data When Using Inventory: Yes

## 2021-07-01 NOTE — PROCEDURE: COUNSELING
Tazorac Counseling:  Patient advised that medication is irritating and drying.  Patient may need to apply sparingly and wash off after an hour before eventually leaving it on overnight.  The patient verbalized understanding of the proper use and possible adverse effects of tazorac.  All of the patient's questions and concerns were addressed.
Isotretinoin Counseling: Patient should get monthly blood tests, not donate blood, not drive at night if vision affected, not share medication, and not undergo elective surgery for 6 months after tx completed. Side effects reviewed, pt to contact office should one occur.
Topical Retinoid Pregnancy And Lactation Text: This medication is Pregnancy Category C. It is unknown if this medication is excreted in breast milk.
High Dose Vitamin A Counseling: Side effects reviewed, pt to contact office should one occur.
Isotretinoin Pregnancy And Lactation Text: This medication is Pregnancy Category X and is considered extremely dangerous during pregnancy. It is unknown if it is excreted in breast milk.
Minocycline Pregnancy And Lactation Text: This medication is Pregnancy Category D and not consider safe during pregnancy. It is also excreted in breast milk.
Birth Control Pills Pregnancy And Lactation Text: This medication should be avoided if pregnant and for the first 30 days post-partum.
Dapsone Counseling: I discussed with the patient the risks of dapsone including but not limited to hemolytic anemia, agranulocytosis, rashes, methemoglobinemia, kidney failure, peripheral neuropathy, headaches, GI upset, and liver toxicity.  Patients who start dapsone require monitoring including baseline LFTs and weekly CBCs for the first month, then every month thereafter.  The patient verbalized understanding of the proper use and possible adverse effects of dapsone.  All of the patient's questions and concerns were addressed.
Erythromycin Counseling:  I discussed with the patient the risks of erythromycin including but not limited to GI upset, allergic reaction, drug rash, diarrhea, increase in liver enzymes, and yeast infections.
Erythromycin Pregnancy And Lactation Text: This medication is Pregnancy Category B and is considered safe during pregnancy. It is also excreted in breast milk.
Birth Control Pills Counseling: Birth Control Pill Counseling: I discussed with the patient the potential side effects of OCPs including but not limited to increased risk of stroke, heart attack, thrombophlebitis, deep venous thrombosis, hepatic adenomas, breast changes, GI upset, headaches, and depression.  The patient verbalized understanding of the proper use and possible adverse effects of OCPs. All of the patient's questions and concerns were addressed.
Benzoyl Peroxide Counseling: Patient counseled that medicine may cause skin irritation and bleach clothing.  In the event of skin irritation, the patient was advised to reduce the amount of the drug applied or use it less frequently.   The patient verbalized understanding of the proper use and possible adverse effects of benzoyl peroxide.  All of the patient's questions and concerns were addressed.
Spironolactone Pregnancy And Lactation Text: This medication can cause feminization of the male fetus and should be avoided during pregnancy. The active metabolite is also found in breast milk.
Use Enhanced Medication Counseling?: No
Detail Level: Detailed
High Dose Vitamin A Pregnancy And Lactation Text: High dose vitamin A therapy is contraindicated during pregnancy and breast feeding.
Sarecycline Counseling: Patient advised regarding possible photosensitivity and discoloration of the teeth, skin, lips, tongue and gums.  Patient instructed to avoid sunlight, if possible.  When exposed to sunlight, patients should wear protective clothing, sunglasses, and sunscreen.  The patient was instructed to call the office immediately if the following severe adverse effects occur:  hearing changes, easy bruising/bleeding, severe headache, or vision changes.  The patient verbalized understanding of the proper use and possible adverse effects of sarecycline.  All of the patient's questions and concerns were addressed.
Bactrim Counseling:  I discussed with the patient the risks of sulfa antibiotics including but not limited to GI upset, allergic reaction, drug rash, diarrhea, dizziness, photosensitivity, and yeast infections.  Rarely, more serious reactions can occur including but not limited to aplastic anemia, agranulocytosis, methemoglobinemia, blood dyscrasias, liver or kidney failure, lung infiltrates or desquamative/blistering drug rashes.
Topical Clindamycin Pregnancy And Lactation Text: This medication is Pregnancy Category B and is considered safe during pregnancy. It is unknown if it is excreted in breast milk.
Bactrim Pregnancy And Lactation Text: This medication is Pregnancy Category D and is known to cause fetal risk.  It is also excreted in breast milk.
Tazorac Pregnancy And Lactation Text: This medication is not safe during pregnancy. It is unknown if this medication is excreted in breast milk.
Minocycline Counseling: Patient advised regarding possible photosensitivity and discoloration of the teeth, skin, lips, tongue and gums.  Patient instructed to avoid sunlight, if possible.  When exposed to sunlight, patients should wear protective clothing, sunglasses, and sunscreen.  The patient was instructed to call the office immediately if the following severe adverse effects occur:  hearing changes, easy bruising/bleeding, severe headache, or vision changes.  The patient verbalized understanding of the proper use and possible adverse effects of minocycline.  All of the patient's questions and concerns were addressed.
Azithromycin Pregnancy And Lactation Text: This medication is considered safe during pregnancy and is also secreted in breast milk.
Doxycycline Counseling:  Patient counseled regarding possible photosensitivity and increased risk for sunburn.  Patient instructed to avoid sunlight, if possible.  When exposed to sunlight, patients should wear protective clothing, sunglasses, and sunscreen.  The patient was instructed to call the office immediately if the following severe adverse effects occur:  hearing changes, easy bruising/bleeding, severe headache, or vision changes.  The patient verbalized understanding of the proper use and possible adverse effects of doxycycline.  All of the patient's questions and concerns were addressed.
Topical Sulfur Applications Counseling: Topical Sulfur Counseling: Patient counseled that this medication may cause skin irritation or allergic reactions.  In the event of skin irritation, the patient was advised to reduce the amount of the drug applied or use it less frequently.   The patient verbalized understanding of the proper use and possible adverse effects of topical sulfur application.  All of the patient's questions and concerns were addressed.
Dapsone Pregnancy And Lactation Text: This medication is Pregnancy Category C and is not considered safe during pregnancy or breast feeding.
Azithromycin Counseling:  I discussed with the patient the risks of azithromycin including but not limited to GI upset, allergic reaction, drug rash, diarrhea, and yeast infections.
Tetracycline Counseling: Patient counseled regarding possible photosensitivity and increased risk for sunburn.  Patient instructed to avoid sunlight, if possible.  When exposed to sunlight, patients should wear protective clothing, sunglasses, and sunscreen.  The patient was instructed to call the office immediately if the following severe adverse effects occur:  hearing changes, easy bruising/bleeding, severe headache, or vision changes.  The patient verbalized understanding of the proper use and possible adverse effects of tetracycline.  All of the patient's questions and concerns were addressed. Patient understands to avoid pregnancy while on therapy due to potential birth defects.
Topical Clindamycin Counseling: Patient counseled that this medication may cause skin irritation or allergic reactions.  In the event of skin irritation, the patient was advised to reduce the amount of the drug applied or use it less frequently.   The patient verbalized understanding of the proper use and possible adverse effects of clindamycin.  All of the patient's questions and concerns were addressed.
Spironolactone Counseling: Patient advised regarding risks of diarrhea, abdominal pain, hyperkalemia, birth defects (for female patients), liver toxicity and renal toxicity. The patient may need blood work to monitor liver and kidney function and potassium levels while on therapy. The patient verbalized understanding of the proper use and possible adverse effects of spironolactone.  All of the patient's questions and concerns were addressed.
Topical Retinoid counseling:  Patient advised to apply a pea-sized amount only at bedtime and wait 30 minutes after washing their face before applying.  If too drying, patient may add a non-comedogenic moisturizer. The patient verbalized understanding of the proper use and possible adverse effects of retinoids.  All of the patient's questions and concerns were addressed.
Topical Sulfur Applications Pregnancy And Lactation Text: This medication is Pregnancy Category C and has an unknown safety profile during pregnancy. It is unknown if this topical medication is excreted in breast milk.
Benzoyl Peroxide Pregnancy And Lactation Text: This medication is Pregnancy Category C. It is unknown if benzoyl peroxide is excreted in breast milk.
Doxycycline Pregnancy And Lactation Text: This medication is Pregnancy Category D and not consider safe during pregnancy. It is also excreted in breast milk but is considered safe for shorter treatment courses.

## 2021-07-23 ENCOUNTER — APPOINTMENT (OUTPATIENT)
Age: 36
Setting detail: DERMATOLOGY
End: 2021-07-23

## 2021-07-23 DIAGNOSIS — Z71.89 OTHER SPECIFIED COUNSELING: ICD-10-CM

## 2021-07-23 DIAGNOSIS — B36.0 PITYRIASIS VERSICOLOR: ICD-10-CM

## 2021-07-23 DIAGNOSIS — L72.0 EPIDERMAL CYST: ICD-10-CM

## 2021-07-23 PROCEDURE — OTHER MEDICATION COUNSELING: OTHER

## 2021-07-23 PROCEDURE — OTHER TREATMENT REGIMEN: OTHER

## 2021-07-23 PROCEDURE — OTHER COUNSELING: OTHER

## 2021-07-23 PROCEDURE — OTHER MIPS QUALITY: OTHER

## 2021-07-23 PROCEDURE — OTHER ADDITIONAL NOTES: OTHER

## 2021-07-23 PROCEDURE — OTHER ACNE SURGERY COSMETIC: OTHER

## 2021-07-23 PROCEDURE — 99213 OFFICE O/P EST LOW 20 MIN: CPT

## 2021-07-23 PROCEDURE — OTHER PRESCRIPTION: OTHER

## 2021-07-23 PROCEDURE — OTHER SUNSCREEN RECOMMENDATIONS: OTHER

## 2021-07-23 RX ORDER — KETOCONAZOLE 20 MG/ML
SHAMPOO, SUSPENSION TOPICAL BIW
Qty: 1 | Refills: 4 | Status: ERX | COMMUNITY
Start: 2021-07-23

## 2021-07-23 RX ORDER — FLUCONAZOLE 150 MG/1
TABLET ORAL
Qty: 8 | Refills: 0 | Status: ERX | COMMUNITY
Start: 2021-07-23

## 2021-07-23 ASSESSMENT — LOCATION DETAILED DESCRIPTION DERM
LOCATION DETAILED: LEFT LATERAL UPPER BACK
LOCATION DETAILED: LEFT LATERAL ABDOMEN
LOCATION DETAILED: LEFT INFERIOR CENTRAL MALAR CHEEK
LOCATION DETAILED: SUBXIPHOID

## 2021-07-23 ASSESSMENT — LOCATION SIMPLE DESCRIPTION DERM
LOCATION SIMPLE: LEFT CHEEK
LOCATION SIMPLE: LEFT UPPER BACK
LOCATION SIMPLE: ABDOMEN

## 2021-07-23 ASSESSMENT — LOCATION ZONE DERM
LOCATION ZONE: FACE
LOCATION ZONE: TRUNK

## 2021-07-23 NOTE — PROCEDURE: ADDITIONAL NOTES
Additional Notes: Patient paid cosmetic fee today at visit
Detail Level: Simple
Render Risk Assessment In Note?: no

## 2021-07-23 NOTE — HPI: CYST (MILIA)
How Severe Is It?: mild
Is This A New Presentation, Or A Follow-Up?: Follow Up Milia
Additional History: Pt presents today for cosmetic removal of milia

## 2021-07-23 NOTE — PROCEDURE: ACNE SURGERY COSMETIC
Detail Level: Detailed
Render Post-Care Instructions In Note?: no
Acne Type: Comedonal Lesions
Consent was obtained and risks were reviewed including but not limited to scarring, infection, bleeding, scabbing, incomplete removal, and allergy to anesthesia.
Prep Text (Optional): Prior to removal the treatment areas were prepped in the usual fashion.
Post-Care Instructions: I reviewed with the patient in detail post-care instructions. Patient is to keep the treatment areas dry overnight, and then apply bacitracin twice daily until healed. Patient may apply hydrogen peroxide soaks to remove any crusting.
Extraction Method: 30 gauge needle and comedo extractor
Price (Use Numbers Only, No Special Characters Or $): 179.50

## 2021-07-23 NOTE — HPI: RASH
What Type Of Note Output Would You Prefer (Optional)?: Standard Output
How Severe Is Your Rash?: mild
Is This A New Presentation, Or A Follow-Up?: Rash
Additional History: Patient presents for evaluation of rash to trunk. Patient states jana are slightly itchy, with [place] like flaky hypo pigmented/pink patches. Patient states it’s seems like it’s been intermittent for several months but now it is more chronic.\\nPatient also requests suggestions on makeup/face moisturizer.

## 2021-07-23 NOTE — PROCEDURE: TREATMENT REGIMEN
Initiate Treatment: Fluconozole 150mg and keto shampoo
Samples Given: Divine cream HLA for face (advised to try), also suggested colorscience or [employer] for makeup
Detail Level: Zone

## 2021-07-23 NOTE — PROCEDURE: MEDICATION COUNSELING
See note below.   Oxybutynin Counseling:  I discussed with the patient the risks of oxybutynin including but not limited to skin rash, drowsiness, dry mouth, difficulty urinating, and blurred vision.

## 2021-11-12 ENCOUNTER — APPOINTMENT (OUTPATIENT)
Age: 36
Setting detail: DERMATOLOGY
End: 2021-11-12

## 2021-11-12 DIAGNOSIS — L82.1 OTHER SEBORRHEIC KERATOSIS: ICD-10-CM

## 2021-11-12 DIAGNOSIS — D18.0 HEMANGIOMA: ICD-10-CM

## 2021-11-12 DIAGNOSIS — L72.0 EPIDERMAL CYST: ICD-10-CM

## 2021-11-12 DIAGNOSIS — D22 MELANOCYTIC NEVI: ICD-10-CM

## 2021-11-12 DIAGNOSIS — L70.0 ACNE VULGARIS: ICD-10-CM

## 2021-11-12 DIAGNOSIS — Z71.89 OTHER SPECIFIED COUNSELING: ICD-10-CM

## 2021-11-12 DIAGNOSIS — L81.4 OTHER MELANIN HYPERPIGMENTATION: ICD-10-CM

## 2021-11-12 PROBLEM — D18.01 HEMANGIOMA OF SKIN AND SUBCUTANEOUS TISSUE: Status: ACTIVE | Noted: 2021-11-12

## 2021-11-12 PROBLEM — D22.5 MELANOCYTIC NEVI OF TRUNK: Status: ACTIVE | Noted: 2021-11-12

## 2021-11-12 PROCEDURE — OTHER REASSURANCE: OTHER

## 2021-11-12 PROCEDURE — OTHER RECOMMENDATIONS: OTHER

## 2021-11-12 PROCEDURE — OTHER OBSERVATION: OTHER

## 2021-11-12 PROCEDURE — OTHER COUNSELING: OTHER

## 2021-11-12 PROCEDURE — OTHER SUNSCREEN RECOMMENDATIONS: OTHER

## 2021-11-12 PROCEDURE — OTHER TREATMENT REGIMEN: OTHER

## 2021-11-12 PROCEDURE — OTHER MEDICATION COUNSELING: OTHER

## 2021-11-12 PROCEDURE — OTHER PRESCRIPTION: OTHER

## 2021-11-12 PROCEDURE — OTHER MIPS QUALITY: OTHER

## 2021-11-12 PROCEDURE — 99213 OFFICE O/P EST LOW 20 MIN: CPT

## 2021-11-12 RX ORDER — TRIFAROTENE 50 UG/G
CREAM TOPICAL
Qty: 45 | Refills: 6 | Status: ERX | COMMUNITY
Start: 2021-11-12

## 2021-11-12 RX ORDER — CLINDAMYCIN 1 G/10ML
GEL TOPICAL
Qty: 75 | Refills: 3 | Status: ERX | COMMUNITY
Start: 2021-11-12

## 2021-11-12 ASSESSMENT — LOCATION ZONE DERM
LOCATION ZONE: FACE
LOCATION ZONE: TRUNK

## 2021-11-12 ASSESSMENT — LOCATION DETAILED DESCRIPTION DERM
LOCATION DETAILED: LEFT CENTRAL MALAR CHEEK
LOCATION DETAILED: SUPERIOR THORACIC SPINE
LOCATION DETAILED: RIGHT MID-UPPER BACK
LOCATION DETAILED: RIGHT LATERAL MALAR CHEEK
LOCATION DETAILED: LEFT INFERIOR MEDIAL MIDBACK

## 2021-11-12 ASSESSMENT — LOCATION SIMPLE DESCRIPTION DERM
LOCATION SIMPLE: RIGHT UPPER BACK
LOCATION SIMPLE: LEFT LOWER BACK
LOCATION SIMPLE: UPPER BACK
LOCATION SIMPLE: LEFT CHEEK
LOCATION SIMPLE: RIGHT CHEEK

## 2021-11-12 NOTE — HPI: FULL BODY SKIN EXAMINATION
How Severe Are Your Spot(S)?: mild
What Type Of Note Output Would You Prefer (Optional)?: Standard Output
What Is The Reason For Today's Visit?: Full Body Skin Examination
What Is The Reason For Today's Visit? (Being Monitored For X): the development of a new lesion
Additional History: Pt presents today for TBE and skin cancer surveillance due to hx of chronic sun exposure, family hx of skin cancer, and the development of new skin lesions.

## 2021-11-12 NOTE — PROCEDURE: COUNSELING
Minocycline Pregnancy And Lactation Text: This medication is Pregnancy Category D and not consider safe during pregnancy. It is also excreted in breast milk.
Birth Control Pills Pregnancy And Lactation Text: This medication should be avoided if pregnant and for the first 30 days post-partum.
Topical Clindamycin Pregnancy And Lactation Text: This medication is Pregnancy Category B and is considered safe during pregnancy. It is unknown if it is excreted in breast milk.
Dapsone Counseling: I discussed with the patient the risks of dapsone including but not limited to hemolytic anemia, agranulocytosis, rashes, methemoglobinemia, kidney failure, peripheral neuropathy, headaches, GI upset, and liver toxicity.  Patients who start dapsone require monitoring including baseline LFTs and weekly CBCs for the first month, then every month thereafter.  The patient verbalized understanding of the proper use and possible adverse effects of dapsone.  All of the patient's questions and concerns were addressed.
Tazorac Pregnancy And Lactation Text: This medication is not safe during pregnancy. It is unknown if this medication is excreted in breast milk.
Minocycline Counseling: Patient advised regarding possible photosensitivity and discoloration of the teeth, skin, lips, tongue and gums.  Patient instructed to avoid sunlight, if possible.  When exposed to sunlight, patients should wear protective clothing, sunglasses, and sunscreen.  The patient was instructed to call the office immediately if the following severe adverse effects occur:  hearing changes, easy bruising/bleeding, severe headache, or vision changes.  The patient verbalized understanding of the proper use and possible adverse effects of minocycline.  All of the patient's questions and concerns were addressed.
Sarecycline Counseling: Patient advised regarding possible photosensitivity and discoloration of the teeth, skin, lips, tongue and gums.  Patient instructed to avoid sunlight, if possible.  When exposed to sunlight, patients should wear protective clothing, sunglasses, and sunscreen.  The patient was instructed to call the office immediately if the following severe adverse effects occur:  hearing changes, easy bruising/bleeding, severe headache, or vision changes.  The patient verbalized understanding of the proper use and possible adverse effects of sarecycline.  All of the patient's questions and concerns were addressed.
Topical Clindamycin Counseling: Patient counseled that this medication may cause skin irritation or allergic reactions.  In the event of skin irritation, the patient was advised to reduce the amount of the drug applied or use it less frequently.   The patient verbalized understanding of the proper use and possible adverse effects of clindamycin.  All of the patient's questions and concerns were addressed.
Spironolactone Counseling: Patient advised regarding risks of diarrhea, abdominal pain, hyperkalemia, birth defects (for female patients), liver toxicity and renal toxicity. The patient may need blood work to monitor liver and kidney function and potassium levels while on therapy. The patient verbalized understanding of the proper use and possible adverse effects of spironolactone.  All of the patient's questions and concerns were addressed.
Dapsone Pregnancy And Lactation Text: This medication is Pregnancy Category C and is not considered safe during pregnancy or breast feeding.
Detail Level: Detailed
Bactrim Pregnancy And Lactation Text: This medication is Pregnancy Category D and is known to cause fetal risk.  It is also excreted in breast milk.
Benzoyl Peroxide Pregnancy And Lactation Text: This medication is Pregnancy Category C. It is unknown if benzoyl peroxide is excreted in breast milk.
Detail Level: Generalized
Doxycycline Counseling:  Patient counseled regarding possible photosensitivity and increased risk for sunburn.  Patient instructed to avoid sunlight, if possible.  When exposed to sunlight, patients should wear protective clothing, sunglasses, and sunscreen.  The patient was instructed to call the office immediately if the following severe adverse effects occur:  hearing changes, easy bruising/bleeding, severe headache, or vision changes.  The patient verbalized understanding of the proper use and possible adverse effects of doxycycline.  All of the patient's questions and concerns were addressed.
Doxycycline Pregnancy And Lactation Text: This medication is Pregnancy Category D and not consider safe during pregnancy. It is also excreted in breast milk but is considered safe for shorter treatment courses.
Topical Sulfur Applications Counseling: Topical Sulfur Counseling: Patient counseled that this medication may cause skin irritation or allergic reactions.  In the event of skin irritation, the patient was advised to reduce the amount of the drug applied or use it less frequently.   The patient verbalized understanding of the proper use and possible adverse effects of topical sulfur application.  All of the patient's questions and concerns were addressed.
Erythromycin Counseling:  I discussed with the patient the risks of erythromycin including but not limited to GI upset, allergic reaction, drug rash, diarrhea, increase in liver enzymes, and yeast infections.
High Dose Vitamin A Counseling: Side effects reviewed, pt to contact office should one occur.
Azithromycin Counseling:  I discussed with the patient the risks of azithromycin including but not limited to GI upset, allergic reaction, drug rash, diarrhea, and yeast infections.
Erythromycin Pregnancy And Lactation Text: This medication is Pregnancy Category B and is considered safe during pregnancy. It is also excreted in breast milk.
Isotretinoin Pregnancy And Lactation Text: This medication is Pregnancy Category X and is considered extremely dangerous during pregnancy. It is unknown if it is excreted in breast milk.
Benzoyl Peroxide Counseling: Patient counseled that medicine may cause skin irritation and bleach clothing.  In the event of skin irritation, the patient was advised to reduce the amount of the drug applied or use it less frequently.   The patient verbalized understanding of the proper use and possible adverse effects of benzoyl peroxide.  All of the patient's questions and concerns were addressed.
Spironolactone Pregnancy And Lactation Text: This medication can cause feminization of the male fetus and should be avoided during pregnancy. The active metabolite is also found in breast milk.
Azithromycin Pregnancy And Lactation Text: This medication is considered safe during pregnancy and is also secreted in breast milk.
Include Pregnancy/Lactation Warning?: No
Tazorac Counseling:  Patient advised that medication is irritating and drying.  Patient may need to apply sparingly and wash off after an hour before eventually leaving it on overnight.  The patient verbalized understanding of the proper use and possible adverse effects of tazorac.  All of the patient's questions and concerns were addressed.
Topical Retinoid counseling:  Patient advised to apply a pea-sized amount only at bedtime and wait 30 minutes after washing their face before applying.  If too drying, patient may add a non-comedogenic moisturizer. The patient verbalized understanding of the proper use and possible adverse effects of retinoids.  All of the patient's questions and concerns were addressed.
Tetracycline Counseling: Patient counseled regarding possible photosensitivity and increased risk for sunburn.  Patient instructed to avoid sunlight, if possible.  When exposed to sunlight, patients should wear protective clothing, sunglasses, and sunscreen.  The patient was instructed to call the office immediately if the following severe adverse effects occur:  hearing changes, easy bruising/bleeding, severe headache, or vision changes.  The patient verbalized understanding of the proper use and possible adverse effects of tetracycline.  All of the patient's questions and concerns were addressed. Patient understands to avoid pregnancy while on therapy due to potential birth defects.
Isotretinoin Counseling: Patient should get monthly blood tests, not donate blood, not drive at night if vision affected, not share medication, and not undergo elective surgery for 6 months after tx completed. Side effects reviewed, pt to contact office should one occur.
High Dose Vitamin A Pregnancy And Lactation Text: High dose vitamin A therapy is contraindicated during pregnancy and breast feeding.
Topical Retinoid Pregnancy And Lactation Text: This medication is Pregnancy Category C. It is unknown if this medication is excreted in breast milk.
Topical Sulfur Applications Pregnancy And Lactation Text: This medication is Pregnancy Category C and has an unknown safety profile during pregnancy. It is unknown if this topical medication is excreted in breast milk.
Birth Control Pills Counseling: Birth Control Pill Counseling: I discussed with the patient the potential side effects of OCPs including but not limited to increased risk of stroke, heart attack, thrombophlebitis, deep venous thrombosis, hepatic adenomas, breast changes, GI upset, headaches, and depression.  The patient verbalized understanding of the proper use and possible adverse effects of OCPs. All of the patient's questions and concerns were addressed.
Bactrim Counseling:  I discussed with the patient the risks of sulfa antibiotics including but not limited to GI upset, allergic reaction, drug rash, diarrhea, dizziness, photosensitivity, and yeast infections.  Rarely, more serious reactions can occur including but not limited to aplastic anemia, agranulocytosis, methemoglobinemia, blood dyscrasias, liver or kidney failure, lung infiltrates or desquamative/blistering drug rashes.

## 2021-11-12 NOTE — PROCEDURE: RECOMMENDATIONS
Render Risk Assessment In Note?: no
Recommendation Override: Revision intellashade or MADD self adjusting
Detail Level: Simple
Recommendation Preamble: The following recommendations were made during the visit: Aquaphor, eucerin advanced repair, amlactin

## 2021-11-12 NOTE — PROCEDURE: TREATMENT REGIMEN
Detail Level: Zone
Continue Regimen: AM: clindamycin gel \\nPM: mix Aklief with CeraVe SA lotion of Amlactin lotion on back and arms, mix Aklief with gentle moisturizer on face
Otc Regimen: Alternate CeraVe SA and CeraVe Acne on arms, chest, and back, Glycolic Acid Resurfacing cleanser a few times a week

## 2021-11-12 NOTE — PROCEDURE: MEDICATION COUNSELING
Nsaids Pregnancy And Lactation Text: These medications are considered safe up to 30 weeks gestation. It is excreted in breast milk. no

## 2021-11-12 NOTE — PROCEDURE: MIPS QUALITY
Quality 431: Preventive Care And Screening: Unhealthy Alcohol Use - Screening: Patient not identified as an unhealthy alcohol user when screened for unhealthy alcohol use using a systematic screening method
Quality 226: Preventive Care And Screening: Tobacco Use: Screening And Cessation Intervention: Patient screened for tobacco use and is an ex/non-smoker
Detail Level: Detailed
Quality 431: Preventive Care And Screening: Unhealthy Alcohol Use - Screening: Patient screened for unhealthy alcohol use using a single question and scores less than 2 times per year
Quality 110: Preventive Care And Screening: Influenza Immunization: Influenza Immunization Administered during Influenza season
Quality 130: Documentation Of Current Medications In The Medical Record: Current Medications Documented

## 2021-12-08 NOTE — PROCEDURE: MEDICATION COUNSELING
yes... Minoxidil Counseling: Minoxidil is a topical medication which can increase blood flow where it is applied. It is uncertain how this medication increases hair growth. Side effects are uncommon and include stinging and allergic reactions.

## 2021-12-27 DIAGNOSIS — J01.00 SUBACUTE MAXILLARY SINUSITIS: ICD-10-CM

## 2021-12-27 RX ORDER — DOXYCYCLINE 100 MG/1
100 CAPSULE ORAL 2 TIMES DAILY
Qty: 14 CAPSULE | Refills: 0 | Status: SHIPPED | OUTPATIENT
Start: 2021-12-27 | End: 2022-02-25 | Stop reason: ALTCHOICE

## 2021-12-30 ENCOUNTER — TELEPHONE (OUTPATIENT)
Dept: INTERNAL MEDICINE CLINIC | Age: 36
End: 2021-12-30

## 2021-12-30 NOTE — TELEPHONE ENCOUNTER
----- Message from Jamesinder Noble sent at 12/30/2021  9:53 AM EST -----  Subject: Message to Provider    QUESTIONS  Information for Provider? patient is scheduled for a CPE on 2/25 and would   like to get an appt to have lab work done a week prior to her visit  ---------------------------------------------------------------------------  --------------  [address]  What is the best way for the office to contact you? OK to leave message on   voicemail  Preferred Call Back Phone Number? [phone]  ---------------------------------------------------------------------------  --------------  SCRIPT ANSWERS  Relationship to Patient?  Self

## 2021-12-30 NOTE — TELEPHONE ENCOUNTER
Spoke with patient and updated that Dr. Aidee Woods will order labs at the time of the visit. Patient states understanding and grateful for the call.

## 2022-02-25 ENCOUNTER — OFFICE VISIT (OUTPATIENT)
Dept: INTERNAL MEDICINE CLINIC | Age: 37
End: 2022-02-25
Payer: COMMERCIAL

## 2022-02-25 VITALS
TEMPERATURE: 98.2 F | OXYGEN SATURATION: 100 % | DIASTOLIC BLOOD PRESSURE: 77 MMHG | RESPIRATION RATE: 16 BRPM | HEIGHT: 67 IN | BODY MASS INDEX: 22.98 KG/M2 | HEART RATE: 60 BPM | SYSTOLIC BLOOD PRESSURE: 122 MMHG | WEIGHT: 146.4 LBS

## 2022-02-25 DIAGNOSIS — Z00.00 PREVENTATIVE HEALTH CARE: Primary | ICD-10-CM

## 2022-02-25 DIAGNOSIS — Z11.59 NEED FOR HEPATITIS C SCREENING TEST: ICD-10-CM

## 2022-02-25 DIAGNOSIS — G43.809 OTHER MIGRAINE WITHOUT STATUS MIGRAINOSUS, NOT INTRACTABLE: ICD-10-CM

## 2022-02-25 DIAGNOSIS — F51.04 PSYCHOPHYSIOLOGICAL INSOMNIA: ICD-10-CM

## 2022-02-25 PROCEDURE — 99395 PREV VISIT EST AGE 18-39: CPT | Performed by: INTERNAL MEDICINE

## 2022-02-25 RX ORDER — TRIFAROTENE 50 UG/G
CREAM TOPICAL
COMMUNITY

## 2022-02-25 RX ORDER — ASCORBIC ACID 500 MG
TABLET ORAL
COMMUNITY

## 2022-02-25 RX ORDER — TRAZODONE HYDROCHLORIDE 50 MG/1
50 TABLET ORAL
Qty: 30 TABLET | Refills: 2 | Status: SHIPPED | OUTPATIENT
Start: 2022-02-25

## 2022-02-25 RX ORDER — FROVATRIPTAN SUCCINATE 2.5 MG/1
2.5 TABLET, FILM COATED ORAL
Qty: 18 TABLET | Refills: 5 | Status: SHIPPED | OUTPATIENT
Start: 2022-02-25

## 2022-02-25 RX ORDER — TRIAMCINOLONE ACETONIDE 55 UG/1
SPRAY, METERED NASAL
COMMUNITY
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
## 2022-02-26 LAB
ALBUMIN SERPL-MCNC: 4.5 G/DL (ref 3.5–5)
ALBUMIN/GLOB SERPL: 1.3 {RATIO} (ref 1.1–2.2)
ALP SERPL-CCNC: 59 U/L (ref 45–117)
ALT SERPL-CCNC: 29 U/L (ref 12–78)
ANION GAP SERPL CALC-SCNC: 5 MMOL/L (ref 5–15)
AST SERPL-CCNC: 21 U/L (ref 15–37)
BILIRUB SERPL-MCNC: 0.6 MG/DL (ref 0.2–1)
BUN SERPL-MCNC: 16 MG/DL (ref 6–20)
BUN/CREAT SERPL: 18 (ref 12–20)
CALCIUM SERPL-MCNC: 9.8 MG/DL (ref 8.5–10.1)
CHLORIDE SERPL-SCNC: 107 MMOL/L (ref 97–108)
CHOLEST SERPL-MCNC: 202 MG/DL
CO2 SERPL-SCNC: 26 MMOL/L (ref 21–32)
CREAT SERPL-MCNC: 0.91 MG/DL (ref 0.55–1.02)
ERYTHROCYTE [DISTWIDTH] IN BLOOD BY AUTOMATED COUNT: 12.7 % (ref 11.5–14.5)
GLOBULIN SER CALC-MCNC: 3.5 G/DL (ref 2–4)
GLUCOSE SERPL-MCNC: 82 MG/DL (ref 65–100)
HCT VFR BLD AUTO: 44.3 % (ref 35–47)
HCV AB SERPL QL IA: NONREACTIVE
HDLC SERPL-MCNC: 85 MG/DL
HDLC SERPL: 2.4 {RATIO} (ref 0–5)
HGB BLD-MCNC: 14.4 G/DL (ref 11.5–16)
LDLC SERPL CALC-MCNC: 106.4 MG/DL (ref 0–100)
MCH RBC QN AUTO: 30.6 PG (ref 26–34)
MCHC RBC AUTO-ENTMCNC: 32.5 G/DL (ref 30–36.5)
MCV RBC AUTO: 94.1 FL (ref 80–99)
NRBC # BLD: 0 K/UL (ref 0–0.01)
NRBC BLD-RTO: 0 PER 100 WBC
PLATELET # BLD AUTO: 180 K/UL (ref 150–400)
PMV BLD AUTO: 11.2 FL (ref 8.9–12.9)
POTASSIUM SERPL-SCNC: 3.6 MMOL/L (ref 3.5–5.1)
PROT SERPL-MCNC: 8 G/DL (ref 6.4–8.2)
RBC # BLD AUTO: 4.71 M/UL (ref 3.8–5.2)
SODIUM SERPL-SCNC: 138 MMOL/L (ref 136–145)
TRIGL SERPL-MCNC: 53 MG/DL (ref ?–150)
VLDLC SERPL CALC-MCNC: 10.6 MG/DL
WBC # BLD AUTO: 6 K/UL (ref 3.6–11)

## 2022-03-18 ENCOUNTER — PATIENT MESSAGE (OUTPATIENT)
Dept: OBGYN CLINIC | Age: 37
End: 2022-03-18

## 2022-03-21 ENCOUNTER — OFFICE VISIT (OUTPATIENT)
Dept: OBGYN CLINIC | Age: 37
End: 2022-03-21
Payer: COMMERCIAL

## 2022-03-21 VITALS
HEART RATE: 62 BPM | BODY MASS INDEX: 23.48 KG/M2 | HEIGHT: 67 IN | SYSTOLIC BLOOD PRESSURE: 115 MMHG | WEIGHT: 149.6 LBS | DIASTOLIC BLOOD PRESSURE: 73 MMHG

## 2022-03-21 DIAGNOSIS — Z01.419 ENCOUNTER FOR GYNECOLOGICAL EXAMINATION (GENERAL) (ROUTINE) WITHOUT ABNORMAL FINDINGS: Primary | ICD-10-CM

## 2022-03-21 PROCEDURE — 99395 PREV VISIT EST AGE 18-39: CPT | Performed by: OBSTETRICS & GYNECOLOGY

## 2022-03-21 RX ORDER — IBUPROFEN 400 MG/1
TABLET ORAL
COMMUNITY

## 2022-03-21 NOTE — PATIENT INSTRUCTIONS
Well Visit, Ages 25 to 48: Care Instructions  Overview     Well visits can help you stay healthy. Your doctor has checked your overall health and may have suggested ways to take good care of yourself. Your doctor also may have recommended tests. At home, you can help prevent illness with healthy eating, regular exercise, and other steps. Follow-up care is a key part of your treatment and safety. Be sure to make and go to all appointments, and call your doctor if you are having problems. It's also a good idea to know your test results and keep a list of the medicines you take. How can you care for yourself at home? · Get screening tests that you and your doctor decide on. Screening helps find diseases before any symptoms appear. · Eat healthy foods. Choose fruits, vegetables, whole grains, protein, and low-fat dairy foods. Limit fat, especially saturated fat. Reduce salt in your diet. · Limit alcohol. If you are a man, have no more than 2 drinks a day or 14 drinks a week. If you are a woman, have no more than 1 drink a day or 7 drinks a week. · Get at least 30 minutes of physical activity on most days of the week. Walking is a good choice. You also may want to do other activities, such as running, swimming, cycling, or playing tennis or team sports. Discuss any changes in your exercise program with your doctor. · Reach and stay at a healthy weight. This will lower your risk for many problems, such as obesity, diabetes, heart disease, and high blood pressure. · Do not smoke or allow others to smoke around you. If you need help quitting, talk to your doctor about stop-smoking programs and medicines. These can increase your chances of quitting for good. · Care for your mental health. It is easy to get weighed down by worry and stress. Learn strategies to manage stress, like deep breathing and mindfulness, and stay connected with your family and community.  If you find you often feel sad or hopeless, talk with your doctor. Treatment can help. · Talk to your doctor about whether you have any risk factors for sexually transmitted infections (STIs). You can help prevent STIs if you wait to have sex with a new partner (or partners) until you've each been tested for STIs. It also helps if you use condoms (male or female condoms) and if you limit your sex partners to one person who only has sex with you. Vaccines are available for some STIs, such as HPV. · Use birth control if it's important to you to prevent pregnancy. Talk with your doctor about the choices available and what might be best for you. · If you think you may have a problem with alcohol or drug use, talk to your doctor. This includes prescription medicines (such as amphetamines and opioids) and illegal drugs (such as cocaine and methamphetamine). Your doctor can help you figure out what type of treatment is best for you. · Protect your skin from too much sun. When you're outdoors from 10 a.m. to 4 p.m., stay in the shade or cover up with clothing and a hat with a wide brim. Wear sunglasses that block UV rays. Even when it's cloudy, put broad-spectrum sunscreen (SPF 30 or higher) on any exposed skin. · See a dentist one or two times a year for checkups and to have your teeth cleaned. · Wear a seat belt in the car. When should you call for help? Watch closely for changes in your health, and be sure to contact your doctor if you have any problems or symptoms that concern you. Where can you learn more? Go to http://www.[employer].com/  Enter P072 in the search box to learn more about \"Well Visit, Ages 25 to 48: Care Instructions. \"  Current as of: October 6, 2021               Content Version: 13.2  © 1862-3665 [employer], [employer]. Care instructions adapted under license by [employer] (which disclaims liability or warranty for this information).  If you have questions about a medical condition or this instruction, always ask your healthcare professional. [place] [address] any warranty or liability for your use of this information.

## 2022-03-21 NOTE — PROGRESS NOTES
[address] [place] [address] OB-GYN  [email or web address]  [phone]    Royal Madiha MD, FACOG       Annual Gynecologic Exam:  WWE <40  Chief Complaint   Patient presents with    Well Woman         Sergei Enriquez is a 29 y.o.  [address] [facility] female who presents for an annual well woman exam.  Patient's last menstrual period was 2022. Gloria Monroy She reports the following additional concerns: using ibuprofen protocol; it is helping,  has vasectomy. Menstrual status:  She does not report dysmenorrhea/painful menses. She does not report heavy menses. She does not report irregular bleeding. Sexual history and Contraception:  Social History     Substance and Sexual Activity   Sexual Activity Yes    Partners: Male    Birth control/protection: Surgical    Comment: Vasectomy       She does not reports new sexual partner(s) in the last year. Preventive Medicine History:  Her most recent Pap smear result: normal was obtained in 2021  Her most recent HR HPV screen was Negative obtained in     She does not have a history of ALAINA 2, 3 or cervical cancer. Past Medical History:   Diagnosis Date    Acne     Anemia     Contusion of left knee 2013    Eczema 2013    Encounter for supervision of other normal pregnancy, unspecified trimester 3/5/2018    Declined genetic testing GERD zantac Nausea [facility], post, no longer previa <2cm, fu 34 wks (rafael cole) 18 1 hr gtt #136; 3 hr gtt 18 WNL-80, 131, 124, 78 Hgb 10.5 @ 29wks neg k-b 18 MFM appt 18 @ 8:45am for elevated BP/anxiety--pt notified in office. Induction 10/3/18. Pt notified.  GERD (gastroesophageal reflux disease) 2015    Migraine 2013    no aura, worse on cycle,     Right knee meniscal tear     resolved w physical therapy.   BBall injury    Right wrist fracture , 2000    x2    Scoliosis     mild    Screening for cervical cancer 2010, 3/8/2012; 3/17/21    normal. Dr. De La Fuente [place]; Neg pap and neg HPV     OB History    Para Term  AB Living   2 2 2 0 0 2   SAB IAB Ectopic Molar Multiple Live Births   0 0 0   0 2      # Outcome Date GA Lbr Faraz/2nd Weight Sex Delivery Anes PTL Lv   2 Term 10/01/18 40w1d 02:35 / 00:15 8 lb 2.5 oz (3.7 kg) F Vag-Spont EPIDURAL AN  [place]   1 Term 06/24/15 40w5d   F Vag-Spont   [place]      Obstetric Comments   PPH      No past surgical history on file. 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   Tobacco Use    Smoking status: Never Smoker    Smokeless tobacco: Never Used   Vaping Use    Vaping Use: Never used   Substance and Sexual Activity    Alcohol use: Yes     Comment: soc    Drug use: No    Sexual activity: Yes     Partners: Male     Birth control/protection: Surgical     Comment: Vasectomy   Other Topics Concern    Not on file   Social History Narrative    Not on file     Social Determinants of Health     Financial Resource Strain:     Difficulty of Paying Living Expenses: Not on file   Food Insecurity:     Worried About Running Out of Food in the Last Year: Not on file    Mayi of Food in the Last Year: Not on file   Transportation Needs:     Lack of Transportation (Medical): Not on file    Lack of Transportation (Non-Medical):  Not on file   Physical Activity:     Days of Exercise per Week: Not on file    Minutes of Exercise per Session: Not on file   Stress:     Feeling of Stress : Not on file   Social Connections:     Frequency of Communication with Friends and Family: Not on file    Frequency of Social Gatherings with Friends and Family: Not on file    Attends Anabaptist Services: Not on file    Active Member of Clubs or Organizations: Not on file    Attends Club or Organization Meetings: Not on file    Marital Status: Not on file   Intimate Partner Violence:     Fear of Current or Ex-Partner: Not on file    Emotionally Abused: Not on file    Physically Abused: Not on file    Sexually Abused: Not on file   Housing Stability:     Unable to Pay for Housing in the Last Year: Not on file    Number of [place] in the Last Year: Not on file    Unstable Housing in the Last Year: Not on file       Allergies   Allergen Reactions    Bactrim [Sulfamethoprim] Hives    Gardasil [H Papillomavirus Vac,Qval (Pf)] Other (comments)     syncope       Current Outpatient Medications   Medication Sig    ibuprofen (MOTRIN) 400 mg tablet Take  by mouth every six (6) hours as needed for Pain.  Ca/D3/mag ox/zinc//arnoldo/bor (CALCIUM 600-D3 PLUS, MAG-ZINC, PO) Take  by mouth.  ascorbic acid, vitamin C, (Vitamin C) 500 mg tablet Take  by mouth.  fexofenadine HCl (ALLEGRA PO) Take  by mouth daily.  triamcinolone (Nasacort) 55 mcg nasal inhaler Nasacort 55 mcg nasal spray aerosol   Spray 2 sprays every day by intranasal route.  trifarotene (Aklief) 0.005 % crea by Apply Externally route nightly.  traZODone (DESYREL) 50 mg tablet Take 1 Tablet by mouth nightly.  frovatriptan (Frova) 2.5 mg tablet Take 1 Tablet by mouth daily as needed for Migraine. Indications: a migraine headache    albuterol (PROVENTIL HFA, VENTOLIN HFA, PROAIR HFA) 90 mcg/actuation inhaler Take 2 Puffs by inhalation every four (4) hours as needed for Wheezing.  OTHER Collagen gummies PO    multivitamin (ONE A DAY) tablet Take 1 Tab by mouth daily. No current facility-administered medications for this visit. Patient Active Problem List   Diagnosis Code    Acne L70.9    Migraine G43.909    Eczema L30.9    Scoliosis M41.9         Review of Systems - History obtained from the patient and patient filled out questionnaire   Constitutional/general, HEENT, CV, Resp, GI, MSK, Neuro, Psych, Heme/lymph, Skin, Breast ROS: no significant complaints except as noted on HPI    Physical Exam  Visit Vitals  /73   Pulse 62   Ht 5' 7\" (1.702 m)   Wt 149 lb 9.6 oz (67.9 kg)   LMP 03/08/2022   Breastfeeding No   BMI 23.43 kg/m²       Constitutional  · Appearance: well-nourished, well developed, alert, in no acute distress    HENT  · Head and Face: appears normal    Neck  · Inspection/Palpation: normal appearance, no masses or tenderness  · Lymph Nodes: no lymphadenopathy present  · Thyroid: gland size normal, nontender, no nodules or masses present on palpation    Chest  · Respiratory Effort: breathing unlabored  · Auscultation: normal breath sounds    Cardiovascular  · Heart:  · Auscultation: regular rate and rhythm without murmur    Breasts  · Inspection of Breasts: breasts symmetrical, no skin changes, no discharge present, nipple appearance normal, no skin retraction present  · Palpation of Breasts and Axillae: no masses present on palpation, no breast tenderness  · Axillary Lymph Nodes: no lymphadenopathy present    Gastrointestinal  · Abdominal Examination: abdomen non-tender to palpation, normal bowel sounds, no masses present  · Liver and spleen: no hepatomegaly present, spleen not palpable  · Hernias: no hernias identified    Genitourinary  · External Genitalia: normal appearance for age, no discharge present, no tenderness present, no inflammatory lesions present, no masses present  · Vagina: normal vaginal vault without central or paravaginal defects, minimal white discharge present, no inflammatory lesions present, no masses present  · Bladder: non-tender to palpation  · Urethra: appears normal  · Cervix: normal   · Uterus: normal size, shape and consistency  · Adnexa: no adnexal tenderness present, no adnexal masses present  · Perineum: perineum within normal limits, no evidence of trauma, no rashes or skin lesions present  · Anus: anus within normal limits, no hemorrhoids present  · Inguinal Lymph Nodes: no lymphadenopathy present    Skin  · General Inspection: no rash, no lesions identified    Neurologic/Psychiatric  · Mental Status:  · Orientation: grossly oriented to person, place and time  · Mood and Affect: mood normal, affect appropriate    Assessment:  29 y.o.  for well woman exam  No diagnosis found. Plan:  The patient was counseled about diet, exercise, healthy lifestyle  We discussed current pap smear and HR HPV testing guidelines. I recommended follow up one year for routine annual gynecologic exam or sooner prn  Handouts were given to the patient  I recommended follow up with a primary care physician for chronic medical problems and evaluation of non-gynecologic concerns and to please contact our office with any GYN questions or concerns. I recommended testing per CDC guidelines and at patient request.   We discussed safer NSAID dosing for heavy cycles. Pt was advised to take this dose with food and not to take for more than 5 days. Disc RBA including bleeding/gastric irritation. MACKENZIE HENDRIX if NI to discuss other options. We discussed typical perimenopausal bleeding patterns and symptoms. I recommend that she notify MD for chaotic or symptomatic bleeding, or bleeding in between menses or intermenstrual bleeding for additional evaluation. She can also schedule a consult to discuss management of symptoms of perimenopause that are concerning her or interfering with her life or day to day function or activity.    Plan nsaid/observations for cycles, declines hormonal options    Folllow up:  [x] return for annual well woman exam in one year or sooner if she is having problems  [] follow up and ultrasound  [] 6 months  [] 3 months  [] 6 weeks   [] 1 month    No orders of the defined types were placed in this encounter. No results found for any visits on 03/21/22.

## 2022-05-09 ENCOUNTER — PATIENT MESSAGE (OUTPATIENT)
Dept: INTERNAL MEDICINE CLINIC | Age: 37
End: 2022-05-09

## 2022-05-09 DIAGNOSIS — M25.559 PAIN IN HIP: Primary | ICD-10-CM

## 2022-05-09 NOTE — TELEPHONE ENCOUNTER
From: Sammie Arce  To: Nivia Davis MD  Sent: 5/9/2022 10:18 AM EDT  Subject: ortho referral    Hi Dr. Tiera Lai,    I was just wondering if you could make me an ortho referral to [facility]. Our insurance requires it. I had my sister look at my hip the other day as I have been having consistent pain with walking the last few weeks and was not sure if I damaged something. She said it seems like it is either impingement or a labral strain/tear. I wanted to see what my options were for this so I can get back to my normal activities. Thanks!     Brandi Garcia

## 2022-06-06 ENCOUNTER — E-VISIT (OUTPATIENT)
Dept: INTERNAL MEDICINE CLINIC | Age: 37
End: 2022-06-06
Payer: COMMERCIAL

## 2022-06-06 DIAGNOSIS — J01.90 ACUTE NON-RECURRENT SINUSITIS, UNSPECIFIED LOCATION: Primary | ICD-10-CM

## 2022-06-06 PROCEDURE — 99421 OL DIG E/M SVC 5-10 MIN: CPT | Performed by: INTERNAL MEDICINE

## 2022-06-06 RX ORDER — AMOXICILLIN AND CLAVULANATE POTASSIUM 875; 125 MG/1; MG/1
1 TABLET, FILM COATED ORAL 2 TIMES DAILY
Qty: 14 TABLET | Refills: 0 | Status: SHIPPED | OUTPATIENT
Start: 2022-06-06 | End: 2022-06-13

## 2022-06-06 NOTE — PROGRESS NOTES
Patient with a 7-day history of sinusitis symptoms. Daughter with similar symptoms and ear infection. Flonase and will send in Augmentin.

## 2022-07-19 RX ORDER — TRIFAROTENE 50 UG/G
CREAM TOPICAL
Qty: 45 | Refills: 6 | Status: ERX

## 2022-11-07 RX ORDER — AMOXICILLIN AND CLAVULANATE POTASSIUM 875; 125 MG/1; MG/1
1 TABLET, FILM COATED ORAL 2 TIMES DAILY
Qty: 14 TABLET | Refills: 0 | Status: SHIPPED | OUTPATIENT
Start: 2022-11-07 | End: 2022-11-14

## 2023-03-12 NOTE — PROCEDURE: MEDICATION COUNSELING
WBC 11, lactate 2.2, other labs wnl    Imaging:    IMPRESSION:    CT HEAD:  Calvarial fractures involving the right sphenoid bone with probable diastases of the right zygomaticotemporal suture.    Tiny focus of extra-axial blood products subjacent to the fracture sites measuring 2 to 3 mm in axial thickness. Short interval follow-up CT recommended to assess stability.    CT CERVICAL SPINE:  No cervical spine fracture or traumatic malalignment.    FINDINGS:  CHEST:  LUNGS AND LARGE AIRWAYS: Patent central airways. Bibasilar subsegmental atelectasis with scattered areas of groundglass attenuation.  PLEURA: No pleural effusion.  VESSELS: Minimal atherosclerotic calcifications of the aorta.  HEART: Heart size is normal. No pericardial effusion.  MEDIASTINUM AND SUSAN: No lymphadenopathy.  CHEST WALL AND LOWER NECK: 4 mm right thyroid nodule.    ABDOMEN AND PELVIS:  LIVER: Within normal limits.  BILE DUCTS: Normal caliber.  GALLBLADDER: Within normal limits.  SPLEEN: Within normal limits.  PANCREAS: Within normal limits.  ADRENALS: Within normal limits.  KIDNEYS/URETERS: Left renal cyst. No hydronephrosis.    BLADDER: Within normal limits.  REPRODUCTIVE ORGANS: Fibroid uterus with scattered foci of myometrial fat.    BOWEL: No bowel obstruction. Appendix is normal.  PERITONEUM: No ascites.  VESSELS: Within normal limits.  RETROPERITONEUM/LYMPH NODES: No lymphadenopathy.  ABDOMINAL WALL: Subcutaneous hematoma measuring up to 9.4 cm involving the right gluteal region.  BONES: Acute minimally displaced right lateral 4th-6th rib fractures.    IMPRESSION:  Acute minimally displaced right lateral fourth through sixth rib fractures.    Right gluteal subcutaneous hematoma. Rituxan Counseling:  I discussed with the patient the risks of Rituxan infusions. Side effects can include infusion reactions, severe drug rashes including mucocutaneous reactions, reactivation of latent hepatitis and other infections and rarely progressive multifocal leukoencephalopathy.  All of the patient's questions and concerns were addressed.

## 2023-04-24 ENCOUNTER — TELEPHONE (OUTPATIENT)
Dept: INTERNAL MEDICINE CLINIC | Age: 38
End: 2023-04-24

## 2023-04-24 NOTE — TELEPHONE ENCOUNTER
I called pt back, no answer. LM on VM that pcp orders lab work at the time of the visit. If pt needs to have fasting labs done she can visit the lab another day when fasting.  If she has any questions / concerns regarding this please call back

## 2023-04-24 NOTE — TELEPHONE ENCOUNTER
----- Message from Priscella Lesches sent at 4/21/2023  3:19 PM EDT -----  Subject: Referral Request    Reason for referral request? Patient called on 04/21 @ 3:18 pm. She is   requesting to have blood work with her yearly physical and will need   orders activated for bloodwork.   Provider patient wants to be referred to(if known): Jose Tao    Provider Phone Number(if known):[phone]    Additional Information for Provider?   ---------------------------------------------------------------------------  --------------  [address]    [phone]; OK to respond with electronic message via [employer] portal (only   for patients who have registered [employer] account)  ---------------------------------------------------------------------------  --------------

## 2023-07-05 ENCOUNTER — OFFICE VISIT (OUTPATIENT)
Age: 38
End: 2023-07-05
Payer: COMMERCIAL

## 2023-07-05 VITALS
RESPIRATION RATE: 19 BRPM | TEMPERATURE: 98.1 F | OXYGEN SATURATION: 100 % | HEIGHT: 67 IN | SYSTOLIC BLOOD PRESSURE: 126 MMHG | WEIGHT: 147 LBS | HEART RATE: 65 BPM | BODY MASS INDEX: 23.07 KG/M2 | DIASTOLIC BLOOD PRESSURE: 79 MMHG

## 2023-07-05 DIAGNOSIS — Z00.00 PREVENTATIVE HEALTH CARE: ICD-10-CM

## 2023-07-05 DIAGNOSIS — M70.41 PREPATELLAR BURSITIS OF RIGHT KNEE: Primary | ICD-10-CM

## 2023-07-05 DIAGNOSIS — F41.9 ANXIETY: ICD-10-CM

## 2023-07-05 DIAGNOSIS — R05.8 POST-VIRAL COUGH SYNDROME: ICD-10-CM

## 2023-07-05 DIAGNOSIS — F51.04 PSYCHOPHYSIOLOGIC INSOMNIA: ICD-10-CM

## 2023-07-05 DIAGNOSIS — G43.009 MIGRAINE WITHOUT AURA AND WITHOUT STATUS MIGRAINOSUS, NOT INTRACTABLE: ICD-10-CM

## 2023-07-05 PROBLEM — M76.891 TENDINITIS OF RIGHT HIP: Status: ACTIVE | Noted: 2022-06-17

## 2023-07-05 PROBLEM — H52.13 MYOPIA OF BOTH EYES: Status: ACTIVE | Noted: 2021-10-13

## 2023-07-05 PROCEDURE — 99214 OFFICE O/P EST MOD 30 MIN: CPT | Performed by: INTERNAL MEDICINE

## 2023-07-05 RX ORDER — BUDESONIDE AND FORMOTEROL FUMARATE DIHYDRATE 160; 4.5 UG/1; UG/1
2 AEROSOL RESPIRATORY (INHALATION) 2 TIMES DAILY
Qty: 10.2 G | Refills: 0 | Status: SHIPPED | OUTPATIENT
Start: 2023-07-05

## 2023-07-05 RX ORDER — FLUOXETINE 10 MG/1
10 CAPSULE ORAL DAILY
Qty: 30 CAPSULE | Refills: 3 | Status: SHIPPED | OUTPATIENT
Start: 2023-07-05

## 2023-07-05 RX ORDER — AZITHROMYCIN 250 MG/1
250 TABLET, FILM COATED ORAL SEE ADMIN INSTRUCTIONS
Qty: 6 TABLET | Refills: 0 | Status: SHIPPED | OUTPATIENT
Start: 2023-07-05 | End: 2023-07-10

## 2023-07-05 RX ORDER — SUMATRIPTAN 50 MG/1
50 TABLET, FILM COATED ORAL DAILY PRN
Qty: 9 TABLET | Refills: 5 | Status: SHIPPED | OUTPATIENT
Start: 2023-07-05

## 2023-07-05 SDOH — ECONOMIC STABILITY: HOUSING INSECURITY
IN THE LAST 12 MONTHS, WAS THERE A TIME WHEN YOU DID NOT HAVE A STEADY PLACE TO SLEEP OR SLEPT IN A SHELTER (INCLUDING NOW)?: NO

## 2023-07-05 SDOH — ECONOMIC STABILITY: FOOD INSECURITY: WITHIN THE PAST 12 MONTHS, YOU WORRIED THAT YOUR FOOD WOULD RUN OUT BEFORE YOU GOT MONEY TO BUY MORE.: NEVER TRUE

## 2023-07-05 SDOH — ECONOMIC STABILITY: FOOD INSECURITY: WITHIN THE PAST 12 MONTHS, THE FOOD YOU BOUGHT JUST DIDN'T LAST AND YOU DIDN'T HAVE MONEY TO GET MORE.: NEVER TRUE

## 2023-07-05 SDOH — ECONOMIC STABILITY: INCOME INSECURITY: HOW HARD IS IT FOR YOU TO PAY FOR THE VERY BASICS LIKE FOOD, HOUSING, MEDICAL CARE, AND HEATING?: NOT HARD AT ALL

## 2023-07-05 ASSESSMENT — PATIENT HEALTH QUESTIONNAIRE - PHQ9
SUM OF ALL RESPONSES TO PHQ QUESTIONS 1-9: 0
SUM OF ALL RESPONSES TO PHQ QUESTIONS 1-9: 0
2. FEELING DOWN, DEPRESSED OR HOPELESS: 0
SUM OF ALL RESPONSES TO PHQ QUESTIONS 1-9: 0
SUM OF ALL RESPONSES TO PHQ9 QUESTIONS 1 & 2: 0
SUM OF ALL RESPONSES TO PHQ QUESTIONS 1-9: 0
1. LITTLE INTEREST OR PLEASURE IN DOING THINGS: 0

## 2023-07-06 LAB
ALBUMIN SERPL-MCNC: 4.4 G/DL (ref 3.5–5)
ALBUMIN/GLOB SERPL: 1.3 (ref 1.1–2.2)
ALP SERPL-CCNC: 50 U/L (ref 45–117)
ALT SERPL-CCNC: 24 U/L (ref 12–78)
ANION GAP SERPL CALC-SCNC: 3 MMOL/L (ref 5–15)
AST SERPL-CCNC: 24 U/L (ref 15–37)
BILIRUB SERPL-MCNC: 0.7 MG/DL (ref 0.2–1)
BUN SERPL-MCNC: 16 MG/DL (ref 6–20)
BUN/CREAT SERPL: 20 (ref 12–20)
CALCIUM SERPL-MCNC: 10.1 MG/DL (ref 8.5–10.1)
CHLORIDE SERPL-SCNC: 107 MMOL/L (ref 97–108)
CHOLEST SERPL-MCNC: 205 MG/DL
CO2 SERPL-SCNC: 29 MMOL/L (ref 21–32)
CREAT SERPL-MCNC: 0.8 MG/DL (ref 0.55–1.02)
ERYTHROCYTE [DISTWIDTH] IN BLOOD BY AUTOMATED COUNT: 12.4 % (ref 11.5–14.5)
GLOBULIN SER CALC-MCNC: 3.3 G/DL (ref 2–4)
GLUCOSE SERPL-MCNC: 91 MG/DL (ref 65–100)
HCT VFR BLD AUTO: 43.6 % (ref 35–47)
HDLC SERPL-MCNC: 82 MG/DL
HDLC SERPL: 2.5 (ref 0–5)
HGB BLD-MCNC: 14.2 G/DL (ref 11.5–16)
LDLC SERPL CALC-MCNC: 111 MG/DL (ref 0–100)
MCH RBC QN AUTO: 30.3 PG (ref 26–34)
MCHC RBC AUTO-ENTMCNC: 32.6 G/DL (ref 30–36.5)
MCV RBC AUTO: 93.2 FL (ref 80–99)
NRBC # BLD: 0 K/UL (ref 0–0.01)
NRBC BLD-RTO: 0 PER 100 WBC
PLATELET # BLD AUTO: 166 K/UL (ref 150–400)
PMV BLD AUTO: 11 FL (ref 8.9–12.9)
POTASSIUM SERPL-SCNC: 3.8 MMOL/L (ref 3.5–5.1)
PROT SERPL-MCNC: 7.7 G/DL (ref 6.4–8.2)
RBC # BLD AUTO: 4.68 M/UL (ref 3.8–5.2)
SODIUM SERPL-SCNC: 139 MMOL/L (ref 136–145)
TRIGL SERPL-MCNC: 60 MG/DL
VLDLC SERPL CALC-MCNC: 12 MG/DL
WBC # BLD AUTO: 4.8 K/UL (ref 3.6–11)

## 2023-08-25 NOTE — MR AVS SNAPSHOT
[address] [facility] [address] [place] [address] 
[phone] Patient: Tito Rangel MRN: [id number] Visit Information Date & Time Provider Department Dept. Phone Encounter #  
 8/8/2018 11:00 AM MD Brodie Stewart [phone] [id number] Your Appointments 8/21/2018  9:45 AM  
ULTRASOUND with Oletha Cabot, ROG [place] ([facility] Appt Note: 2wk fob with u/s 34wk   TP  
 [address] [place] [address] Road [address] [place] [address]  
[phone]  
  
   
 [address] [place] [address]  
  
    
  
 8/21/2018 10:30 AM  
OB VISIT with MD Brodie Stewart ([facility]) Appt Note: 2wk fob with u/s 34wk   TP  
 [address] [place] [address] [address] [place] [address]  
[phone]  
  
   
 [address] [place], [place] [address] [place] [address]  
  
    
 9/4/2018  9:50 AM  
OB VISIT with MD Brodie Stewart ([facility]) Appt Note: 2wk fob  36wk  GBS   TP  
 [address] [place] [address] [address] [place] [address]  
[phone] Upcoming Health Maintenance Date Due Influenza Age 5 to Adult 8/1/2018 PAP AKA CERVICAL CYTOLOGY 2/21/2021 Allergies as of 8/8/2018  Review Complete On: 8/8/2018 By: Rosalie Worley LPN Severity Noted Reaction Type Reactions Bactrim [Sulfamethoprim]  03/24/2018    Hives Ceftin [Cefuroxime Axetil]  11/30/2010    Hives Gardasil [H Papillomavirus Vac,qval (Pf)]  09/07/2012    Other (comments)  
 syncope Current Immunizations  Reviewed on 10/16/2017 Name Date Hep A Vaccine (Adult) 1/30/2014 Human Papillomavirus 1/1/2006 Influenza Vaccine 8/19/2017, 9/16/2014, 8/24/2013 TDAP Vaccine 9/7/2012 Tdap 7/25/2018, 4/16/2015 Not reviewed this visit Vitals BP Height(growth percentile) Weight(growth percentile) LMP BMI OB Status 118/70 5' 7\" (1.702 m) 172 lb (78 kg) 12/24/2017 (Exact Date) 26.94 kg/m2 Pregnant Smoking Status Never Smoker BMI and BSA Data Body Mass Index Body Surface Area  
 26.94 kg/m 2 1.92 m 2 Preferred Pharmacy Pharmacy Name Phone [facility] [address]  [facility] [phone] Your Updated Medication List  
  
   
This list is accurate as of 8/8/18 11:18 AM.  Always use your most recent med list.  
  
  
  
  
 butalbital-acetaminophen-caffeine -40 mg per tablet Commonly known as:  Corin Hugo Take 1 Tab by mouth every four (4) hours as needed for Pain.  
  
 hydrocortisone 25 mg Supp Commonly known as:  ANUCORT-HC Insert 1 Suppository into rectum every twelve (12) hours for 14 days. PROCTOZONE-HC 2.5 % rectal cream  
Generic drug:  hydrocortisone APPLY RECTALLY Q 12 H  
  
 SLOW RELEASE IRON PO Take  by mouth. ZANTAC 75 75 mg tablet Generic drug:  raNITIdine Take 75 mg by mouth two (2) times a day. Patient Instructions Weeks 32 to 34 of Your Pregnancy: Care Instructions Your Care Instructions During the last few weeks of your pregnancy, you may have more aches and pains. It's important to rest when you can. Your growing baby is putting more pressure on your bladder. So you may need to urinate more often. Hemorrhoids are also common. These are painful, itchy veins in the rectal area. In the 36th week, most women have a test for group B streptococcus (GBS). GBS is a common bacteria that can live in the vagina and rectum. It can make your baby sick after birth. If you test positive, you will get antibiotics during labor. These will keep your baby from getting the bacteria.  
You may want to talk with your doctor about banking your baby's umbilical cord blood. This is the blood left in the cord after birth. If you want to save this blood, you must arrange it ahead of time. You can't decide at the last minute. If you haven't already had the Tdap shot during this pregnancy, talk to your doctor about getting it. It will help protect your  against pertussis infection. Follow-up care is a key part of your treatment and safety. Be sure to make and go to all appointments, and call your doctor if you are having problems. It's also a good idea to know your test results and keep a list of the medicines you take. How can you care for yourself at home? Ease hemorrhoids · Get more liquids, fruits, vegetables, and fiber in your diet. This will help keep your stools soft. · Avoid sitting for too long. Lie on your left side several times a day. · Clean yourself with soft, moist toilet paper. Or you can use witch hazel pads or personal hygiene pads. · If you are uncomfortable, try ice packs. Or you can sit in a warm sitz bath. Do these for 20 minutes at a time, as needed. · Use hydrocortisone cream for pain and itching. Two examples are Anusol and Preparation H Hydrocortisone. · Ask your doctor about taking an over-the-counter stool softener. Consider breastfeeding · Experts recommend that women breastfeed for 1 year or longer. Breast milk is the perfect food for babies. · Breast milk is easier for babies to digest than formula. And it is always available, just the right temperature, and free. · Breast milk may help protect your child from some health problems.  babies are less likely than formula-fed babies to: ¨ Get ear infections, colds, diarrhea, and pneumonia. ¨ Be obese or get diabetes later in life. · Women who breastfeed have less bleeding after the birth. Their uteruses also shrink back faster. · Some women who breastfeed lose weight faster. Making milk burns calories. · Breastfeeding can lower your risk of breast cancer, ovarian cancer, and osteoporosis. Decide about circumcision for boys · As you make this decision, it may help to think about your personal, Nondenominational, and family traditions. You get to decide if you will keep your son's penis natural or if he will be circumcised. · If you decide that you would like to have your baby circumcised, talk with your doctor. You can share your concerns about pain. And you can discuss your preferences for anesthesia. Where can you learn more? Go to http://remi-kannan.info/. Enter A345 in the search box to learn more about \"Weeks 32 to 34 of Your Pregnancy: Care Instructions. \" Current as of: November 21, 2017 Content Version: 11.7 © 8538-0844 [employer]. Care instructions adapted under license by [employer] (which disclaims liability or warranty for this information). If you have questions about a medical condition or this instruction, always ask your healthcare professional. [place] [address] any warranty or liability for your use of this information. Introducing [facility]! Dear Marilynn Flores: Thank you for requesting a Ne[employer]ar account. Our records indicate that you already have an active Neolinear account. You can access your account anytime at [email or web address] Did you know that you can access your hospital and ER discharge instructions at any time in Neolinear? You can also review all of your test results from your hospital stay or ER visit. Additional Information If you have questions, please visit the Frequently Asked Questions section of the Ne[employer]ar website at [email or web address] Remember, Neolinear is NOT to be used for urgent needs. For medical emergencies, dial 911. Now available from your iPhone and Android! Please provide this summary of care documentation to your next provider. Your primary care clinician is listed as Destiny Guy. If you have any questions after today's visit, please call [phone]. 48

## 2023-12-05 DIAGNOSIS — F41.9 ANXIETY: ICD-10-CM

## 2023-12-05 DIAGNOSIS — R05.8 POST-VIRAL COUGH SYNDROME: ICD-10-CM

## 2023-12-05 RX ORDER — FLUOXETINE 10 MG/1
10 CAPSULE ORAL DAILY
Qty: 30 CAPSULE | Refills: 5 | Status: SHIPPED | OUTPATIENT
Start: 2023-12-05

## 2023-12-05 RX ORDER — BUDESONIDE AND FORMOTEROL FUMARATE DIHYDRATE 160; 4.5 UG/1; UG/1
2 AEROSOL RESPIRATORY (INHALATION) 2 TIMES DAILY
Qty: 10.2 G | Refills: 5 | Status: SHIPPED | OUTPATIENT
Start: 2023-12-05

## 2024-06-17 ENCOUNTER — OFFICE VISIT (OUTPATIENT)
Age: 39
End: 2024-06-17
Payer: COMMERCIAL

## 2024-06-17 VITALS
SYSTOLIC BLOOD PRESSURE: 127 MMHG | HEART RATE: 62 BPM | BODY MASS INDEX: 23.2 KG/M2 | DIASTOLIC BLOOD PRESSURE: 70 MMHG | HEIGHT: 67 IN | WEIGHT: 147.8 LBS

## 2024-06-17 DIAGNOSIS — Z12.4 CERVICAL CANCER SCREENING: ICD-10-CM

## 2024-06-17 DIAGNOSIS — Z11.51 SCREENING FOR HPV (HUMAN PAPILLOMAVIRUS): ICD-10-CM

## 2024-06-17 DIAGNOSIS — Z01.419 ENCOUNTER FOR GYNECOLOGICAL EXAMINATION: Primary | ICD-10-CM

## 2024-06-17 PROCEDURE — 99459 PELVIC EXAMINATION: CPT | Performed by: OBSTETRICS & GYNECOLOGY

## 2024-06-17 PROCEDURE — 99395 PREV VISIT EST AGE 18-39: CPT | Performed by: OBSTETRICS & GYNECOLOGY

## 2024-06-17 NOTE — PROGRESS NOTES
Layla Page is a 39 y.o. female returns for an annual exam     Chief Complaint   Patient presents with    Annual Exam       Patient's last menstrual period was 05/27/2024.  Her periods are moderate/heavy then light in flow and usually regular with a 26-32 day interval with 3-7 day duration.  She has dysmenorrhea sometimes.   Problems: pt reports using ibuprofen protocol on first day or two of her cycle with improvement.  Pt reports intermittently throughout the year she has noticed bilateral substance on her nipples that seems like it could brush away, no liquid she can see. Notices it after working out sometimes, wondering if it could be riley gland with trapped sweat.   Birth Control: vasectomy.  Last Pap: 03/17/2021 WNL  She does not have a history of LAM 2, 3 or cervical cancer.     Examination chaperoned by Guerda Munson MA.  
MSK, Neuro, Psych, Heme/lymph, Skin, Breast ROS: no significant complaints except as noted on HPI    Physical Exam  /70   Pulse 62   Ht 1.702 m (5' 7\")   Wt 67 kg (147 lb 12.8 oz)   LMP 05/27/2024   BMI 23.15 kg/m²     Constitutional  Appearance: alert, in no acute distress    HENT  Head and Face: appears normal    Neck  Inspection/Palpation: normal appearance    Breasts  Inspection of Breasts: breasts symmetrical, no skin changes, no discharge present, nipple appearance normal, no skin retraction present  Palpation of Breasts and Axillae: no masses present on palpation, no breast tenderness  Axillary Lymph Nodes: no lymphadenopathy present    Gastrointestinal  Abdominal Examination: abdomen non-tender to palpation, no masses present    Genitourinary  External Genitalia: normal appearance for age  Vagina: normal vaginal vault, minimal discharge present  Bladder: non-tender to palpation  Urethra: appears normal  Cervix: normal, non tender   Uterus: normal, non tender  Adnexa: no adnexal tenderness present, no adnexal masses present  Perineum: normal appearing  Anus: normal appearing    Skin  General Inspection: no significant rash    Neurologic/Psychiatric  Mental Status:  Orientation: grossly oriented and alert  Mood and Affect: mood normal, affect appropriate    Assessment:  39 y.o. No obstetric history on file. for annual gynecologic exam.  Encounter Diagnoses   Name Primary?    Encounter for gynecological examination Yes    Cervical cancer screening     Screening for HPV (human papillomavirus)        Plan:  Recommend follow up one year for routine annual gynecologic exam or sooner as needed for any GYN concerns.  Patient should follow up with a primary care physician for chronic medical problems and evaluation of non-gynecologic concerns.  STD testing recommended per CDC guidelines and at patient request.   Follow up: annual gynecologic exam one year.  Will monitor nipple changes, suspect benign sebum:

## 2024-06-25 LAB
CYTOLOGIST CVX/VAG CYTO: ABNORMAL
CYTOLOGY CVX/VAG DOC CYTO: ABNORMAL
CYTOLOGY CVX/VAG DOC THIN PREP: ABNORMAL
DX ICD CODE: ABNORMAL
DX ICD CODE: ABNORMAL
HPV GENOTYPE REFLEX: ABNORMAL
HPV I/H RISK 4 DNA CVX QL PROBE+SIG AMP: NEGATIVE
Lab: ABNORMAL
OTHER STN SPEC: ABNORMAL
PATHOLOGIST CVX/VAG CYTO: ABNORMAL
STAT OF ADQ CVX/VAG CYTO-IMP: ABNORMAL

## 2024-07-08 RX ORDER — VILAZODONE HYDROCHLORIDE 10 MG/1
10 TABLET ORAL DAILY
Qty: 30 TABLET | Refills: 0 | Status: SHIPPED | OUTPATIENT
Start: 2024-07-08

## 2024-07-12 ENCOUNTER — TELEPHONE (OUTPATIENT)
Age: 39
End: 2024-07-12

## 2024-07-12 ENCOUNTER — OFFICE VISIT (OUTPATIENT)
Age: 39
End: 2024-07-12

## 2024-07-12 ENCOUNTER — TELEMEDICINE (OUTPATIENT)
Age: 39
End: 2024-07-12
Payer: COMMERCIAL

## 2024-07-12 VITALS — BODY MASS INDEX: 23.34 KG/M2 | WEIGHT: 149 LBS | DIASTOLIC BLOOD PRESSURE: 86 MMHG | SYSTOLIC BLOOD PRESSURE: 146 MMHG

## 2024-07-12 DIAGNOSIS — F41.9 ANXIETY: Primary | ICD-10-CM

## 2024-07-12 DIAGNOSIS — R30.0 DYSURIA: Primary | ICD-10-CM

## 2024-07-12 DIAGNOSIS — R10.2 PELVIC PRESSURE IN FEMALE: ICD-10-CM

## 2024-07-12 LAB
BILIRUBIN, URINE, POC: NEGATIVE
BLOOD URINE, POC: NEGATIVE
GLUCOSE URINE, POC: NEGATIVE
KETONES, URINE, POC: NEGATIVE
LEUKOCYTE ESTERASE, URINE, POC: NEGATIVE
NITRITE, URINE, POC: POSITIVE
PH, URINE, POC: 7 (ref 4.6–8)
PROTEIN,URINE, POC: NEGATIVE
SPECIFIC GRAVITY, URINE, POC: 1.01 (ref 1–1.03)
URINALYSIS CLARITY, POC: CLEAR
URINALYSIS COLOR, POC: YELLOW
UROBILINOGEN, POC: ABNORMAL

## 2024-07-12 PROCEDURE — 99213 OFFICE O/P EST LOW 20 MIN: CPT | Performed by: INTERNAL MEDICINE

## 2024-07-12 RX ORDER — ALPRAZOLAM 0.25 MG/1
0.25 TABLET ORAL 3 TIMES DAILY PRN
Qty: 30 TABLET | Refills: 0 | Status: SHIPPED | OUTPATIENT
Start: 2024-07-12 | End: 2024-07-22

## 2024-07-12 RX ORDER — FLUCONAZOLE 150 MG/1
TABLET ORAL
COMMUNITY
Start: 2024-07-12

## 2024-07-12 RX ORDER — NITROFURANTOIN 25; 75 MG/1; MG/1
100 CAPSULE ORAL 2 TIMES DAILY
Qty: 6 CAPSULE | Refills: 0 | Status: SHIPPED | OUTPATIENT
Start: 2024-07-12 | End: 2024-07-15

## 2024-07-12 RX ORDER — ONDANSETRON 4 MG/1
4 TABLET, ORALLY DISINTEGRATING ORAL EVERY 8 HOURS PRN
COMMUNITY
Start: 2024-07-11 | End: 2024-07-16

## 2024-07-12 NOTE — PROGRESS NOTES
Consent: Layla Page, who was seen by synchronous (real-time) audio-video technology, and/or her healthcare decision maker, is aware that this patient-initiated, Telehealth encounter on 7/12/2024 is a billable service, with coverage as determined by her insurance carrier. She is aware that she may receive a bill and has provided verbal consent to proceed: Yes.      Subjective:   Layla Page is a 39 y.o. 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    Presents today with uncontrolled anxiety.  She has seen more unusual medical conditions in the clinic in which she is working and it has made her much more anxious about health.  Her mother has significant severe health-related anxiety.  She reached out to me via Nugg-it on July 1.  She was previously taking Prozac 10 mg but it caused significant GERD and she stopped it in early June of this year.  I sent in Viibryd 5 mg daily and she is taking it for for 4 days now.  Reports mild nausea.   Denies vomiting.  Denies any other positive or negative effects.  She is having difficulty concentrating, feels very worried about things.  Asks for an as needed medication to have while Vibryd is starting to work.  Her mother takes alprazolam as needed.  She is a pharmacist and has heard mixed reviews about hydroxyzine.    Recently has been seen by gynecology for screening exam on June 17.  Pelvic exam was unremarkable, but was found to have ASCUS on Pap.  Was recommended to follow-up in 3 years.  HPV negative.  Patient admits that she is having trouble with this recommend patient and has great concerns that she could have a pelvic cancer.  She is having mild degree of pelvic discomfort which seems to have come on in the past couple weeks, since this exam.  She went to urgent care July 11 for concerns of urinary tract infection.  Associated with nausea.  Onset was 1 week

## 2024-07-12 NOTE — PROGRESS NOTES
Layla Page is a 39 y.o. female presents for a problem visit.    Chief Complaint   Patient presents with    pelvic pressure    Nausea     Patient's last menstrual period was 06/23/2024 (exact date).  Birth Control: vasectomy.  Last Pap: abnormal obtained ASCUS w/ HR HPV negative    The patient is reporting having: pelvic pain and pressure, frequently urinary     Examination chaperoned by Marilu Nguyen MA.

## 2024-07-12 NOTE — PROGRESS NOTES
OB/GYN Problem VIsit    HPI  Layla Page is a ,  39 y.o. female who presents for a problem visit.     Pelvic pressure and dysuria with frequency. Took monistat without improvement. Feels constant pain/pressure, worse with urination. Went to urgent care yesterday and had udip that was negative as well as wet prep that was negative.     Past Medical History:   Diagnosis Date    Abnormal Pap smear of cervix 2024    ASCUS with HR HPV negative - repap 3yr    Acne     Anemia     Contusion of left knee 2013    Eczema 2013    Encounter for supervision of other normal pregnancy, unspecified trimester 3/5/2018    Declined genetic testing GERD zantac Nausea zofran LLP, post, no longer previa <2cm, fu 34 wks (araceli klein) 18 1 hr gtt #136; 3 hr gtt 18 WNL-80, 131, 124, 78 Hgb 10.5 @ 29wks neg k-b 18 MFM appt 18 @ 8:45am for elevated BP/anxiety--pt notified in office.  Induction 10/3/18. Pt notified.    GERD (gastroesophageal reflux disease) 2015    Migraine 2013    no aura, worse on cycle,     Right knee meniscal tear     resolved w physical therapy.  BBall injury    Right wrist fracture , 2000    x2    Scoliosis     mild    Screening for cervical cancer 2010, 3/8/2012; 3/17/21    normal. Dr. Fortune; Neg pap and neg HPV    Trauma      History reviewed. No pertinent surgical history.  Social History     Occupational History    Not on file   Tobacco Use    Smoking status: Never    Smokeless tobacco: Never   Vaping Use    Vaping Use: Never used   Substance and Sexual Activity    Alcohol use: Yes     Alcohol/week: 2.0 standard drinks of alcohol     Types: 2 Glasses of wine per week     Comment: 2-3 drinks total per week usually on weekend    Drug use: No    Sexual activity: Yes     Partners: Male     Birth control/protection: Male Sterilization     Comment:      Family History   Problem Relation Age of Onset    High Cholesterol Father     Hypertension Father

## 2024-07-12 NOTE — PROGRESS NOTES
Layla Page is a 39 y.o. female presenting for Anxiety and Follow-up      LMP 05/27/2024       Current Outpatient Medications   Medication Sig Dispense Refill    nitrofurantoin, macrocrystal-monohydrate, (MACROBID) 100 MG capsule Take 1 capsule by mouth 2 times daily for 3 days 6 capsule 0    fluconazole (DIFLUCAN) 150 MG tablet       ondansetron (ZOFRAN-ODT) 4 MG disintegrating tablet Take 1 tablet by mouth every 8 hours as needed      vilazodone hcl 10 MG TABS Take 1 tablet by mouth daily 30 tablet 0    SUMAtriptan (IMITREX) 50 MG tablet Take 1 tablet by mouth daily as needed for Migraine 9 tablet 5    Trifarotene (AKLIEF) 0.005 % CREA Apply topically      budesonide-formoterol (SYMBICORT) 160-4.5 MCG/ACT AERO Inhale 2 puffs into the lungs 2 times daily (Patient not taking: Reported on 7/12/2024) 10.2 g 5    Multiple Vitamins-Minerals (MULTIVITAMIN GUMMIES WOMENS PO) Take by mouth daily (Patient not taking: Reported on 7/12/2024)      Fexofenadine HCl (ALLEGRA ALLERGY PO) Take by mouth (Patient not taking: Reported on 7/12/2024)      ascorbic acid (VITAMIN C) 500 MG tablet Take by mouth 2 times daily (Patient not taking: Reported on 7/12/2024)      triamcinolone (NASACORT) 55 MCG/ACT nasal inhaler  (Patient not taking: Reported on 7/12/2024)       No current facility-administered medications for this visit.        1. \"Have you been to the ER, urgent care clinic since your last visit?  Hospitalized since your last visit?\" No    2. \"Have you seen or consulted any other health care providers outside of the [facility] since your last visit?\" No     3. For patients aged 45-75: Has the patient had a colonoscopy / FIT/ Cologuard? NA - based on age      If the patient is female:    4. For patients aged 40-74: Has the patient had a mammogram within the past 2 years? NA - based on age or sex      5. For patients aged 21-65: Has the patient had a pap smear? Yes - Care Gap present. Most recent result on

## 2024-07-13 LAB — BACTERIA UR CULT: NO GROWTH

## 2024-07-16 ENCOUNTER — PATIENT MESSAGE (OUTPATIENT)
Age: 39
End: 2024-07-16

## 2024-07-16 DIAGNOSIS — R10.2 PELVIC PRESSURE IN FEMALE: Primary | ICD-10-CM

## 2024-07-16 RX ORDER — DULOXETIN HYDROCHLORIDE 20 MG/1
20 CAPSULE, DELAYED RELEASE ORAL DAILY
Qty: 30 CAPSULE | Refills: 0 | Status: SHIPPED | OUTPATIENT
Start: 2024-07-16

## 2024-07-17 DIAGNOSIS — R10.2 PELVIC PAIN: Primary | ICD-10-CM

## 2024-07-24 DIAGNOSIS — R10.2 PELVIC PRESSURE IN FEMALE: Primary | ICD-10-CM

## 2024-07-24 DIAGNOSIS — M62.89 PELVIC FLOOR DYSFUNCTION IN FEMALE: ICD-10-CM

## 2024-08-05 ENCOUNTER — TELEPHONE (OUTPATIENT)
Age: 39
End: 2024-08-05

## 2024-08-05 NOTE — TELEPHONE ENCOUNTER
PT name and  verified    40 yo last ov 24, next ov/ae 25  Last ae 24      PT calling stating that she went and saw urogyn  and was diagnosed with Pudendal neuralgia and has recently had \"a lot of pelvic exams\".   PT reports she noted a small cyst at the opening of her vagina and does not know if it is a bartholin cyst? She is unsure.  PT does not have any complaints of pain at this time and was seeing if she could be seen this week.    Please advise  Thank you

## 2024-08-05 NOTE — TELEPHONE ENCOUNTER
PT call returned name and  verified      MD message relayed:  Laura Fortune MD   to Me       24  2:55 PM  9:40am   Problem visit    TRP    PT accepts and placed on schedule.

## 2024-08-08 ENCOUNTER — OFFICE VISIT (OUTPATIENT)
Age: 39
End: 2024-08-08

## 2024-08-08 ENCOUNTER — OFFICE VISIT (OUTPATIENT)
Age: 39
End: 2024-08-08
Payer: COMMERCIAL

## 2024-08-08 VITALS
OXYGEN SATURATION: 98 % | WEIGHT: 144 LBS | DIASTOLIC BLOOD PRESSURE: 81 MMHG | BODY MASS INDEX: 22.6 KG/M2 | HEIGHT: 67 IN | TEMPERATURE: 98.9 F | HEART RATE: 67 BPM | SYSTOLIC BLOOD PRESSURE: 135 MMHG

## 2024-08-08 VITALS
SYSTOLIC BLOOD PRESSURE: 147 MMHG | WEIGHT: 144 LBS | HEART RATE: 72 BPM | DIASTOLIC BLOOD PRESSURE: 79 MMHG | BODY MASS INDEX: 22.55 KG/M2

## 2024-08-08 DIAGNOSIS — Z00.00 PREVENTATIVE HEALTH CARE: Primary | ICD-10-CM

## 2024-08-08 DIAGNOSIS — G58.8 PUDENDAL NEURALGIA: ICD-10-CM

## 2024-08-08 DIAGNOSIS — F41.9 ANXIETY: ICD-10-CM

## 2024-08-08 DIAGNOSIS — N90.7 VULVAR CYST: Primary | ICD-10-CM

## 2024-08-08 PROCEDURE — 99212 OFFICE O/P EST SF 10 MIN: CPT | Performed by: OBSTETRICS & GYNECOLOGY

## 2024-08-08 PROCEDURE — 99459 PELVIC EXAMINATION: CPT | Performed by: OBSTETRICS & GYNECOLOGY

## 2024-08-08 RX ORDER — DULOXETIN HYDROCHLORIDE 20 MG/1
20 CAPSULE, DELAYED RELEASE ORAL DAILY
Qty: 30 CAPSULE | Refills: 2 | Status: SHIPPED | OUTPATIENT
Start: 2024-08-08

## 2024-08-08 RX ORDER — GABAPENTIN 300 MG/1
300 CAPSULE ORAL 3 TIMES DAILY
Qty: 90 CAPSULE | Refills: 2 | Status: SHIPPED | OUTPATIENT
Start: 2024-08-08 | End: 2024-11-06

## 2024-08-08 RX ORDER — GABAPENTIN 300 MG/1
300 CAPSULE ORAL 3 TIMES DAILY
COMMUNITY
Start: 2024-07-17 | End: 2024-08-08 | Stop reason: SDUPTHER

## 2024-08-08 ASSESSMENT — PATIENT HEALTH QUESTIONNAIRE - PHQ9
2. FEELING DOWN, DEPRESSED OR HOPELESS: NOT AT ALL
1. LITTLE INTEREST OR PLEASURE IN DOING THINGS: NOT AT ALL
SUM OF ALL RESPONSES TO PHQ QUESTIONS 1-9: 0
SUM OF ALL RESPONSES TO PHQ9 QUESTIONS 1 & 2: 0

## 2024-08-08 NOTE — PROGRESS NOTES
Rooming note, gyn problem visit:    Chief Complaint   Patient presents with    Lump Near Vulva     Layla Page is a 39 y.o. female presents for a problem visit.    Patient's last menstrual period was 2024 (exact date).  Birth Control: vasectomy.    Last or next WWE is: 2024    The patient is reporting havin week ago when wiping she noticed a lump on the left side of her vulva, has not tried any compress or creams, wanted eval. Going to [place] soon. Pt reports it may have gotten slightly smaller. Pt believes it may be a erna cyst.     Has been seeing [facility] for pudendal neuralgia from cycling.     This is a new problem.  She has not experienced this problem before.    She reports the symptoms are is unchanged.  Aggravating factors include none.  And alleviating factors include none.    She does not have other concerns.

## 2024-08-08 NOTE — PROGRESS NOTES
[facility]  [email or web address]  [email or web address]  [phone]    Laura Fortune MD, FACOG      OB/GYN Problem visit    HPI  Layla Page is a , 39 y.o. female who presents for a problem visit.   Chief Complaint   Patient presents with    Lump Near Vulva       Felt sensitivity and lump on left side.  Pelvic neuralgia: PT planned and gabapentin. 80-90% better.  No ho bartholin cyst.  Leaving for [place] tomorrow.   Has had hsv testing due to pelvic neuralgia (Seeing [facility])      Per Rooming Note:  Layla Page is a 39 y.o. female presents for a problem visit.     Patient's last menstrual period was 2024 (exact date).  Birth Control: vasectomy.     Last or next WWE is: 2024     The patient is reporting havin week ago when wiping she noticed a lump on the left side of her vulva, has not tried any compress or creams, wanted eval. Going to [place] soon. Pt reports it may have gotten slightly smaller. Pt believes it may be a erna cyst.      Has been seeing [facility] for pudendal neuralgia from cycling.      This is a new problem.  She has not experienced this problem before.     She reports the symptoms are is unchanged.  Aggravating factors include none.  And alleviating factors include none.     She does not have other concerns.       Sexual history and Contraception:  Social History     Substance and Sexual Activity   Sexual Activity Yes    Partners: Male    Birth control/protection: Male Sterilization    Comment:        Past Medical History:   Diagnosis Date    Abnormal Pap smear of cervix 2024    ASCUS with HR HPV negative - repap 3yr    Acne     Anemia     Contusion of left knee 2013    Eczema 2013    Encounter for supervision of other normal pregnancy, unspecified trimester 3/5/2018    Declined genetic testing GERD zantac Nausea zofran LLP, post, no longer previa <2cm, fu 34 wks (araceli klein)

## 2024-08-08 NOTE — PROGRESS NOTES
Identified pt with two pt identifiers(name and ). Reviewed record in preparation for visit and have obtained necessary documentation. All patient medications has been reviewed.  Chief Complaint   Patient presents with    Annual Exam       Vitals:    24 1438   BP: 135/81   Pulse: 67   Temp: 98.9 °F (37.2 °C)   SpO2: 98%                   Coordination of Care Questionnaire:   1) Have you been to an emergency room, urgent care, or hospitalized since your last visit?  Pt was seen at [facility] on  for vaginal burning.    2. Have seen or consulted any other health care provider since your last visit? no        
monitor for now.  Could increase duloxetine anytime.  -     DULoxetine (CYMBALTA) 20 MG extended release capsule; Take 1 capsule by mouth daily              The patient is asked to make an attempt to improve diet and exercise patterns    Return for yearly wellness visits

## 2024-08-10 PROBLEM — F41.9 ANXIETY: Status: ACTIVE | Noted: 2024-08-10

## 2024-08-31 DIAGNOSIS — G43.009 MIGRAINE WITHOUT AURA AND WITHOUT STATUS MIGRAINOSUS, NOT INTRACTABLE: ICD-10-CM

## 2024-09-03 RX ORDER — SUMATRIPTAN 50 MG/1
50 TABLET, FILM COATED ORAL DAILY PRN
Qty: 9 TABLET | Refills: 5 | Status: SHIPPED | OUTPATIENT
Start: 2024-09-03

## 2024-09-09 DIAGNOSIS — F41.9 ANXIETY: ICD-10-CM

## 2024-09-09 DIAGNOSIS — G58.8 PUDENDAL NEURALGIA: ICD-10-CM

## 2024-09-10 DIAGNOSIS — G43.009 MIGRAINE WITHOUT AURA AND WITHOUT STATUS MIGRAINOSUS, NOT INTRACTABLE: ICD-10-CM

## 2024-09-10 RX ORDER — SUMATRIPTAN 50 MG/1
50 TABLET, FILM COATED ORAL DAILY PRN
Qty: 9 TABLET | Refills: 5 | OUTPATIENT
Start: 2024-09-10

## 2024-09-10 RX ORDER — DULOXETIN HYDROCHLORIDE 20 MG/1
20 CAPSULE, DELAYED RELEASE ORAL DAILY
Qty: 90 CAPSULE | Refills: 2 | Status: SHIPPED | OUTPATIENT
Start: 2024-09-10

## 2024-09-13 NOTE — ANESTHESIA PREPROCEDURE EVALUATION
Anesthetic History No history of anesthetic complications Review of Systems / Medical History Patient summary reviewed, nursing notes reviewed and pertinent labs reviewed Pulmonary Within defined limits Neuro/Psych Within defined limits Cardiovascular Within defined limits GI/Hepatic/Renal 
  
GERD: well controlled Endo/Other Within defined limits Other Findings Physical Exam 
 
Airway Mallampati: III 
TM Distance: 4 - 6 cm Neck ROM: normal range of motion Cardiovascular Rhythm: regular Rate: normal 
 
 
 
 Dental 
No notable dental hx Pulmonary Abdominal 
 
 
 
 Other Findings Anesthetic Plan ASA: 2 Anesthesia type: epidural 
 
 
 
 
 
Anesthetic plan and risks discussed with: Patient and Spouse Risks including headache, back ache, failure to work and need for replacement discussed with pt. Pt chooses to proceed with epidural. Consent signed   
note entered after procedure.
no

## 2024-10-14 RX ORDER — AZITHROMYCIN 250 MG/1
TABLET, FILM COATED ORAL
Qty: 6 TABLET | Refills: 0 | Status: SHIPPED | OUTPATIENT
Start: 2024-10-14 | End: 2024-10-24

## 2024-10-24 ENCOUNTER — OFFICE VISIT (OUTPATIENT)
Age: 39
End: 2024-10-24
Payer: COMMERCIAL

## 2024-10-24 VITALS
SYSTOLIC BLOOD PRESSURE: 117 MMHG | BODY MASS INDEX: 22.43 KG/M2 | WEIGHT: 143.2 LBS | HEART RATE: 69 BPM | DIASTOLIC BLOOD PRESSURE: 71 MMHG

## 2024-10-24 DIAGNOSIS — N64.4 BREAST TENDERNESS: Primary | ICD-10-CM

## 2024-10-24 DIAGNOSIS — N64.4 PAIN OF BOTH BREASTS: ICD-10-CM

## 2024-10-24 PROCEDURE — 99212 OFFICE O/P EST SF 10 MIN: CPT | Performed by: OBSTETRICS & GYNECOLOGY

## 2024-10-24 NOTE — PROGRESS NOTES
[facility]  [email or web address]    Laura Fortune MD, FACOG       OB/GYN Problem visit    Chief Complaint:   Chief Complaint   Patient presents with    Other     Breast problem       History of Present Illness:  This is  a new problem being evaluated by this provider.      The patient is a 39 y.o.  female who reports having  left breast pain  for 1 months.  She reports the symptoms are is unchanged.  Aggravating factors include none.   Alleviating factors include none.    She reports  no mass .    X 1 month: increased tenderness: position varies.  Not exercising regularly.  Due to pudendal neuralgia.  Seeing PT.  Sx started after bronchitis.    Ho exercise induced asthma  Tried medrol dose pack.    FH breast cancer.      Breast Cancer-related family history is not on file.    She is not breast feeding.    Rooming note;  Last Pap: approximate date 2024 and was abnormal: ASCUS/HPV neg  Last or next WWE is: 2024, scheduled for 2025.      This is a new problem being evaluated by this provider.    She has not experienced this problem before.     The patient is a 39 y.o.  female who reports having intermittent breast pain, mostly on left breast but sometimes on right breast, denies lumps. Pt reports her mother had bilateral breast pain in her lower 40's until menopause.   Pt reports cycle coming a few days early this month & last month. Pt has not been exercising x few months d/t pelvic neuralgia.  Pt getting over bronchitis.       She reports the symptoms are is unchanged.  Aggravating factors include none.   Alleviating factors include none.     She reports no previous breast imaging, no family history of breast cancer..  She is not breast feeding.     She does not have other concerns.     Last Mammogram Results:  None, pt is 40 y/o & has never had breast imaging before.     She does not have other concerns.    Last Mammogram Results:  Mammogram Result (most

## 2024-10-24 NOTE — PROGRESS NOTES
Rooming note, Breast problem visit:    Chief Complaint   Patient presents with    Annual Exam     Layla Page is a 39 y.o. female presents for a breast problem visit.    Patient's last menstrual period was 10/11/2024.  Birth Control: vasectomy.    Last Pap: approximate date 2024 and was abnormal: ASCUS/HPV neg  Last or next WWE is: 2024, scheduled for 2025.     This is a new problem being evaluated by this provider.    She has not experienced this problem before.    The patient is a 39 y.o.  female who reports having intermittent breast pain, mostly on left breast but sometimes on right breast, denies lumps. Pt reports her mother had bilateral breast pain in her lower 40's until menopause.   Pt reports cycle coming a few days early this month & last month. Pt has not been exercising x few months d/t pelvic neuralgia.  Pt getting over bronchitis.      She reports the symptoms are is unchanged.  Aggravating factors include none.   Alleviating factors include none.    She reports no previous breast imaging, no family history of breast cancer..  She is not breast feeding.    She does not have other concerns.    Last Mammogram Results:  None, pt is 40 y/o & has never had breast imaging before.

## 2024-11-27 RX ORDER — ESCITALOPRAM OXALATE 5 MG/1
5 TABLET ORAL DAILY
Qty: 30 TABLET | Refills: 0 | Status: SHIPPED | OUTPATIENT
Start: 2024-11-27

## 2024-12-04 NOTE — PROCEDURE: MEDICATION COUNSELING
Quality 47: Advance Care Plan: Advance Care Planning discussed and documented; advance care plan or surrogate decision maker documented in the medical record. Quality 226: Preventive Care And Screening: Tobacco Use: Screening And Cessation Intervention: Patient screened for tobacco use and is an ex/non-smoker Detail Level: Detailed Protopic Counseling: Patient may experience a mild burning sensation during topical application. Protopic is not approved in children less than 2 years of age. There have been case reports of hematologic and skin malignancies in patients using topical calcineurin inhibitors although causality is questionable.

## 2025-01-02 DIAGNOSIS — G43.009 MIGRAINE WITHOUT AURA AND WITHOUT STATUS MIGRAINOSUS, NOT INTRACTABLE: ICD-10-CM

## 2025-01-02 RX ORDER — SUMATRIPTAN 50 MG/1
50 TABLET, FILM COATED ORAL DAILY PRN
Qty: 9 TABLET | Refills: 5 | Status: SHIPPED | OUTPATIENT
Start: 2025-01-02

## 2025-01-02 RX ORDER — ESCITALOPRAM OXALATE 10 MG/1
10 TABLET ORAL DAILY
Qty: 30 TABLET | Refills: 2 | Status: SHIPPED | OUTPATIENT
Start: 2025-01-02

## 2025-01-03 DIAGNOSIS — F41.9 ANXIETY: Primary | ICD-10-CM

## 2025-01-06 RX ORDER — ESCITALOPRAM OXALATE 10 MG/1
10 TABLET ORAL DAILY
Qty: 30 TABLET | Refills: 2 | Status: SHIPPED | OUTPATIENT
Start: 2025-01-06

## 2025-04-03 DIAGNOSIS — F41.9 ANXIETY: ICD-10-CM

## 2025-04-04 RX ORDER — ESCITALOPRAM OXALATE 10 MG/1
10 TABLET ORAL DAILY
Qty: 30 TABLET | Refills: 0 | Status: SHIPPED | OUTPATIENT
Start: 2025-04-04

## 2025-04-12 DIAGNOSIS — F41.9 ANXIETY: ICD-10-CM

## 2025-04-14 RX ORDER — ESCITALOPRAM OXALATE 10 MG/1
10 TABLET ORAL DAILY
Qty: 30 TABLET | Refills: 0 | Status: SHIPPED | OUTPATIENT
Start: 2025-04-14

## 2025-05-01 ENCOUNTER — OFFICE VISIT (OUTPATIENT)
Facility: CLINIC | Age: 40
End: 2025-05-01
Payer: COMMERCIAL

## 2025-05-01 VITALS
TEMPERATURE: 98.2 F | SYSTOLIC BLOOD PRESSURE: 126 MMHG | BODY MASS INDEX: 22.26 KG/M2 | HEART RATE: 59 BPM | WEIGHT: 141.8 LBS | HEIGHT: 67 IN | DIASTOLIC BLOOD PRESSURE: 78 MMHG | OXYGEN SATURATION: 98 %

## 2025-05-01 DIAGNOSIS — R68.89 COLD INTOLERANCE: ICD-10-CM

## 2025-05-01 DIAGNOSIS — Z00.00 PREVENTATIVE HEALTH CARE: ICD-10-CM

## 2025-05-01 DIAGNOSIS — G58.8 PUDENDAL NEURALGIA: ICD-10-CM

## 2025-05-01 DIAGNOSIS — G43.009 MIGRAINE WITHOUT AURA AND WITHOUT STATUS MIGRAINOSUS, NOT INTRACTABLE: ICD-10-CM

## 2025-05-01 DIAGNOSIS — L65.9 HAIR LOSS: ICD-10-CM

## 2025-05-01 DIAGNOSIS — R45.89 ANXIETY ABOUT HEALTH: ICD-10-CM

## 2025-05-01 DIAGNOSIS — Z00.00 PREVENTATIVE HEALTH CARE: Primary | ICD-10-CM

## 2025-05-01 LAB
ALBUMIN SERPL-MCNC: 4.2 G/DL (ref 3.5–5)
ALBUMIN/GLOB SERPL: 1.3 (ref 1.1–2.2)
ALP SERPL-CCNC: 57 U/L (ref 45–117)
ALT SERPL-CCNC: 26 U/L (ref 12–78)
ANION GAP SERPL CALC-SCNC: 7 MMOL/L (ref 2–12)
AST SERPL-CCNC: 24 U/L (ref 15–37)
BILIRUB SERPL-MCNC: 0.4 MG/DL (ref 0.2–1)
BUN SERPL-MCNC: 15 MG/DL (ref 6–20)
BUN/CREAT SERPL: 18 (ref 12–20)
CALCIUM SERPL-MCNC: 9.9 MG/DL (ref 8.5–10.1)
CHLORIDE SERPL-SCNC: 104 MMOL/L (ref 97–108)
CHOLEST SERPL-MCNC: 199 MG/DL
CO2 SERPL-SCNC: 28 MMOL/L (ref 21–32)
CREAT SERPL-MCNC: 0.85 MG/DL (ref 0.55–1.02)
ERYTHROCYTE [DISTWIDTH] IN BLOOD BY AUTOMATED COUNT: 12.2 % (ref 11.5–14.5)
FERRITIN SERPL-MCNC: 13 NG/ML (ref 26–388)
GLOBULIN SER CALC-MCNC: 3.3 G/DL (ref 2–4)
GLUCOSE SERPL-MCNC: 98 MG/DL (ref 65–100)
HCT VFR BLD AUTO: 40.6 % (ref 35–47)
HDLC SERPL-MCNC: 73 MG/DL
HDLC SERPL: 2.7 (ref 0–5)
HGB BLD-MCNC: 13.1 G/DL (ref 11.5–16)
LDLC SERPL CALC-MCNC: 112.4 MG/DL (ref 0–100)
MCH RBC QN AUTO: 30.3 PG (ref 26–34)
MCHC RBC AUTO-ENTMCNC: 32.3 G/DL (ref 30–36.5)
MCV RBC AUTO: 93.8 FL (ref 80–99)
NRBC # BLD: 0 K/UL (ref 0–0.01)
NRBC BLD-RTO: 0 PER 100 WBC
PLATELET # BLD AUTO: 184 K/UL (ref 150–400)
PMV BLD AUTO: 10.9 FL (ref 8.9–12.9)
POTASSIUM SERPL-SCNC: 4.3 MMOL/L (ref 3.5–5.1)
PROT SERPL-MCNC: 7.5 G/DL (ref 6.4–8.2)
RBC # BLD AUTO: 4.33 M/UL (ref 3.8–5.2)
SODIUM SERPL-SCNC: 139 MMOL/L (ref 136–145)
TRIGL SERPL-MCNC: 68 MG/DL
TSH SERPL DL<=0.05 MIU/L-ACNC: 1.1 UIU/ML (ref 0.36–3.74)
VLDLC SERPL CALC-MCNC: 13.6 MG/DL
WBC # BLD AUTO: 3.8 K/UL (ref 3.6–11)

## 2025-05-01 PROCEDURE — 99214 OFFICE O/P EST MOD 30 MIN: CPT | Performed by: INTERNAL MEDICINE

## 2025-05-01 PROCEDURE — 99395 PREV VISIT EST AGE 18-39: CPT | Performed by: INTERNAL MEDICINE

## 2025-05-01 RX ORDER — GABAPENTIN 300 MG/1
300 CAPSULE ORAL 3 TIMES DAILY PRN
Qty: 90 CAPSULE | Refills: 2
Start: 2025-05-01 | End: 2025-07-30

## 2025-05-01 RX ORDER — CLINDAMYCIN PHOSPHATE 10 UG/ML
1 LOTION TOPICAL 2 TIMES DAILY
COMMUNITY
Start: 2025-04-14 | End: 2026-04-14

## 2025-05-01 RX ORDER — ESCITALOPRAM OXALATE 10 MG/1
10 TABLET ORAL DAILY
Qty: 90 TABLET | Refills: 1 | Status: SHIPPED | OUTPATIENT
Start: 2025-05-01

## 2025-05-01 ASSESSMENT — PATIENT HEALTH QUESTIONNAIRE - PHQ9
SUM OF ALL RESPONSES TO PHQ QUESTIONS 1-9: 0
1. LITTLE INTEREST OR PLEASURE IN DOING THINGS: NOT AT ALL
SUM OF ALL RESPONSES TO PHQ QUESTIONS 1-9: 0
2. FEELING DOWN, DEPRESSED OR HOPELESS: NOT AT ALL

## 2025-05-02 NOTE — PROGRESS NOTES
Identified pt with two pt identifiers(name and ). Reviewed record in preparation for visit and have obtained necessary documentation. All patient medications has been reviewed.  Chief Complaint   Patient presents with    Anxiety     *Immunizations updated if available*    Health Maintenance Due   Topic    COVID-19 Vaccine ( season)     Health Maintenance Review: Patient reminded of \"due or due soon\" health maintenance. I have asked the patient to contact his/her primary care provider (PCP) for follow-up on his/her health maintenance.    Wt Readings from Last 3 Encounters:   25 64.3 kg (141 lb 12.8 oz)   10/24/24 65 kg (143 lb 3.2 oz)   24 65.3 kg (144 lb)     Temp Readings from Last 3 Encounters:   25 98.2 °F (36.8 °C)   24 98.9 °F (37.2 °C) (Temporal)   23 98.1 °F (36.7 °C) (Oral)     BP Readings from Last 3 Encounters:   25 126/78   10/24/24 117/71   24 135/81     Pulse Readings from Last 3 Encounters:   25 59   10/24/24 69   24 67       1. \"Have you been to the ER, urgent care clinic since your last visit?  Hospitalized since your last visit?\" No    2. \"Have you seen or consulted any other health care providers outside of the [facility] since your last visit?\"  Derm      3. For patients aged 45-75: Has the patient had a colonoscopy / FIT/ Cologuard? NA - based on age    If the patient is female:    4. For patients aged 40-74: Has the patient had a mammogram within the past 2 years? NA - based on age or sex    5. For patients aged 21-65: Has the patient had a pap smear? Yes - Care Gap present. Most recent result on file    Patient is accompanied by self I have received verbal consent from Layla Page to discuss any/all medical information while they are present in the room.   
Reports hormonal triggers around menstrual cycle.  - Prescription for Imitrex will be continued as needed for migraine relief.        Orders Placed This Encounter   Procedures    CBC    Comprehensive Metabolic Panel    Lipid Panel    TSH    Ferritin    [facility] - Caryl Michelle MD, Ob-Gyn, [place]     lab results and schedule of future lab studies reviewed with patient  reviewed medications and side effects in detail  Return to clinic for further evaluation if new symptoms develop    The patient (or guardian, if applicable) and other individuals in attendance with the patient were advised that Artificial Intelligence will be utilized during this visit to record, process the conversation to generate a clinical note, and support improvement of the AI technology. The patient (or guardian, if applicable) and other individuals in attendance at the appointment consented to the use of AI, including the recording.

## 2025-05-04 ENCOUNTER — RESULTS FOLLOW-UP (OUTPATIENT)
Facility: CLINIC | Age: 40
End: 2025-05-04

## 2025-05-04 DIAGNOSIS — E61.1 IRON DEFICIENCY: Primary | ICD-10-CM
